# Patient Record
Sex: FEMALE | Race: BLACK OR AFRICAN AMERICAN | NOT HISPANIC OR LATINO | Employment: OTHER | URBAN - METROPOLITAN AREA
[De-identification: names, ages, dates, MRNs, and addresses within clinical notes are randomized per-mention and may not be internally consistent; named-entity substitution may affect disease eponyms.]

---

## 2017-01-07 ENCOUNTER — TELEPHONE (OUTPATIENT)
Dept: NURSING | Facility: CLINIC | Age: 27
End: 2017-01-07

## 2017-01-07 NOTE — TELEPHONE ENCOUNTER
"Call Type: Triage Call    Presenting Problem: I had a miscarriage in  October and I think I got  pregnant again the end of November; i have only had one period since  the miscarriage and that was 2 weeks after.  I had a positive  at  home pregnancy test but have not been seen yet.  One hour ago I  started cramping and having  bright red bleeding ; more than  spotting, I soaked my underwear.\"  Advisd to proceed to ED.  Triage Note:  Guideline Title: Pregnancy: Spontaneous Termination, Less Than 20 Weeks  Recommended Disposition: See ED Immediately  Original Inclination:  Override Disposition:  Intended Action:  Physician Contacted: No  Moderate vaginal bleeding ?  YES  Vaginal bleeding AND greater than 20 weeks gestation ? NO  Experiencing contractions AND 20-37 weeks gestation ? NO  Abdominal pain AND greater than 20 weeks gestation ? NO  New or worsening signs and symptoms that may indicate shock ? NO  More than 37 weeks gestation AND contractions ? NO  Recent positive pregnancy test or menses late AND new onset of pain on top or back  of shoulders ? NO  Unbearable abdominal, pelvic or back pain ? NO  Heavy vaginal bleeding (soaking 1 pad every hour for 2 hours or more) ? NO  Continuous bright red vaginal bleeding for more than 15 minutes (more than  spotting) ? NO  Persistent dizziness OR lightheadedness that does not resolve within ten minutes ?  NO  Physician Instructions:  Care Advice: Another adult should drive.  Do not give the patient anything to eat or drink.  Bring any tissue that has passed for examination by provider.  Call  if signs and symptoms of shock develop (such as unable to  stand due to faintness, dizziness, or lightheadedness  new onset of confusion  slow to respond or difficult to awaken  skin is pale, gray, cool, or moist to touch  severe weakness  loss of consciousness).  CALL  if either of these occur: increased bright red vaginal  bleeding or continuous (without relaxation) " abdominal pain.  IMMEDIATE ACTION  Write down provider's name. List or place the following in a bag for  transport with the patient: current prescription and/or nonprescription  medications  alternative treatments, therapies and medications  and street drugs.  Refrain from douching, using feminine hygiene sprays, scented deodorant  tampons, or nonprescription intravaginal medication until evaluated by a  provider.  Tell provider if known to be Rh-negative and have not received Rh o(D)  immune globulin. Rh o(D) immune globulin must be administered within 72  hours of pregnancy termination.

## 2017-01-11 ENCOUNTER — PRENATAL OFFICE VISIT (OUTPATIENT)
Dept: OBGYN | Facility: CLINIC | Age: 27
End: 2017-01-11
Payer: COMMERCIAL

## 2017-01-11 VITALS
RESPIRATION RATE: 16 BRPM | HEART RATE: 104 BPM | DIASTOLIC BLOOD PRESSURE: 93 MMHG | WEIGHT: 131 LBS | SYSTOLIC BLOOD PRESSURE: 150 MMHG | TEMPERATURE: 97.2 F | BODY MASS INDEX: 21.8 KG/M2

## 2017-01-11 DIAGNOSIS — O09.891 SUPERVISION OF OTHER HIGH RISK PREGNANCIES, FIRST TRIMESTER: Primary | ICD-10-CM

## 2017-01-11 DIAGNOSIS — O21.9 NAUSEA/VOMITING IN PREGNANCY: ICD-10-CM

## 2017-01-11 LAB
ANION GAP SERPL CALCULATED.3IONS-SCNC: 10 MMOL/L (ref 3–14)
BUN SERPL-MCNC: 13 MG/DL (ref 7–30)
CALCIUM SERPL-MCNC: 9.3 MG/DL (ref 8.5–10.1)
CHLORIDE SERPL-SCNC: 102 MMOL/L (ref 94–109)
CO2 SERPL-SCNC: 24 MMOL/L (ref 20–32)
CREAT SERPL-MCNC: 0.69 MG/DL (ref 0.52–1.04)
ERYTHROCYTE [DISTWIDTH] IN BLOOD BY AUTOMATED COUNT: 11.7 % (ref 10–15)
GFR SERPL CREATININE-BSD FRML MDRD: ABNORMAL ML/MIN/1.7M2
GLUCOSE SERPL-MCNC: 83 MG/DL (ref 70–99)
HCT VFR BLD AUTO: 36.4 % (ref 35–47)
HGB BLD-MCNC: 12.8 G/DL (ref 11.7–15.7)
MCH RBC QN AUTO: 31 PG (ref 26.5–33)
MCHC RBC AUTO-ENTMCNC: 35.2 G/DL (ref 31.5–36.5)
MCV RBC AUTO: 88 FL (ref 78–100)
PLATELET # BLD AUTO: 249 10E9/L (ref 150–450)
POTASSIUM SERPL-SCNC: 3.3 MMOL/L (ref 3.4–5.3)
RBC # BLD AUTO: 4.13 10E12/L (ref 3.8–5.2)
SODIUM SERPL-SCNC: 136 MMOL/L (ref 133–144)
WBC # BLD AUTO: 12.9 10E9/L (ref 4–11)

## 2017-01-11 PROCEDURE — 86762 RUBELLA ANTIBODY: CPT | Performed by: OBSTETRICS & GYNECOLOGY

## 2017-01-11 PROCEDURE — 36415 COLL VENOUS BLD VENIPUNCTURE: CPT | Performed by: OBSTETRICS & GYNECOLOGY

## 2017-01-11 PROCEDURE — 87389 HIV-1 AG W/HIV-1&-2 AB AG IA: CPT | Performed by: OBSTETRICS & GYNECOLOGY

## 2017-01-11 PROCEDURE — 99207 ZZC FIRST OB VISIT: CPT | Performed by: OBSTETRICS & GYNECOLOGY

## 2017-01-11 PROCEDURE — 87086 URINE CULTURE/COLONY COUNT: CPT | Performed by: OBSTETRICS & GYNECOLOGY

## 2017-01-11 PROCEDURE — 86900 BLOOD TYPING SEROLOGIC ABO: CPT | Performed by: OBSTETRICS & GYNECOLOGY

## 2017-01-11 PROCEDURE — 86780 TREPONEMA PALLIDUM: CPT | Performed by: OBSTETRICS & GYNECOLOGY

## 2017-01-11 PROCEDURE — 86901 BLOOD TYPING SEROLOGIC RH(D): CPT | Performed by: OBSTETRICS & GYNECOLOGY

## 2017-01-11 PROCEDURE — 80048 BASIC METABOLIC PNL TOTAL CA: CPT | Performed by: OBSTETRICS & GYNECOLOGY

## 2017-01-11 PROCEDURE — 87340 HEPATITIS B SURFACE AG IA: CPT | Performed by: OBSTETRICS & GYNECOLOGY

## 2017-01-11 PROCEDURE — 86850 RBC ANTIBODY SCREEN: CPT | Performed by: OBSTETRICS & GYNECOLOGY

## 2017-01-11 PROCEDURE — 85027 COMPLETE CBC AUTOMATED: CPT | Performed by: OBSTETRICS & GYNECOLOGY

## 2017-01-11 RX ORDER — ONDANSETRON 4 MG/1
4-8 TABLET, ORALLY DISINTEGRATING ORAL
Qty: 30 TABLET | Refills: 1 | Status: SHIPPED | OUTPATIENT
Start: 2017-01-11 | End: 2017-01-30

## 2017-01-11 RX ORDER — ONDANSETRON 4 MG/1
8 TABLET, ORALLY DISINTEGRATING ORAL
COMMUNITY
End: 2017-01-11

## 2017-01-11 NOTE — NURSING NOTE
"Chief Complaint   Patient presents with     Prenatal Care       Initial /93 mmHg  Pulse 104  Temp(Src) 97.2  F (36.2  C) (Oral)  Resp 16  Wt 131 lb (59.421 kg)  LMP 11/19/2016  Breastfeeding? No Estimated body mass index is 21.8 kg/(m^2) as calculated from the following:    Height as of 10/14/16: 5' 5\" (1.651 m).    Weight as of this encounter: 131 lb (59.421 kg).  BP completed using cuff size: regular Left arm  Omni Rosado, CMA      "

## 2017-01-11 NOTE — Clinical Note
2017    RE: Shwetha Issa,  1990    To Whom It May Concern:    Shwetha Issa is under our care and was seen at our office on 2017.    Due to her medical condition and complications in her pregnancy she should remain off work indefinitely, effective 2017. Her due date is 2017.     I hope this information is sufficient for your needs.  If you have any additional questions regarding this matter please contact our office.  Thank you.      Sincerely,        Barron Redding MD

## 2017-01-11 NOTE — MR AVS SNAPSHOT
After Visit Summary   1/11/2017    Shwetha Issa    MRN: [id number]           Patient Information     Date Of Birth          1990        Visit Information        Provider Department      1/11/2017 10:30 AM Barron Redding MD [facility]        Care Instructions                                                        If you have any questions regarding your visit, Please contact your care team.     [place] [facility]: [phone]    [facility] CLINIC HOURS TELEPHONE NUMBER   Barron Redding M.D.      Myriam-    Tricia Sinha-RANDOLPH Chen-Medical Assistant   Monday-[place]  8:00a.m-4:45 p.m  Wednesday-[place] 8:00a.m-4:45 p.m.  Thursday-[place]  8:00a.m-4:45 p.m.  Friday-[place]  8:00a.m-4:45 p.m. [facility]  [address] [place] [address] [place] [address]  [phone] ask [facility]  [phone] Fax  Imaging [phone]    [facility] Labor and Delivery  [address] [place] [address] [address].  [place], [place] [address] [phone]    [facility]  [address] [place] [address] [phone] [facility]  [phone] Fax  Imaging [phone]     Urgent Care locations:    [facility] Monday-Friday  5 pm - 9 pm  Saturday and Sunday   9 am - 5 pm    Monday-Friday   11 am - 9 pm  Saturday and Sunday   9 am - 5 pm   [phone]       If you need a medication refill, please contact your pharmacy. Please allow 3 business days for your refill to be completed.  As always, Thank you for trusting us with your healthcare needs!          Follow-ups after your visit        Who to contact     If you have questions or need follow up information about today's clinic visit or your schedule please contact [facility] directly at [phone].  Normal or non-critical lab and imaging results will be communicated to you by MyChart,  letter or phone within 4 business days after the clinic has received the results. If you do not hear from us within 7 days, please contact the clinic through [employer] or phone. If you have a critical or abnormal lab result, we will notify you by phone as soon as possible.  Submit refill requests through [employer] or call your pharmacy and they will forward the refill request to us. Please allow 3 business days for your refill to be completed.          Additional Information About Your Visit        [employer] Information     [employer] gives you secure access to your electronic health record. If you see a primary care provider, you can also send messages to your care team and make appointments. If you have questions, please call your primary care clinic.  If you do not have a primary care provider, please call [phone] and they will assist you.        Care EveryWhere ID     This is your Care EveryWhere ID. This could be used by other organizations to access your [facility] medical records  [id number]        Your Vitals Were     Pulse Temperature Respirations Last Period Breastfeeding?       104 97.2  F (36.2  C) (Oral) 16 11/19/2016 No        Blood Pressure from Last 3 Encounters:   01/11/17 150/93   10/14/16 138/91   09/02/16 122/80    Weight from Last 3 Encounters:   01/11/17 131 lb (59.421 kg)   10/14/16 132 lb 3.2 oz (59.966 kg)   09/02/16 127 lb (57.607 kg)              Today, you had the following     No orders found for display       Primary Care Provider Office Phone # Fax #    Blanche Marcelino PA-C [phone]       [facility] [address] [place]  [facility] [address]        Thank you!     Thank you for choosing [facility]  for your care. Our goal is always to provide you with excellent care. Hearing back from our patients is one way we can continue to improve our services. Please take a few minutes to complete the written survey that you may receive in the mail  after your visit with us. Thank you!             Your Updated Medication List - Protect others around you: Learn how to safely use, store and throw away your medicines at www.disposemymeds.org.          This list is accurate as of: 1/11/17 10:46 AM.  Always use your most recent med list.                   Brand Name Dispense Instructions for use    ondansetron 4 MG ODT tab    ZOFRAN-ODT     Take 8 mg by mouth       prenatal multivitamin  plus iron 27-0.8 MG Tabs per tablet      Take 1 tablet by mouth daily

## 2017-01-11 NOTE — PATIENT INSTRUCTIONS
If you have any questions regarding your visit, Please contact your care team.     [email or web address] [facility]: [phone]    [facility] CLINIC HOURS TELEPHONE NUMBER   PER Villalobos-    Tricia Sinha-RANDOLPH Chen-Medical Assistant   Monday-[place]  8:00a.m-4:45 p.m  Wednesday-[place] 8:00a.m-4:45 p.m.  Thursday-[place]  8:00a.m-4:45 p.m.  Friday-[place]  8:00a.m-4:45 p.m. [facility]  [address]  [place], [place] [address] [phone] ask [facility]  [phone] Fax  Imaging [phone]    [facility] Labor and Delivery  [address] [place] [address] Dr.  [place], [place] [address] [phone]    [facility]  [address] [place] [address]  [phone] ask [facility]  [phone] Fax  Imaging [phone]     Urgent Care locations:    [place]        [place] Monday-Friday  5 pm - 9 pm  Saturday and Sunday   9 am - 5 pm    Monday-Friday   11 am - 9 pm  Saturday and Sunday   9 am - 5 pm   [phone]       If you need a medication refill, please contact your pharmacy. Please allow 3 business days for your refill to be completed.  As always, Thank you for trusting us with your healthcare needs!

## 2017-01-12 PROBLEM — Z34.80 SUPERVISION OF OTHER NORMAL PREGNANCY, ANTEPARTUM: Status: ACTIVE | Noted: 2017-01-12

## 2017-01-12 PROBLEM — O21.9 NAUSEA/VOMITING IN PREGNANCY: Status: ACTIVE | Noted: 2017-01-12

## 2017-01-12 PROBLEM — O30.049 DICHORIONIC DIAMNIOTIC TWIN GESTATION: Status: ACTIVE | Noted: 2017-01-12

## 2017-01-12 PROBLEM — O20.8 SUBCHORIONIC HEMORRHAGE IN FIRST TRIMESTER: Status: ACTIVE | Noted: 2017-01-12

## 2017-01-12 PROBLEM — O20.0 THREATENED SPONTANEOUS ABORTION: Status: ACTIVE | Noted: 2017-01-12

## 2017-01-12 LAB
ABO + RH BLD: NORMAL
ABO + RH BLD: NORMAL
BLD GP AB SCN SERPL QL: NORMAL
BLOOD BANK CMNT PATIENT-IMP: NORMAL
HBV SURFACE AG SERPL QL IA: NONREACTIVE
HIV 1+2 AB+HIV1 P24 AG SERPL QL IA: NORMAL
RUBV IGG SERPL IA-ACNC: 94 IU/ML
SPECIMEN EXP DATE BLD: NORMAL
T PALLIDUM IGG+IGM SER QL: NEGATIVE

## 2017-01-12 NOTE — PROGRESS NOTES
Shwetha Issa is a 26 year old year old G 2 P 0 who presents for an initial obstetrical visit.  Referred by [facility] and self.    Currently experiencing normal pregnancy symptoms without particular problems including pain, bleeding, marked vomiting or weight loss except: mod N/V and ptyalism and 5# wt loss- better with Zofran and Rx refilled, vag bld and [facility] visit x 2 on 1/7 and 1/10- reviewed.   U/S showed living di/di twin pregnancy and 4.5 cm SHIV.  This was a planned pregnancy but not expected so soon after recent SAB 3 mo ago.   Here today alone.   Additional concerns: work restrictions- off for now and see Letter, dates, history of SAB x 1.     ROS:     Systems reviewed include constitutional; breast;                 cardiac; respiratory; gastrointestinal; genitourinary;                                musculoskeletal; integumentary; psychological;                                hematologic/lymphatic and endocrine.                  These systems were negative for significant symptoms except                 for the following: none and see above HPI.    Past medical, obstetrical, surgical, family and social history reviewed and as noted or updated in chart.     Allergies, meds and supplements are as noted or updated in chart.                    Episode OB dating, demographic and history forms completed or reviewed.    EXAM:  VS as noted. BMI- Body mass index is 21.8 kg/(m^2).    Relatively recent normal general exam- not repeated now.       ASSESSMENT: (O09.891) Supervision of other high risk pregnancies, first trimester  (primary encounter diagnosis)  Comment:   Plan: CBC WITH PLATELETS, HIV Antigen Antibody Combo,        Rubella Antibody IgG Quantitative, Hepatitis B         surface antigen, Anti Treponema, ABO/RH TYPE         AND SCREEN, Urine Culture Aerobic Bacterial,         Basic metabolic panel            (O21.9) Nausea/vomiting in pregnancy  Comment:   Plan: ondansetron (ZOFRAN ODT) 4 MG ODT tab, CBC  WITH        PLATELETS, HIV Antigen Antibody Combo, Rubella         Antibody IgG Quantitative, Hepatitis B surface         antigen, Anti Treponema, ABO/RH TYPE AND         SCREEN, Urine Culture Aerobic Bacterial, Basic         metabolic panel               PLAN:  Anticipatory guidance as noted. Symptoms, problems and concerns reviewed. Recommended weight gain discussed. Problem list initiated. Continue pelvic rest. Orders as noted. Pap due in 1.5 yrs. RTC in 4 weeks. Discuss special screening tests next.    Barron Redding MD

## 2017-01-13 ENCOUNTER — TELEPHONE (OUTPATIENT)
Dept: OBGYN | Facility: CLINIC | Age: 27
End: 2017-01-13

## 2017-01-13 DIAGNOSIS — O20.0 THREATENED SPONTANEOUS ABORTION: ICD-10-CM

## 2017-01-13 DIAGNOSIS — O30.041 DICHORIONIC DIAMNIOTIC TWIN PREGNANCY IN FIRST TRIMESTER: ICD-10-CM

## 2017-01-13 DIAGNOSIS — Z34.80 SUPERVISION OF OTHER NORMAL PREGNANCY, ANTEPARTUM: Primary | ICD-10-CM

## 2017-01-13 DIAGNOSIS — O21.9 NAUSEA/VOMITING IN PREGNANCY: ICD-10-CM

## 2017-01-13 DIAGNOSIS — O20.8 SUBCHORIONIC HEMORRHAGE IN FIRST TRIMESTER: ICD-10-CM

## 2017-01-13 LAB
BACTERIA SPEC CULT: NORMAL
MICRO REPORT STATUS: NORMAL
SPECIMEN SOURCE: NORMAL

## 2017-01-13 NOTE — TELEPHONE ENCOUNTER
.Reason for Call:    fyi -bleeding off-on, provided letter for employer    Detailed comments: employer is asking if she can work 4 hours a day vs 8 hours; bled on and off for 2 hours last night, and spotting this morning;  * 8 weeks along;     Phone Number Patient can be reached at: Home number on file [phone] (home)    Best Time: anytime    Can we leave a detailed message on this number? YES    Call taken on 1/13/2017 at 9:23 AM by Christel Rodriguez

## 2017-01-13 NOTE — TELEPHONE ENCOUNTER
I called patient.  She continues to have vaginal spotting and agrees to bed rest.  Patient will notify her place of employment that she is unable to do half days at this time.  She will explain to her work that until her next visit she is to be off completely.  Annette Tavares RN

## 2017-01-22 ENCOUNTER — TELEPHONE (OUTPATIENT)
Dept: NURSING | Facility: CLINIC | Age: 27
End: 2017-01-22

## 2017-01-22 DIAGNOSIS — O21.9 NAUSEA AND VOMITING IN PREGNANCY: Primary | ICD-10-CM

## 2017-01-22 RX ORDER — ONDANSETRON 4 MG/1
4 TABLET, FILM COATED ORAL EVERY 8 HOURS PRN
Qty: 30 TABLET | Refills: 1 | Status: SHIPPED | OUTPATIENT
Start: 2017-01-22 | End: 2017-02-08

## 2017-01-22 NOTE — TELEPHONE ENCOUNTER
Call Type: Triage Call    Presenting Problem: Dr Curt cardenas at 4:51 p.m.  Shwetha Issa,  1990, [id number]  9 weeks preg with twins. Subchorionic  hemorrhage dx'd, Bleeding less and darker color. Concern is she is  having some cramping now. [phone]  Juliet GRACE RN FNA  Triage Note:  Guideline Title: Pregnancy: Spontaneous Termination, Less Than 20 Weeks  ; Pregnancy: Spontaneous Termination, Less Than 20 Weeks  Recommended Disposition: See Provider within 4 hours  Original Inclination: Wanted to speak with a nurse  Override Disposition: Call Provider Immediately  Intended Action: Follow advice given  Physician Contacted: No  Continuous mild vaginal bleeding ?  YES  Passing tissue from the vagina ? NO  Moderate vaginal bleeding ? NO  Vaginal bleeding AND greater than 20 weeks gestation ? NO  Experiencing contractions AND 20-37 weeks gestation ? NO  Abdominal pain AND greater than 20 weeks gestation ? NO  New or worsening signs and symptoms that may indicate shock ? NO  More than 37 weeks gestation AND contractions ? NO  Prior ectopic pregnancy AND abdominal cramping or continuous mild vaginal bleeding  ? NO  IUD in place AND vaginal bleeding or abdominal cramping ? NO  Recent positive pregnancy test or menses late AND new onset of pain on top or back  of shoulders ? NO  Unbearable abdominal, pelvic or back pain ? NO  Lower abdominal, pelvic or back pain with any bleeding ? NO  Cerclage (cervix sutured closed) in place AND any vaginal bleeding or fluid ? NO  Heavy vaginal bleeding (soaking 1 pad every hour for 2 hours or more) ? NO  Continuous bright red vaginal bleeding for more than 15 minutes (more than  spotting) ? NO  Persistent dizziness OR lightheadedness that does not resolve within ten minutes ?  NO  Physician Instructions:  Care Advice: IMMEDIATE ACTION

## 2017-01-22 NOTE — TELEPHONE ENCOUNTER
Call Type: Triage Call    Presenting Problem: Having issues with nausea/ vomiting  due to  being  Pregnant with Twin - 9 weeks and BRE 8/26/17- Dx on 1/13/17  with Subchorionic hemorrhage in first trimester  and  Dr Barron Redding  following for OB . @ 220pm  Smart Web  Paged DR Dana Kaur  to  call Patient back at   [phone] to discuss.  Triage Note:  Guideline Title: No Guideline - Advice Per Reference (Adult)  Recommended Disposition: Call Provider Immediately  Original Inclination: Did not know what to do  Override Disposition:  Intended Action: Call PCP/HCP  Physician Contacted: No  CALL PROVIDER IMMEDIATELY ?  YES  SEE ED IMMEDIATELY ? NO  ACTIVATE  ? NO  Physician Instructions:  Care Advice:

## 2017-01-30 ENCOUNTER — TELEPHONE (OUTPATIENT)
Dept: OBGYN | Facility: CLINIC | Age: 27
End: 2017-01-30

## 2017-01-30 DIAGNOSIS — O21.9 NAUSEA/VOMITING IN PREGNANCY: Primary | ICD-10-CM

## 2017-01-30 RX ORDER — ONDANSETRON 4 MG/1
4-8 TABLET, ORALLY DISINTEGRATING ORAL
Qty: 30 TABLET | Refills: 1 | Status: SHIPPED | OUTPATIENT
Start: 2017-01-30 | End: 2017-02-08

## 2017-01-30 NOTE — TELEPHONE ENCOUNTER
Reason for Call:  Other prescription    Detailed comments: pt calling states that last time was given Zofran tablet to swallow and states that she vomits still with that kind. Pt states would like the Zofran that dissolves under the tongue as that one works better for pt. Pt states also has left side pain and wondering if that is normal or if she should worry about it. Please advise and contact pt in regards. Fills Zofran Prescription at [facility].    Phone Number Patient can be reached at: Home number on file [phone] (home)    Best Time: ANY    Can we leave a detailed message on this number? YES    Call taken on 1/30/2017 at 2:17 PM by Sharon Stanley

## 2017-01-30 NOTE — TELEPHONE ENCOUNTER
Please review for dissolving Zofran orders.  I pended the pharmacy. Patient called this afternoon reporting lower abdominal pain in early pregnancy.  Patient stated she has been having darkish brown vaginal discharge this entire pregnancy.  She is aware she has a subchorionic hemorrhage based on the previous ultrasound at the hospital.  Today she stated she has pain to her lower abdomen that takes her breath away.  The pain has been ongoing for the past 4 hours.  It will come and go and will send tingling and pain into her legs. She is 10 week pregnant with twins.  I advised patient be evaluated in the ER.  Patient agrees to go.  Annette Tavares RN

## 2017-02-08 ENCOUNTER — MEDICAL CORRESPONDENCE (OUTPATIENT)
Dept: HEALTH INFORMATION MANAGEMENT | Facility: CLINIC | Age: 27
End: 2017-02-08

## 2017-02-08 ENCOUNTER — PRENATAL OFFICE VISIT (OUTPATIENT)
Dept: OBGYN | Facility: CLINIC | Age: 27
End: 2017-02-08
Payer: COMMERCIAL

## 2017-02-08 ENCOUNTER — TELEPHONE (OUTPATIENT)
Dept: OBGYN | Facility: CLINIC | Age: 27
End: 2017-02-08

## 2017-02-08 VITALS
BODY MASS INDEX: 21.47 KG/M2 | HEART RATE: 93 BPM | RESPIRATION RATE: 16 BRPM | SYSTOLIC BLOOD PRESSURE: 131 MMHG | TEMPERATURE: 98.2 F | WEIGHT: 129 LBS | DIASTOLIC BLOOD PRESSURE: 80 MMHG

## 2017-02-08 DIAGNOSIS — O30.041 DICHORIONIC DIAMNIOTIC TWIN PREGNANCY IN FIRST TRIMESTER: ICD-10-CM

## 2017-02-08 DIAGNOSIS — Z34.80 SUPERVISION OF OTHER NORMAL PREGNANCY, ANTEPARTUM: Primary | ICD-10-CM

## 2017-02-08 DIAGNOSIS — O21.9 NAUSEA/VOMITING IN PREGNANCY: ICD-10-CM

## 2017-02-08 DIAGNOSIS — O20.8 SUBCHORIONIC HEMORRHAGE IN FIRST TRIMESTER: ICD-10-CM

## 2017-02-08 DIAGNOSIS — O20.0 THREATENED SPONTANEOUS ABORTION: ICD-10-CM

## 2017-02-08 PROCEDURE — 99207 ZZC PRENATAL VISIT: CPT | Performed by: OBSTETRICS & GYNECOLOGY

## 2017-02-08 RX ORDER — ONDANSETRON 4 MG/1
4-8 TABLET, ORALLY DISINTEGRATING ORAL
Qty: 60 TABLET | Refills: 2 | Status: SHIPPED | OUTPATIENT
Start: 2017-02-08 | End: 2017-05-03

## 2017-02-08 NOTE — NURSING NOTE
"Chief Complaint   Patient presents with     Prenatal Care     OBV 11w4d       Initial /80 mmHg  Pulse 93  Temp(Src) 98.2  F (36.8  C) (Oral)  Resp 16  Wt 129 lb (58.514 kg)  LMP 11/19/2016  Breastfeeding? No Estimated body mass index is 21.47 kg/(m^2) as calculated from the following:    Height as of 10/14/16: 5' 5\" (1.651 m).    Weight as of this encounter: 129 lb (58.514 kg).  Medication Reconciliation: complete   Claritzai BRENT Rosado      "

## 2017-02-08 NOTE — Clinical Note
Please arrange elective first trimester screen with MFM at  site if able. Di/di twins. I will sign referral later prn.

## 2017-02-08 NOTE — PATIENT INSTRUCTIONS
If you have any questions regarding your visit, Please contact your care team.     [email or web address] Access Services: [phone]    [facility] CLINIC HOURS TELEPHONE NUMBER   PER Villalobos-    Tricia Sinha-RANDOLPH Chen-Medical Assistant   Monday-[place]  8:00a.m-4:45 p.m  Wednesday-[place] 8:00a.m-4:45 p.m.  Thursday-[place]  8:00a.m-4:45 p.m.  Friday-[place]  8:00a.m-4:45 p.m. [facility]  [address]  [place], [place] [address] [phone] ask [facility]  [phone] Fax  Imaging [phone]    [facility] Labor and Delivery  [address] [place] [address] Dr.  [place], [place] [address] [phone]    [facility]  [address] [place] [address]  [phone] ask [facility]  [phone] Fax  Imaging [phone]     Urgent Care locations:    [place]        [place] Monday-Friday  5 pm - 9 pm  Saturday and Sunday   9 am - 5 pm    Monday-Friday   11 am - 9 pm  Saturday and Sunday   9 am - 5 pm   [phone]       If you need a medication refill, please contact your pharmacy. Please allow 3 business days for your refill to be completed.  As always, Thank you for trusting us with your healthcare needs!

## 2017-02-08 NOTE — PROGRESS NOTES
Doing ok. No signif signs, symptoms or concerns except had another [facility] ED visit for bld and was stable. Wt loss noted, nausea continues but vomiting controlled with Zofran ODT- Rx refilled. May add Vit B6 and Unisom. May RTW light duty and part time. Continue pelvic rest. Here with cousin.   Discussed special diagnostic and screening tests and plans (y = yes, n = no/declined): quad scr = n, survey u/s = n, Level 2 survey u/s with MFM = y, CF carrier scr = n, hemoglobinopathy or thalassemia scr= n, SMA, fragile X  and other genetic scr= n, 1st trimester scr with NT and later AFP or with cell free fetal DNA and later AFP = y, genetic amnio/CVS = n.  Advice as per Checklist update. Discussed condition, tests and care plan including RBA. Problem list updated.   Barron Redding MD

## 2017-02-08 NOTE — MR AVS SNAPSHOT
After Visit Summary   2017    Shwetha Issa    MRN: [id number]           Patient Information     Date Of Birth          1990        Visit Information        Provider Department      2017 12:15 PM Barron Redding MD [facility]        Today's Diagnoses     Supervision of other normal pregnancy, antepartum    -  1     Dichorionic diamniotic twin pregnancy in first trimester         Subchorionic hemorrhage in first trimester         Nausea/vomiting in pregnancy         Threatened spontaneous            Care Instructions                                                        If you have any questions regarding your visit, Please contact your care team.     [employer] [facility]: [phone]    [facility] CLINIC HOURS TELEPHONE NUMBER   Barron Redding M.D.      Myriam-    Tricia Sinha-RANDOLPH Chen-Medical Assistant   Monday-[place]  8:00a.m-4:45 p.m  Wednesday-[place] 8:00a.m-4:45 p.m.  Thursday-[place]  8:00a.m-4:45 p.m.  Friday-[place]  8:00a.m-4:45 p.m. [facility]  [address] [place] [address]  [phone] ask [facility]  [phone] Fax  Imaging [phone]    [facility] Labor and Delivery  [address] [place] [address] [place] [address]  [phone]    [facility]  [address] [facility] [place] [address]  [phone] [facility]  [phone] Fax  Imaging [phone]     Urgent Care locations:    [facility] Monday-Friday  5 pm - 9 pm  Saturday and    9 am - 5 pm    Monday-Friday   11 am - 9 pm  Saturday and    9 am - 5 pm   [phone]       If you need a medication refill, please contact your pharmacy. Please allow 3 business days for your refill to be completed.  As always, Thank you for trusting us with your healthcare needs!          Follow-ups after your visit         Who to contact     If you have questions or need follow up information about today's clinic visit or your schedule please contact [facility] directly at [phone].  Normal or non-critical lab and imaging results will be communicated to you by MyChart, letter or phone within 4 business days after the clinic has received the results. If you do not hear from us within 7 days, please contact the clinic through MyChart or phone. If you have a critical or abnormal lab result, we will notify you by phone as soon as possible.  Submit refill requests through VoltDB or call your pharmacy and they will forward the refill request to us. Please allow 3 business days for your refill to be completed.          Additional Information About Your Visit        [facility] Information     VoltDB gives you secure access to your electronic health record. If you see a primary care provider, you can also send messages to your care team and make appointments. If you have questions, please call your primary care clinic.  If you do not have a primary care provider, please call [phone] and they will assist you.        Care EveryWhere ID     This is your Care EveryWhere ID. This could be used by other organizations to access your [place] medical records  [id number]        Your Vitals Were     Pulse Temperature Respirations Last Period Breastfeeding?       93 98.2  F (36.8  C) (Oral) 16 11/19/2016 No        Blood Pressure from Last 3 Encounters:   02/08/17 131/80   01/11/17 150/93   10/14/16 138/91    Weight from Last 3 Encounters:   02/08/17 129 lb (58.514 kg)   01/11/17 131 lb (59.421 kg)   10/14/16 132 lb 3.2 oz (59.966 kg)              Today, you had the following     No orders found for display       Primary Care Provider Office Phone # Fax #    Blanhce Marcelino PA-C [phone]       [facility] [address]  [facility] [address]        Thank you!     Thank you for choosing  [facility]  for your care. Our goal is always to provide you with excellent care. Hearing back from our patients is one way we can continue to improve our services. Please take a few minutes to complete the written survey that you may receive in the mail after your visit with us. Thank you!             Your Updated Medication List - Protect others around you: Learn how to safely use, store and throw away your medicines at www.disposemymeds.org.          This list is accurate as of: 2/8/17 12:23 PM.  Always use your most recent med list.                   Brand Name Dispense Instructions for use    ondansetron 4 MG ODT tab    ZOFRAN ODT    30 tablet    Take 1-2 tablets (4-8 mg) by mouth 3 times daily (before meals)       ondansetron 4 MG tablet    ZOFRAN    30 tablet    Take 1 tablet (4 mg) by mouth every 8 hours as needed for nausea       prenatal multivitamin  plus iron 27-0.8 MG Tabs per tablet      Take 1 tablet by mouth daily

## 2017-02-14 ENCOUNTER — TRANSFERRED RECORDS (OUTPATIENT)
Dept: HEALTH INFORMATION MANAGEMENT | Facility: CLINIC | Age: 27
End: 2017-02-14

## 2017-02-15 ENCOUNTER — TELEPHONE (OUTPATIENT)
Dept: OBGYN | Facility: CLINIC | Age: 27
End: 2017-02-15

## 2017-02-15 NOTE — TELEPHONE ENCOUNTER
Patient was given letter on 1/11/17 by Dr. Redding to be off work indefinitely during this pregnancy.  Will forward to provider to advise.  Last seen by Dr. Redding for OBV on 2/8/17.

## 2017-02-15 NOTE — TELEPHONE ENCOUNTER
Reason for Call: Request for a LETTER    Order or referral being requested: Needs letter for her employer explaining limitations and restricitons when she returns to work. Put it to attention, To who it may concern. Patient will pick it up at clinic.    Date needed: as soon as possible    Has the patient been seen by the PCP for this problem? YES    Additional comments:     Phone number Patient can be reached at:  Home number on file [phone] (home)    Best Time:  any    Can we leave a detailed message on this number?  YES    Call taken on 2/15/2017 at 10:35 AM by Angela Stein

## 2017-02-16 ENCOUNTER — MYC MEDICAL ADVICE (OUTPATIENT)
Dept: OBGYN | Facility: CLINIC | Age: 27
End: 2017-02-16

## 2017-02-16 NOTE — LETTER
2017    RE: Shwetha Issa,  1990    To Whom It May Concern:    Shwetha Issa is under our care and was seen at our office on 2017.    Due to her medical condition and complications in her higher risk twin pregnancy she may return to work now but should limit her hours to 5 hours/day, 20 hours/week. She should avoid heavy lifting over 20 pounds, prolonged standing in one position for over an hour, excessive bending, straining, or repetitive reaching overhead. She should take all of her designated rest and meal breaks. Her due date is 2017.     I hope this information is sufficient for your needs.  If you have any additional questions regarding this matter please contact our office.  Thank you.      Sincerely,        Barron Redding MD

## 2017-02-16 NOTE — TELEPHONE ENCOUNTER
Pt was off work due to recurrent vaginal bleeding in first trimester with history of past SAB, nausea and vomiting, and twin pregnancy. At her last visit we discussed return to work light duty and part time if improving. Please print attached letter on letterhead and send.   Barron Redding MD

## 2017-03-06 ENCOUNTER — PRENATAL OFFICE VISIT (OUTPATIENT)
Dept: OBGYN | Facility: CLINIC | Age: 27
End: 2017-03-06
Payer: COMMERCIAL

## 2017-03-06 VITALS
WEIGHT: 137 LBS | DIASTOLIC BLOOD PRESSURE: 78 MMHG | HEART RATE: 117 BPM | TEMPERATURE: 97.8 F | SYSTOLIC BLOOD PRESSURE: 128 MMHG | BODY MASS INDEX: 22.8 KG/M2

## 2017-03-06 DIAGNOSIS — O30.042 DICHORIONIC DIAMNIOTIC TWIN PREGNANCY IN SECOND TRIMESTER: ICD-10-CM

## 2017-03-06 DIAGNOSIS — O20.8 SUBCHORIONIC HEMORRHAGE IN FIRST TRIMESTER: ICD-10-CM

## 2017-03-06 DIAGNOSIS — O21.9 NAUSEA/VOMITING IN PREGNANCY: ICD-10-CM

## 2017-03-06 DIAGNOSIS — Z34.80 SUPERVISION OF OTHER NORMAL PREGNANCY, ANTEPARTUM: ICD-10-CM

## 2017-03-06 DIAGNOSIS — O20.0 THREATENED SPONTANEOUS ABORTION: ICD-10-CM

## 2017-03-06 PROCEDURE — 99207 ZZC PRENATAL VISIT: CPT | Performed by: OBSTETRICS & GYNECOLOGY

## 2017-03-06 NOTE — MR AVS SNAPSHOT
After Visit Summary   3/6/2017    Shwetha Issa    MRN: [id number]           Patient Information     Date Of Birth          1990        Visit Information        Provider Department      3/6/2017 9:45 AM Barron Redding MD [facility]        Today's Diagnoses     Supervision of other normal pregnancy, antepartum        Dichorionic diamniotic twin pregnancy in second trimester        Subchorionic hemorrhage in first trimester        Nausea/vomiting in pregnancy        Threatened spontaneous           Care Instructions                                                        If you have any questions regarding your visit, Please contact your care team.     [employer] [facility]: [phone]    [facility] CLINIC HOURS TELEPHONE NUMBER   Barron Redding M.D.      Myriam-    Tricia Sinha-RANDOLPH Chen-Medical Assistant   Monday-[place]  8:00a.m-4:45 p.m  Wednesday-[place] 8:00a.m-4:45 p.m.  Thursday-[place]  8:00a.m-4:45 p.m.  Friday-[place]  8:00a.m-4:45 p.m. [facility]  [address] [place] [address]  [phone] ask [facility]  [phone] Fax  Imaging [phone]    [facility] Labor and Delivery  [address] [place] [address] [place] [address]  [phone]    [facility]  [address] [facility] [place] [address]  [phone] [facility]  [phone] Fax  Imaging [phone]     Urgent Care locations:    [facility] Monday-Friday  5 pm - 9 pm  Saturday and    9 am - 5 pm    Monday-Friday   11 am - 9 pm  Saturday and    9 am - 5 pm   [phone]       If you need a medication refill, please contact your pharmacy. Please allow 3 business days for your refill to be completed.  As always, Thank you for trusting us with your healthcare needs!          Follow-ups after your visit        Who to  contact     If you have questions or need follow up information about today's clinic visit or your schedule please contact [facility] directly at [phone].  Normal or non-critical lab and imaging results will be communicated to you by MyChart, letter or phone within 4 business days after the clinic has received the results. If you do not hear from us within 7 days, please contact the clinic through MOBEXOhart or phone. If you have a critical or abnormal lab result, we will notify you by phone as soon as possible.  Submit refill requests through YouStream Sport Highlights or call your pharmacy and they will forward the refill request to us. Please allow 3 business days for your refill to be completed.          Additional Information About Your Visit        MOBEXOharAFS Technologies Information     [employer] gives you secure access to your electronic health record. If you see a primary care provider, you can also send messages to your care team and make appointments. If you have questions, please call your primary care clinic.  If you do not have a primary care provider, please call [phone] and they will assist you.        Care EveryWhere ID     This is your Care EveryWhere ID. This could be used by other organizations to access your [place] medical records  [id number]        Your Vitals Were     Pulse Temperature Last Period Breastfeeding? BMI (Body Mass Index)       117 97.8  F (36.6  C) (Oral) 11/19/2016 No 22.8 kg/m2        Blood Pressure from Last 3 Encounters:   03/06/17 128/78   02/08/17 131/80   01/11/17 (!) 150/93    Weight from Last 3 Encounters:   03/06/17 62.1 kg (137 lb)   02/08/17 58.5 kg (129 lb)   01/11/17 59.4 kg (131 lb)              Today, you had the following     No orders found for display       Primary Care Provider Office Phone # Fax #    Blanche Marcelino PA-C [phone]       [facility] [address]  [facility] [address]        Thank you!     Thank you for choosing  [facility]  for your care. Our goal is always to provide you with excellent care. Hearing back from our patients is one way we can continue to improve our services. Please take a few minutes to complete the written survey that you may receive in the mail after your visit with us. Thank you!             Your Updated Medication List - Protect others around you: Learn how to safely use, store and throw away your medicines at www.disposemymeds.org.          This list is accurate as of: 3/6/17  9:53 AM.  Always use your most recent med list.                   Brand Name Dispense Instructions for use    ondansetron 4 MG ODT tab    ZOFRAN ODT    60 tablet    Take 1-2 tablets (4-8 mg) by mouth 3 times daily (before meals)       prenatal multivitamin  plus iron 27-0.8 MG Tabs per tablet      Take 1 tablet by mouth daily       UNISOM PO          VITAMIN B6 PO

## 2017-03-06 NOTE — Clinical Note
Please arrange Level 2 u/s with MFM at  site for di/di twins. They saw pt for 1st trim scr. I will sign referral later prn.

## 2017-03-06 NOTE — PATIENT INSTRUCTIONS
If you have any questions regarding your visit, Please contact your care team.     [employer] [facility]: [phone]    [facility] CLINIC HOURS TELEPHONE NUMBER   PER Villalobos-    Tricia Sinha-RANDOLPH Chen-Medical Assistant   Monday-[place]  8:00a.m-4:45 p.m  Wednesday-[place] 8:00a.m-4:45 p.m.  Thursday-[place]  8:00a.m-4:45 p.m.  Friday-[place]  8:00a.m-4:45 p.m. [facility]  [address]  [place], [place] [address] [phone] ask [facility]  [phone] Fax  Imaging [phone]    [facility] Labor and Delivery  [address] [place] [address] Dr.  [place], [place] [address] [phone]    [facility]  [address] [place] [address]  [phone] ask [facility]  [phone] Fax  Imaging [phone]     Urgent Care locations:    [place]        [place] Monday-Friday  5 pm - 9 pm  Saturday and Sunday   9 am - 5 pm    Monday-Friday   11 am - 9 pm  Saturday and Sunday   9 am - 5 pm   [phone]       If you need a medication refill, please contact your pharmacy. Please allow 3 business days for your refill to be completed.  As always, Thank you for trusting us with your healthcare needs!

## 2017-03-06 NOTE — NURSING NOTE
"Chief Complaint   Patient presents with     Prenatal Care     OBV 15w2d       Initial /78 (BP Location: Right arm, Patient Position: Chair, Cuff Size: Adult Regular)  Pulse 117  Temp 97.8  F (36.6  C) (Oral)  Wt 62.1 kg (137 lb)  LMP 11/19/2016  Breastfeeding? No  BMI 22.8 kg/m2 Estimated body mass index is 22.8 kg/(m^2) as calculated from the following:    Height as of 10/14/16: 1.651 m (5' 5\").    Weight as of this encounter: 62.1 kg (137 lb).  Medication Reconciliation: complete   Gustavo Rosado CMA      "

## 2017-03-07 ENCOUNTER — TELEPHONE (OUTPATIENT)
Dept: OBGYN | Facility: CLINIC | Age: 27
End: 2017-03-07

## 2017-03-07 ENCOUNTER — MEDICAL CORRESPONDENCE (OUTPATIENT)
Dept: HEALTH INFORMATION MANAGEMENT | Facility: CLINIC | Age: 27
End: 2017-03-07

## 2017-03-07 NOTE — PROGRESS NOTES
Doing well. No signif signs, symptoms or concerns except some N/V still but better and light old vag bld. Considering AFP for next. Arrange Level 2 u/s. Advice as per Checklist update. Discussed condition, tests and care plan including RBA. Problem list updated.   Barron Redding MD

## 2017-03-07 NOTE — TELEPHONE ENCOUNTER
Referral signed & faxed to MFM along with patient's prenatal chart.  MFM will contact patient to schedule within 3-5 business days.

## 2017-04-03 ENCOUNTER — PRENATAL OFFICE VISIT (OUTPATIENT)
Dept: OBGYN | Facility: CLINIC | Age: 27
End: 2017-04-03
Payer: COMMERCIAL

## 2017-04-03 VITALS
WEIGHT: 150 LBS | DIASTOLIC BLOOD PRESSURE: 71 MMHG | TEMPERATURE: 97.1 F | HEART RATE: 104 BPM | SYSTOLIC BLOOD PRESSURE: 119 MMHG | BODY MASS INDEX: 24.96 KG/M2

## 2017-04-03 DIAGNOSIS — O21.9 NAUSEA/VOMITING IN PREGNANCY: ICD-10-CM

## 2017-04-03 DIAGNOSIS — O20.8 SUBCHORIONIC HEMORRHAGE IN FIRST TRIMESTER: ICD-10-CM

## 2017-04-03 DIAGNOSIS — Z34.80 SUPERVISION OF OTHER NORMAL PREGNANCY, ANTEPARTUM: ICD-10-CM

## 2017-04-03 DIAGNOSIS — O20.0 THREATENED SPONTANEOUS ABORTION: ICD-10-CM

## 2017-04-03 DIAGNOSIS — O30.042 DICHORIONIC DIAMNIOTIC TWIN PREGNANCY IN SECOND TRIMESTER: ICD-10-CM

## 2017-04-03 PROCEDURE — 99207 ZZC PRENATAL VISIT: CPT | Performed by: OBSTETRICS & GYNECOLOGY

## 2017-04-03 NOTE — NURSING NOTE
"Chief Complaint   Patient presents with     Prenatal Care     OBV 19w2d       Initial /71 (BP Location: Right arm, Patient Position: Chair, Cuff Size: Adult Regular)  Pulse 104  Temp 97.1  F (36.2  C) (Oral)  Wt 68 kg (150 lb)  LMP 11/19/2016  Breastfeeding? No  BMI 24.96 kg/m2 Estimated body mass index is 24.96 kg/(m^2) as calculated from the following:    Height as of 10/14/16: 1.651 m (5' 5\").    Weight as of this encounter: 68 kg (150 lb).  Medication Reconciliation: complete   Gustavo Rosado CMA      "

## 2017-04-03 NOTE — LETTER
2017    RE: Shwetha Issa,  1990    To Whom It May Concern:    Shwetha Issa is under our care and was seen at our office on April 3, 2017.    Due to her medical condition and her higher risk twin pregnancy she may continue to work with limitations. Given her recent improvement she may increase her work hours to some extent. She should limit her hours to a maximum of 8 hours/day and 30 hours/week. She should avoid heavy lifting over 20 pounds, prolonged standing in one position for over an hour, excessive bending, straining, or repetitive reaching overhead. She should take all of her designated rest and meal breaks. Her due date is 2017.     I hope this information is sufficient for your needs.  If you have any additional questions regarding this matter please contact our office.  Thank you.      Sincerely,        Barron Redding MD

## 2017-04-03 NOTE — MR AVS SNAPSHOT
After Visit Summary   4/3/2017    Shwetha Issa    MRN: [id number]           Patient Information     Date Of Birth          1990        Visit Information        Provider Department      4/3/2017 10:15 AM Barron Redding MD [facility]        Today's Diagnoses     Supervision of other normal pregnancy, antepartum        Dichorionic diamniotic twin pregnancy in second trimester        Subchorionic hemorrhage in first trimester        Nausea/vomiting in pregnancy        Threatened spontaneous           Care Instructions                                                        If you have any questions regarding your visit, Please contact your care team.     [employer] [facility]: [phone]    [facility] CLINIC HOURS TELEPHONE NUMBER   Barron Redding M.D.      Myriam-    Tricia Sinha-RANDOLPH Chen-Medical Assistant   Monday-[place]  8:00a.m-4:45 p.m  Wednesday-[place] 8:00a.m-4:45 p.m.  Thursday-[place]  8:00a.m-4:45 p.m.  Friday-[place]  8:00a.m-4:45 p.m. [facility]  [address] [place] [address]  [phone] ask [facility]  [phone] Fax  Imaging [phone]    [facility] Labor and Delivery  [address] [place] [address] [place] [address]  [phone]    [facility]  [address] [place] [address]  [phone] [facility]  [phone] Fax  Imaging [phone]     Urgent Care locations:    [facility] Monday-Friday  5 pm - 9 pm  Saturday and    9 am - 5 pm    Monday-Friday   11 am - 9 pm  Saturday and    9 am - 5 pm   [phone]       If you need a medication refill, please contact your pharmacy. Please allow 3 business days for your refill to be completed.  As always, Thank you for trusting us with your healthcare needs!          Follow-ups after your visit        Who to  contact     If you have questions or need follow up information about today's clinic visit or your schedule please contact [facility] directly at [phone].  Normal or non-critical lab and imaging results will be communicated to you by MyChart, letter or phone within 4 business days after the clinic has received the results. If you do not hear from us within 7 days, please contact the clinic through MyChart or phone. If you have a critical or abnormal lab result, we will notify you by phone as soon as possible.  Submit refill requests through ClearViewâ„¢ Audio or call your pharmacy and they will forward the refill request to us. Please allow 3 business days for your refill to be completed.          Additional Information About Your Visit        CoupOption[employer]Nanjing Shouwangxing IT Information     ClearViewâ„¢ Audio gives you secure access to your electronic health record. If you see a primary care provider, you can also send messages to your care team and make appointments. If you have questions, please call your primary care clinic.  If you do not have a primary care provider, please call [phone] and they will assist you.        Care EveryWhere ID     This is your Care EveryWhere ID. This could be used by other organizations to access your [place] medical records  [id number]        Your Vitals Were     Pulse Temperature Last Period Breastfeeding? BMI (Body Mass Index)       104 97.1  F (36.2  C) (Oral) 11/19/2016 No 24.96 kg/m2        Blood Pressure from Last 3 Encounters:   04/03/17 119/71   03/06/17 128/78   02/08/17 131/80    Weight from Last 3 Encounters:   04/03/17 68 kg (150 lb)   03/06/17 62.1 kg (137 lb)   02/08/17 58.5 kg (129 lb)              Today, you had the following     No orders found for display       Primary Care Provider Office Phone # Fax #    Blanche Marcelino PA-C [phone]       [facility] [address]  [facility] [address]        Thank you!     Thank you for choosing [facility]  for your care. Our goal is always to provide you with excellent care. Hearing back from our patients is one way we can continue to improve our services. Please take a few minutes to complete the written survey that you may receive in the mail after your visit with us. Thank you!             Your Updated Medication List - Protect others around you: Learn how to safely use, store and throw away your medicines at www.disposemymeds.org.          This list is accurate as of: 4/3/17 10:25 AM.  Always use your most recent med list.                   Brand Name Dispense Instructions for use    ondansetron 4 MG ODT tab    ZOFRAN ODT    60 tablet    Take 1-2 tablets (4-8 mg) by mouth 3 times daily (before meals)       prenatal multivitamin  plus iron 27-0.8 MG Tabs per tablet      Take 1 tablet by mouth daily       UNISOM PO          VITAMIN B6 PO

## 2017-04-03 NOTE — Clinical Note
Please print out attached letter on letterhead paper and see if patient desires to pick it up or have it faxed to her work.

## 2017-04-03 NOTE — PATIENT INSTRUCTIONS
If you have any questions regarding your visit, Please contact your care team.     [facility]: [phone]    [facility] CLINIC HOURS TELEPHONE NUMBER   PER Villalobos-    Tricia Sinha-RANDOLPH Chen-Medical Assistant   Monday-[place]  8:00a.m-4:45 p.m  Wednesday-[place] 8:00a.m-4:45 p.m.  Thursday-[place]  8:00a.m-4:45 p.m.  Friday-[place]  8:00a.m-4:45 p.m. [facility]  [address]  [place], [place] [address] [phone] ask [facility]  [phone] Fax  Imaging [phone]    [facility] Labor and Delivery  [address] [place] [address] Dr.  [place], [place] [address] [phone]    [facility]  [address] [place] [address]  [phone] ask [facility]  [phone] Fax  Imaging [phone]     Urgent Care locations:    [place]        [place] Monday-Friday  5 pm - 9 pm  Saturday and Sunday   9 am - 5 pm    Monday-Friday   11 am - 9 pm  Saturday and Sunday   9 am - 5 pm   [phone]       If you need a medication refill, please contact your pharmacy. Please allow 3 business days for your refill to be completed.  As always, Thank you for trusting us with your healthcare needs!

## 2017-04-05 ENCOUNTER — TELEPHONE (OUTPATIENT)
Dept: OBGYN | Facility: CLINIC | Age: 27
End: 2017-04-05

## 2017-04-05 NOTE — TELEPHONE ENCOUNTER
Barron Redding MD  [facility]                     Please print out attached letter on letterhead paper and see if patient desires to pick it up or have it faxed to her work.         Called patient to let her know letter is ready. She would like it faxed but does not have a fax number and will call back later with information.    Gustavo Rosado, [facility]

## 2017-04-05 NOTE — PROGRESS NOTES
Doing ok. No signif signs, symptoms or concerns except some N/V. Considering AFP and will call if desired. Ok to inc work hours to 30/wk now- will provide Letter.  Advice as per Checklist update. Discussed condition, tests and care plan including RBA. Problem list updated.   Barron Redding MD

## 2017-04-07 NOTE — TELEPHONE ENCOUNTER
Fax  Number for the letter is:  [phone] Attention   [employer]  benefits office.  Letter printed on letter head and faxed.  Annette Tavares RN

## 2017-04-26 ENCOUNTER — TELEPHONE (OUTPATIENT)
Dept: OBGYN | Facility: CLINIC | Age: 27
End: 2017-04-26

## 2017-04-26 DIAGNOSIS — O20.0 THREATENED SPONTANEOUS ABORTION: ICD-10-CM

## 2017-04-26 DIAGNOSIS — O20.8 SUBCHORIONIC HEMORRHAGE IN FIRST TRIMESTER: ICD-10-CM

## 2017-04-26 DIAGNOSIS — Z34.80 SUPERVISION OF OTHER NORMAL PREGNANCY, ANTEPARTUM: ICD-10-CM

## 2017-04-26 DIAGNOSIS — O21.9 NAUSEA/VOMITING IN PREGNANCY: ICD-10-CM

## 2017-04-26 DIAGNOSIS — O30.042 DICHORIONIC DIAMNIOTIC TWIN PREGNANCY IN SECOND TRIMESTER: ICD-10-CM

## 2017-04-26 NOTE — TELEPHONE ENCOUNTER
Reason for Call:  Other     Detailed comments: anything for heartburn over the counter suggestions    Phone Number Patient can be reached at: Cell number on file:    Telephone Information:   Mobile [phone]       Best Time: any    Can we leave a detailed message on this number? YES    Call taken on 4/26/2017 at 11:47 AM by Kaley Martinez

## 2017-04-28 NOTE — TELEPHONE ENCOUNTER
I called patient back.  She has had increased heartburn and is to the point where she needs to take something to help.  She stated the heartburn is worse after eating meals and at night.  She does sit up after eating but doesn't get much relief.  I advised eating small meals throughout the day.  I advised she avoid caffeine, chocolate, fat, alcohol, tea, coffee, soft drinks and citrus juices.  I advised she take over the counter Mylanta or Maalox to see if it will help.  She will discuss this with her provider at the appointment on 5/3/2017.  Annette Tavares RN

## 2017-05-03 ENCOUNTER — PRENATAL OFFICE VISIT (OUTPATIENT)
Dept: OBGYN | Facility: CLINIC | Age: 27
End: 2017-05-03
Payer: COMMERCIAL

## 2017-05-03 VITALS
BODY MASS INDEX: 26.13 KG/M2 | HEART RATE: 101 BPM | DIASTOLIC BLOOD PRESSURE: 80 MMHG | WEIGHT: 157 LBS | SYSTOLIC BLOOD PRESSURE: 127 MMHG

## 2017-05-03 DIAGNOSIS — O21.9 NAUSEA/VOMITING IN PREGNANCY: ICD-10-CM

## 2017-05-03 DIAGNOSIS — Z34.80 SUPERVISION OF OTHER NORMAL PREGNANCY, ANTEPARTUM: ICD-10-CM

## 2017-05-03 DIAGNOSIS — O20.0 THREATENED SPONTANEOUS ABORTION: ICD-10-CM

## 2017-05-03 DIAGNOSIS — O30.042 DICHORIONIC DIAMNIOTIC TWIN PREGNANCY IN SECOND TRIMESTER: ICD-10-CM

## 2017-05-03 DIAGNOSIS — O20.8 SUBCHORIONIC HEMORRHAGE IN FIRST TRIMESTER: ICD-10-CM

## 2017-05-03 PROCEDURE — 99207 ZZC PRENATAL VISIT: CPT | Performed by: OBSTETRICS & GYNECOLOGY

## 2017-05-03 RX ORDER — ONDANSETRON 4 MG/1
4-8 TABLET, ORALLY DISINTEGRATING ORAL EVERY 8 HOURS PRN
Qty: 60 TABLET | Refills: 1 | Status: SHIPPED | OUTPATIENT
Start: 2017-05-03 | End: 2017-06-05

## 2017-05-03 NOTE — PROGRESS NOTES
No signif signs, symptoms or concerns except N/V again and some GERD. Zofran ODT prescription given. Here with partner. Advice appropriate to gestational age reviewed including pertinent Checklist items. Discussed condition, tests and care plan including RBA. Problem list updated. 1h GTT next.  A/P:  Shwetha was seen today for prenatal care.    Diagnoses and all orders for this visit:    Supervision of other normal pregnancy, antepartum    Dichorionic diamniotic twin pregnancy in second trimester    Subchorionic hemorrhage in first trimester    Nausea/vomiting in pregnancy  -     ondansetron (ZOFRAN ODT) 4 MG ODT tab; Take 1-2 tablets (4-8 mg) by mouth every 8 hours as needed for nausea or vomiting    Threatened spontaneous         Barron Redding MD

## 2017-05-03 NOTE — NURSING NOTE
"Chief Complaint   Patient presents with     Prenatal Care     OBV 23w4d       Initial /80 (BP Location: Left arm, Patient Position: Chair, Cuff Size: Adult Regular)  Pulse 101  Wt 157 lb (71.2 kg)  LMP 11/19/2016  Breastfeeding? No  BMI 26.13 kg/m2 Estimated body mass index is 26.13 kg/(m^2) as calculated from the following:    Height as of 10/14/16: 5' 5\" (1.651 m).    Weight as of this encounter: 157 lb (71.2 kg).  Medication Reconciliation: complete   Gustavo Rosado CMA      "

## 2017-05-03 NOTE — MR AVS SNAPSHOT
After Visit Summary   5/3/2017    Shwetha Issa    MRN: [id number]           Patient Information     Date Of Birth          1990        Visit Information        Provider Department      5/3/2017 11:00 AM Barron Redding MD [facility]        Today's Diagnoses     Supervision of other normal pregnancy, antepartum        Dichorionic diamniotic twin pregnancy in second trimester        Subchorionic hemorrhage in first trimester        Nausea/vomiting in pregnancy        Threatened spontaneous           Care Instructions                                                        If you have any questions regarding your visit, Please contact your care team.     [employer] [facility]: [phone]    [facility] CLINIC HOURS TELEPHONE NUMBER   Barron Redding M.D.      Myriam-    Tricia Sinha-RANDOLPH Chen-Medical Assistant   Monday-[place]  8:00a.m-4:45 p.m  Wednesday-[place] 8:00a.m-4:45 p.m.  Thursday-[place]  8:00a.m-4:45 p.m.  Friday-[place]  8:00a.m-4:45 p.m. [facility]  [address] [place] [address]  [phone] ask [facility]  [phone] Fax  Imaging [phone]    [facility]  [address] [place] [address] [place] [address]  [phone]    [facility]  [address] [place] [address]  [phone] [facility]  [phone] Fax  Imaging [phone]     Urgent Care locations:    [facility] Monday-Friday  5 pm - 9 pm  Saturday and    9 am - 5 pm    Monday-Friday   11 am - 9 pm  Saturday and    9 am - 5 pm   [phone]       If you need a medication refill, please contact your pharmacy. Please allow 3 business days for your refill to be completed.  As always, Thank you for trusting us with your healthcare needs!          Follow-ups after your visit        Who  to contact     If you have questions or need follow up information about today's clinic visit or your schedule please contact [facility] directly at [phone].  Normal or non-critical lab and imaging results will be communicated to you by MyChart, letter or phone within 4 business days after the clinic has received the results. If you do not hear from us within 7 days, please contact the clinic through MyChart or phone. If you have a critical or abnormal lab result, we will notify you by phone as soon as possible.  Submit refill requests through [employer] or call your pharmacy and they will forward the refill request to us. Please allow 3 business days for your refill to be completed.          Additional Information About Your Visit        [employer] Information     [employer] gives you secure access to your electronic health record. If you see a primary care provider, you can also send messages to your care team and make appointments. If you have questions, please call your primary care clinic.  If you do not have a primary care provider, please call [phone] and they will assist you.        Care EveryWhere ID     This is your Care EveryWhere ID. This could be used by other organizations to access your [place] medical records  [id number]        Your Vitals Were     Pulse Last Period Breastfeeding? BMI (Body Mass Index)          101 11/19/2016 No 26.13 kg/m2         Blood Pressure from Last 3 Encounters:   05/03/17 127/80   04/03/17 119/71   03/06/17 128/78    Weight from Last 3 Encounters:   05/03/17 157 lb (71.2 kg)   04/03/17 150 lb (68 kg)   03/06/17 137 lb (62.1 kg)              Today, you had the following     No orders found for display       Primary Care Provider Office Phone # Fax #    Blanche Marcelino PA-C [phone]       [facility] [address]  [facility] [address]        Thank you!     Thank you for choosing [facility]  for  your care. Our goal is always to provide you with excellent care. Hearing back from our patients is one way we can continue to improve our services. Please take a few minutes to complete the written survey that you may receive in the mail after your visit with us. Thank you!             Your Updated Medication List - Protect others around you: Learn how to safely use, store and throw away your medicines at www.disposemymeds.org.          This list is accurate as of: 5/3/17 11:05 AM.  Always use your most recent med list.                   Brand Name Dispense Instructions for use    ondansetron 4 MG ODT tab    ZOFRAN ODT    60 tablet    Take 1-2 tablets (4-8 mg) by mouth 3 times daily (before meals)       prenatal multivitamin  plus iron 27-0.8 MG Tabs per tablet      Take 1 tablet by mouth daily       UNISOM PO          VITAMIN B6 PO

## 2017-05-03 NOTE — PATIENT INSTRUCTIONS
If you have any questions regarding your visit, Please contact your care team.     [employer] [facility]: [phone]    [facility] CLINIC HOURS TELEPHONE NUMBER   PER Villalobos-    Tricia Sinha-RANDOLPH Chen-Medical Assistant   Monday-[place]  8:00a.m-4:45 p.m  Wednesday-[place] 8:00a.m-4:45 p.m.  Thursday-[place]  8:00a.m-4:45 p.m.  Friday-[place]  8:00a.m-4:45 p.m. [facility]  [address]  [place], [place] [address] [phone] ask [facility]  [phone] Fax  Imaging [phone]    [facility] Labor and Delivery  [address] [place] [address] Dr.  [place], [place] [address] [phone]    [facility]  [address] [place] [address]  [phone] ask [facility]  [phone] Fax  Imaging [phone]     Urgent Care locations:    [place]        [place] Monday-Friday  5 pm - 9 pm  Saturday and Sunday   9 am - 5 pm    Monday-Friday   11 am - 9 pm  Saturday and Sunday   9 am - 5 pm   [phone]       If you need a medication refill, please contact your pharmacy. Please allow 3 business days for your refill to be completed.  As always, Thank you for trusting us with your healthcare needs!

## 2017-06-05 ENCOUNTER — TELEPHONE (OUTPATIENT)
Dept: OBGYN | Facility: CLINIC | Age: 27
End: 2017-06-05

## 2017-06-05 DIAGNOSIS — O21.9 NAUSEA/VOMITING IN PREGNANCY: ICD-10-CM

## 2017-06-05 DIAGNOSIS — Z34.80 SUPERVISION OF OTHER NORMAL PREGNANCY, ANTEPARTUM: ICD-10-CM

## 2017-06-05 DIAGNOSIS — O20.8 SUBCHORIONIC HEMORRHAGE IN FIRST TRIMESTER: ICD-10-CM

## 2017-06-05 DIAGNOSIS — O30.042 DICHORIONIC DIAMNIOTIC TWIN PREGNANCY IN SECOND TRIMESTER: ICD-10-CM

## 2017-06-05 DIAGNOSIS — O20.0 THREATENED SPONTANEOUS ABORTION: ICD-10-CM

## 2017-06-05 RX ORDER — ONDANSETRON 4 MG/1
4-8 TABLET, ORALLY DISINTEGRATING ORAL EVERY 8 HOURS PRN
Qty: 60 TABLET | Refills: 0 | Status: SHIPPED | OUTPATIENT
Start: 2017-06-05 | End: 2017-07-20

## 2017-06-05 NOTE — TELEPHONE ENCOUNTER
Reason for Call:  Medication or medication refill:    Do you use a [facility]?  Name of the pharmacy and phone number for the current request:  [facility] Nisha Awad - [phone]    Name of the medication requested:     *Saturday morning Pt dtook her last zofran with B6 vitamin and all day Sunday she cannot been taking any food down and has been vomitting at work.  *    Can we leave a detailed message on this number? YES    Phone number patient can be reached at: Home number on file [phone] (home)    Best Time: Anytime    Call taken on 6/5/2017 at 11:13 AM by Alexi Martin

## 2017-06-05 NOTE — TELEPHONE ENCOUNTER
Patient has her next ob visit tomorrow.  I sent in a refill on this medication per RN refill guidelines.  Placed in appointment notes to address her nausea/vomiting in pregnancy. Notified patient. I also confirmed the time and location of her appointment tomorrow 6/6/2017.  Annette Tavares RN

## 2017-06-06 ENCOUNTER — PRENATAL OFFICE VISIT (OUTPATIENT)
Dept: OBGYN | Facility: CLINIC | Age: 27
End: 2017-06-06
Payer: COMMERCIAL

## 2017-06-06 VITALS
TEMPERATURE: 98.2 F | BODY MASS INDEX: 27.29 KG/M2 | SYSTOLIC BLOOD PRESSURE: 122 MMHG | WEIGHT: 164 LBS | DIASTOLIC BLOOD PRESSURE: 75 MMHG | HEART RATE: 110 BPM

## 2017-06-06 DIAGNOSIS — O30.043 DICHORIONIC DIAMNIOTIC TWIN PREGNANCY IN THIRD TRIMESTER: ICD-10-CM

## 2017-06-06 DIAGNOSIS — O20.0 THREATENED SPONTANEOUS ABORTION: ICD-10-CM

## 2017-06-06 DIAGNOSIS — O20.8 SUBCHORIONIC HEMORRHAGE IN FIRST TRIMESTER: ICD-10-CM

## 2017-06-06 DIAGNOSIS — O21.9 NAUSEA/VOMITING IN PREGNANCY: ICD-10-CM

## 2017-06-06 DIAGNOSIS — Z34.80 SUPERVISION OF OTHER NORMAL PREGNANCY, ANTEPARTUM: ICD-10-CM

## 2017-06-06 PROBLEM — O99.019 ANEMIA AFFECTING PREGNANCY: Status: ACTIVE | Noted: 2017-06-06

## 2017-06-06 LAB
GLUCOSE 1H P 50 G GLC PO SERPL-MCNC: 107 MG/DL (ref 60–129)
HGB BLD-MCNC: 8.2 G/DL (ref 11.7–15.7)

## 2017-06-06 PROCEDURE — 99207 ZZC PRENATAL VISIT: CPT | Performed by: OBSTETRICS & GYNECOLOGY

## 2017-06-06 PROCEDURE — 36415 COLL VENOUS BLD VENIPUNCTURE: CPT | Performed by: OBSTETRICS & GYNECOLOGY

## 2017-06-06 PROCEDURE — 00000218 ZZHCL STATISTIC OBHBG - HEMOGLOBIN: Performed by: OBSTETRICS & GYNECOLOGY

## 2017-06-06 PROCEDURE — 82950 GLUCOSE TEST: CPT | Performed by: OBSTETRICS & GYNECOLOGY

## 2017-06-06 RX ORDER — PROMETHAZINE HYDROCHLORIDE 25 MG/1
25 TABLET ORAL EVERY 8 HOURS PRN
Qty: 20 TABLET | Refills: 1 | Status: SHIPPED | OUTPATIENT
Start: 2017-06-06 | End: 2017-06-21

## 2017-06-06 NOTE — MR AVS SNAPSHOT
After Visit Summary   2017    Shwetha Issa    MRN: [id number]           Patient Information     Date Of Birth          1990        Visit Information        Provider Department      2017 10:00 AM Barron Redding MD [facility]        Today's Diagnoses     Supervision of other normal pregnancy, antepartum        Dichorionic diamniotic twin pregnancy in third trimester        Subchorionic hemorrhage in first trimester        Nausea/vomiting in pregnancy        Threatened spontaneous           Care Instructions                                                        If you have any questions regarding your visit, Please contact your care team.     [facility] [employer] [facility]: [phone]    [facility] CLINIC HOURS TELEPHONE NUMBER   Barron Redding M.D.      Myriam-    Tricia Sinha-RANDOLPH Chen-Medical Assistant   Monday-[place]  8:00a.m-4:45 p.m  Wednesday-[place] 8:00a.m-4:45 p.m.  Thursday-[place]  8:00a.m-4:45 p.m.  Friday-[place]  8:00a.m-4:45 p.m. [facility]  [address] [place] [address]  [phone] ask [facility]  [phone] Fax  Imaging [phone]    [facility] Labor and Delivery  [address] [place] [address] [place] [address]  [phone]    [facility]  [address] [facility] [place] [address]  [phone] [facility]  [phone] Fax  Imaging [phone]     Urgent Care locations:    [facility] Monday-Friday  5 pm - 9 pm  Saturday and    9 am - 5 pm    Monday-Friday   11 am - 9 pm  Saturday and    9 am - 5 pm   [phone]       If you need a medication refill, please contact your pharmacy. Please allow 3 business days for your refill to be completed.  As always, Thank you for trusting us with your healthcare needs!            Follow-ups after your visit        Who  to contact     If you have questions or need follow up information about today's clinic visit or your schedule please contact [facility] directly at [phone].  Normal or non-critical lab and imaging results will be communicated to you by MyChart, letter or phone within 4 business days after the clinic has received the results. If you do not hear from us within 7 days, please contact the clinic through AnyCloudhart or phone. If you have a critical or abnormal lab result, we will notify you by phone as soon as possible.  Submit refill requests through [employer]ee[employer]ee or call your pharmacy and they will forward the refill request to us. Please allow 3 business days for your refill to be completed.          Additional Information About Your Visit        [employer] Information     [employer] gives you secure access to your electronic health record. If you see a primary care provider, you can also send messages to your care team and make appointments. If you have questions, please call your primary care clinic.  If you do not have a primary care provider, please call [phone] and they will assist you.        Care EveryWhere ID     This is your Care EveryWhere ID. This could be used by other organizations to access your [place] medical records  [id number]        Your Vitals Were     Pulse Temperature Last Period Breastfeeding? BMI (Body Mass Index)       110 98.2  F (36.8  C) (Oral) 11/19/2016 No 27.29 kg/m2        Blood Pressure from Last 3 Encounters:   06/06/17 122/75   05/03/17 127/80   04/03/17 119/71    Weight from Last 3 Encounters:   06/06/17 164 lb (74.4 kg)   05/03/17 157 lb (71.2 kg)   04/03/17 150 lb (68 kg)              Today, you had the following     No orders found for display       Primary Care Provider Office Phone # Fax #    Blanche Marcelino PA-C [phone]       [facility] [address]  [facility] [address]        Thank you!     Thank you for choosing  [facility]  for your care. Our goal is always to provide you with excellent care. Hearing back from our patients is one way we can continue to improve our services. Please take a few minutes to complete the written survey that you may receive in the mail after your visit with us. Thank you!             Your Updated Medication List - Protect others around you: Learn how to safely use, store and throw away your medicines at www.disposemymeds.org.          This list is accurate as of: 6/6/17 10:11 AM.  Always use your most recent med list.                   Brand Name Dispense Instructions for use    ondansetron 4 MG ODT tab    ZOFRAN ODT    60 tablet    Take 1-2 tablets (4-8 mg) by mouth every 8 hours as needed for nausea or vomiting       prenatal multivitamin  plus iron 27-0.8 MG Tabs per tablet      Take 1 tablet by mouth daily       UNISOM PO          VITAMIN B6 PO

## 2017-06-06 NOTE — PATIENT INSTRUCTIONS
If you have any questions regarding your visit, Please contact your care team.     [employer] [facility]: [phone]    [facility] CLINIC HOURS TELEPHONE NUMBER   PER Villalobos-    Tricia Sinha-RANDOLPH Chen-Medical Assistant   Monday-[place]  8:00a.m-4:45 p.m  Wednesday-[place] 8:00a.m-4:45 p.m.  Thursday-[place]  8:00a.m-4:45 p.m.  Friday-[place]  8:00a.m-4:45 p.m. [facility]  [address]  [place], [place] [address] [phone] ask [facility]  [phone] Fax  Imaging [phone]    [facility] Delivery  [address] [place] [address] Dr.  [place], [place] [address] [phone]    [facility]  [address] [place] [address]  [phone] ask [facility]  [phone] Fax  Imaging [phone]     Urgent Care locations:    [place]        [place] Monday-Friday  5 pm - 9 pm  Saturday and Sunday   9 am - 5 pm    Monday-Friday   11 am - 9 pm  Saturday and Sunday   9 am - 5 pm   [phone]       If you need a medication refill, please contact your pharmacy. Please allow 3 business days for your refill to be completed.  As always, Thank you for trusting us with your healthcare needs!

## 2017-06-06 NOTE — PROGRESS NOTES
No signif signs, symptoms or concerns except N/V and using Zofran carefully due to limitations on coverage. Unisom/B6 did not seem to help. May also try Phenergan po prescription. Here with partner. If Br for twin A persists will need  section. Anticipate decreased work hours . Advice appropriate to gestational age reviewed including pertinent Checklist items. Discussed condition, tests and care plan including RBA. Problem list updated.   A/P:  Shwetha was seen today for prenatal care.    Diagnoses and all orders for this visit:    Supervision of other normal pregnancy, antepartum  -     Glucose tolerance gest screen 1 hour  -     OB hemoglobin    Dichorionic diamniotic twin pregnancy in third trimester    Subchorionic hemorrhage in first trimester    Nausea/vomiting in pregnancy  -     promethazine (PHENERGAN) 25 MG tablet; Take 1 tablet (25 mg) by mouth every 8 hours as needed for nausea or vomiting    Threatened spontaneous         Barron Redding MD

## 2017-06-06 NOTE — NURSING NOTE
"Chief Complaint   Patient presents with     Prenatal Care     OBV 28w3d       Initial /75 (BP Location: Left arm, Patient Position: Chair, Cuff Size: Adult Regular)  Pulse 110  Temp 98.2  F (36.8  C) (Oral)  Wt 164 lb (74.4 kg)  LMP 11/19/2016  Breastfeeding? No  BMI 27.29 kg/m2 Estimated body mass index is 27.29 kg/(m^2) as calculated from the following:    Height as of 10/14/16: 5' 5\" (1.651 m).    Weight as of this encounter: 164 lb (74.4 kg).  Medication Reconciliation: complete   Gustavo Rosado CMA      "

## 2017-06-13 ENCOUNTER — TELEPHONE (OUTPATIENT)
Dept: INFUSION THERAPY | Facility: CLINIC | Age: 27
End: 2017-06-13

## 2017-06-13 NOTE — TELEPHONE ENCOUNTER
Patient calling for information regarding Venofer infusion. RN discussed with patient what to expect with the infusion: length of time, peripheral IV, potential for hypersensitivity reaction, and side effects. Patient expressed understanding of information provided and denies further questions at this time.  Shelbie Funk  RN, BSN, OCN

## 2017-06-14 ENCOUNTER — TELEPHONE (OUTPATIENT)
Dept: FAMILY MEDICINE | Facility: CLINIC | Age: 27
End: 2017-06-14

## 2017-06-14 DIAGNOSIS — Z34.80 SUPERVISION OF OTHER NORMAL PREGNANCY, ANTEPARTUM: ICD-10-CM

## 2017-06-14 DIAGNOSIS — O30.043 DICHORIONIC DIAMNIOTIC TWIN PREGNANCY IN THIRD TRIMESTER: ICD-10-CM

## 2017-06-14 DIAGNOSIS — O20.0 THREATENED SPONTANEOUS ABORTION: ICD-10-CM

## 2017-06-14 DIAGNOSIS — O20.8 SUBCHORIONIC HEMORRHAGE IN FIRST TRIMESTER: ICD-10-CM

## 2017-06-14 DIAGNOSIS — O99.019 ANEMIA AFFECTING PREGNANCY: ICD-10-CM

## 2017-06-14 DIAGNOSIS — O21.9 NAUSEA/VOMITING IN PREGNANCY: ICD-10-CM
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
## 2017-06-15 NOTE — TELEPHONE ENCOUNTER
Noted hospital visit information  from [employer].  Patient was discharged home yesterday.  I called her to see how she is doing today.  Patient was still in bed this morning.  Babies are moving.  She denied contractions or vaginal bleeding.  She agreed to closely monitor symptoms and call back if she has any concerns.  Next ob appointment is 6/21/2017.  Annette Tavares RN

## 2017-06-19 ENCOUNTER — INFUSION THERAPY VISIT (OUTPATIENT)
Dept: INFUSION THERAPY | Facility: CLINIC | Age: 27
End: 2017-06-19
Payer: COMMERCIAL

## 2017-06-19 VITALS
TEMPERATURE: 97.3 F | RESPIRATION RATE: 18 BRPM | HEART RATE: 70 BPM | WEIGHT: 167.4 LBS | SYSTOLIC BLOOD PRESSURE: 118 MMHG | OXYGEN SATURATION: 100 % | DIASTOLIC BLOOD PRESSURE: 72 MMHG | BODY MASS INDEX: 27.86 KG/M2

## 2017-06-19 DIAGNOSIS — O99.019 ANEMIA AFFECTING PREGNANCY: Primary | ICD-10-CM

## 2017-06-19 PROCEDURE — 96365 THER/PROPH/DIAG IV INF INIT: CPT | Performed by: INTERNAL MEDICINE

## 2017-06-19 PROCEDURE — 99207 ZZC NO CHARGE LOS: CPT

## 2017-06-19 PROCEDURE — 96366 THER/PROPH/DIAG IV INF ADDON: CPT | Performed by: INTERNAL MEDICINE

## 2017-06-19 RX ADMIN — Medication 250 ML: at 10:14

## 2017-06-19 NOTE — MR AVS SNAPSHOT
After Visit Summary   6/19/2017    Shwetha Issa    MRN: [id number]           Patient Information     Date Of Birth          1990        Visit Information        Provider Department      6/19/2017 9:30 AM [place] [facility]        Today's Diagnoses     Anemia affecting pregnancy    -  1       Follow-ups after your visit        Your next 10 appointments already scheduled     Jun 21, 2017  8:30 AM CDT   Level 2 with [place] [facility] ([facility])    [address] [place] [address] [phone]            Jun 21, 2017 10:45 AM CDT   ESTABLISHED PRENATAL with Barron Redding MD   [facility] ([facility])    [address] [facility] [address] [phone]              Who to contact     If you have questions or need follow up information about today's clinic visit or your schedule please contact [facility] directly at [phone].  Normal or non-critical lab and imaging results will be communicated to you by Emcorehart, letter or phone within 4 business days after the clinic has received the results. If you do not hear from us within 7 days, please contact the clinic through [employer] or phone. If you have a critical or abnormal lab result, we will notify you by phone as soon as possible.  Submit refill requests through [employer] or call your pharmacy and they will forward the refill request to us. Please allow 3 business days for your refill to be completed.          Additional Information About Your Visit        Emcorehart Information     [employer] gives you secure access to your electronic health record. If you see a primary care provider, you can also send messages to your care team and make appointments. If you have questions, please call your primary care clinic.  If you do not have a primary care provider, please call [phone] and they  will assist you.      [employer] is an electronic gateway that provides easy, online access to your medical records. With [employer], you can request a clinic appointment, read your test results, renew a prescription or communicate with your care team.     To access your existing account, please contact your [facility] or call [phone] for assistance.        Care EveryWhere ID     This is your Care EveryWhere ID. This could be used by other organizations to access your [place] medical records  [id number]        Your Vitals Were     Pulse Temperature Respirations Last Period Pulse Oximetry BMI (Body Mass Index)    70 97.3  F (36.3  C) (Oral) 18 11/19/2016 100% 27.86 kg/m2       Blood Pressure from Last 3 Encounters:   06/19/17 118/72   06/06/17 122/75   05/03/17 127/80    Weight from Last 3 Encounters:   06/19/17 75.9 kg (167 lb 6.4 oz)   06/06/17 74.4 kg (164 lb)   05/03/17 71.2 kg (157 lb)              Today, you had the following     No orders found for display       Primary Care Provider Office Phone # Fax #    Blanche Layla Marcelino PA-C [phone]       [facility] [address]  [place] [address]        Thank you!     Thank you for choosing [facility]  for your care. Our goal is always to provide you with excellent care. Hearing back from our patients is one way we can continue to improve our services. Please take a few minutes to complete the written survey that you may receive in the mail after your visit with us. Thank you!             Your Updated Medication List - Protect others around you: Learn how to safely use, store and throw away your medicines at www.disposemymeds.org.          This list is accurate as of: 6/19/17 12:27 PM.  Always use your most recent med list.                   Brand Name Dispense Instructions for use    ondansetron 4 MG ODT tab    ZOFRAN ODT    60 tablet    Take 1-2 tablets (4-8 mg) by mouth every 8  hours as needed for nausea or vomiting       prenatal multivitamin  plus iron 27-0.8 MG Tabs per tablet      Take 1 tablet by mouth daily       promethazine 25 MG tablet    PHENERGAN    20 tablet    Take 1 tablet (25 mg) by mouth every 8 hours as needed for nausea or vomiting

## 2017-06-19 NOTE — PROGRESS NOTES
Infusion Nursing Note:  Shwetha Issa presents today for Venofer 1/3.    Patient seen by provider today: No   present during visit today: Not Applicable.    Note: N/A.    Intravenous Access:  Peripheral IV placed.    Treatment Conditions:  Not Applicable.      Post Infusion Assessment:  Patient tolerated infusion without incident.  No evidence of extravasations.  Access discontinued per protocol.    Discharge Plan:   Patient will return 6/21/17 for next appointment.   Patient discharged in stable condition accompanied by: self and friend.  Departure Mode: Ambulatory.    Ginger Loya RN

## 2017-06-21 ENCOUNTER — INFUSION THERAPY VISIT (OUTPATIENT)
Dept: INFUSION THERAPY | Facility: CLINIC | Age: 27
End: 2017-06-21
Payer: COMMERCIAL

## 2017-06-21 ENCOUNTER — DOCUMENTATION ONLY (OUTPATIENT)
Dept: SPIRITUAL SERVICES | Facility: CLINIC | Age: 27
End: 2017-06-21

## 2017-06-21 ENCOUNTER — PRENATAL OFFICE VISIT (OUTPATIENT)
Dept: OBGYN | Facility: CLINIC | Age: 27
End: 2017-06-21
Payer: COMMERCIAL

## 2017-06-21 VITALS
BODY MASS INDEX: 27.62 KG/M2 | TEMPERATURE: 96.7 F | WEIGHT: 166 LBS | SYSTOLIC BLOOD PRESSURE: 102 MMHG | HEART RATE: 95 BPM | DIASTOLIC BLOOD PRESSURE: 73 MMHG

## 2017-06-21 VITALS
TEMPERATURE: 97.7 F | OXYGEN SATURATION: 98 % | HEART RATE: 67 BPM | SYSTOLIC BLOOD PRESSURE: 115 MMHG | DIASTOLIC BLOOD PRESSURE: 76 MMHG | RESPIRATION RATE: 18 BRPM

## 2017-06-21 DIAGNOSIS — O30.043 DICHORIONIC DIAMNIOTIC TWIN PREGNANCY IN THIRD TRIMESTER: ICD-10-CM

## 2017-06-21 DIAGNOSIS — Z34.80 SUPERVISION OF OTHER NORMAL PREGNANCY, ANTEPARTUM: ICD-10-CM

## 2017-06-21 DIAGNOSIS — Z71.81 SPIRITUAL OR RELIGIOUS COUNSELING: Primary | ICD-10-CM

## 2017-06-21 DIAGNOSIS — O99.019 ANEMIA AFFECTING PREGNANCY: Primary | ICD-10-CM

## 2017-06-21 DIAGNOSIS — O20.0 THREATENED SPONTANEOUS ABORTION: ICD-10-CM

## 2017-06-21 DIAGNOSIS — O20.8 SUBCHORIONIC HEMORRHAGE IN FIRST TRIMESTER: ICD-10-CM

## 2017-06-21 DIAGNOSIS — Z71.81 SPIRITUAL OR RELIGIOUS COUNSELING: ICD-10-CM

## 2017-06-21 DIAGNOSIS — O99.019 ANEMIA AFFECTING PREGNANCY: ICD-10-CM

## 2017-06-21 DIAGNOSIS — O21.9 NAUSEA/VOMITING IN PREGNANCY: ICD-10-CM

## 2017-06-21 PROCEDURE — 96365 THER/PROPH/DIAG IV INF INIT: CPT | Performed by: INTERNAL MEDICINE

## 2017-06-21 PROCEDURE — 96366 THER/PROPH/DIAG IV INF ADDON: CPT | Performed by: INTERNAL MEDICINE

## 2017-06-21 PROCEDURE — 99207 ZZC PRENATAL VISIT: CPT | Performed by: OBSTETRICS & GYNECOLOGY

## 2017-06-21 PROCEDURE — 99207 ZZC NO CHARGE LOS: CPT

## 2017-06-21 RX ADMIN — Medication 250 ML: at 09:06

## 2017-06-21 NOTE — MR AVS SNAPSHOT
After Visit Summary   2017    Shwetha Issa    MRN: [id number]           Patient Information     Date Of Birth          1990        Visit Information        Provider Department      2017 10:45 AM Barron Redding MD [facility]        Today's Diagnoses     Anemia affecting pregnancy        Supervision of other normal pregnancy, antepartum        Dichorionic diamniotic twin pregnancy in third trimester        Subchorionic hemorrhage in first trimester        Nausea/vomiting in pregnancy        Threatened spontaneous           Care Instructions                                                        If you have any questions regarding your visit, Please contact your care team.     [employer] [facility]: [phone]    [facility] CLINIC HOURS TELEPHONE NUMBER   Barron Redding M.D.      Myriam-    Tricia Sinha-RANDOLPH Chen-Medical Assistant   Monday-[place]  8:00a.m-4:45 p.m  Wednesday-[place] 8:00a.m-4:45 p.m.  Thursday-[place]  8:00a.m-4:45 p.m.  Friday-[place]  8:00a.m-4:45 p.m. [facility]  [address] [place]  [place] [address]  [phone] ask [facility]  [phone] Fax  Imaging [phone]    [facility] Labor and Delivery  [address] [place] [address] [place] [address]  [phone]    [facility]  [address] [facility] [place] [address]  [phone] [facility]  [phone] Fax  Imaging [phone]     Urgent Care locations:    [facility] Monday-Friday  5 pm - 9 pm  Saturday and    9 am - 5 pm    Monday-Friday   11 am - 9 pm  Saturday and    9 am - 5 pm   [phone]       If you need a medication refill, please contact your pharmacy. Please allow 3 business days for your refill to be completed.  As always, Thank you for trusting us with your healthcare needs!             Follow-ups after your visit        Your next 10 appointments already scheduled     Jun 26, 2017  9:30 AM CDT   Level 2 with [address] ([facility])    [address] [place] [address] [phone]              Who to contact     If you have questions or need follow up information about today's clinic visit or your schedule please contact [facility] directly at [phone].  Normal or non-critical lab and imaging results will be communicated to you by Pricing Assistanthart, letter or phone within 4 business days after the clinic has received the results. If you do not hear from us within 7 days, please contact the clinic through Next Healt[employer] or phone. If you have a critical or abnormal lab result, we will notify you by phone as soon as possible.  Submit refill requests through [employer] or call your pharmacy and they will forward the refill request to us. Please allow 3 business days for your refill to be completed.          Additional Information About Your Visit        Pricing Assistanthart Information     [employer] gives you secure access to your electronic health record. If you see a primary care provider, you can also send messages to your care team and make appointments. If you have questions, please call your primary care clinic.  If you do not have a primary care provider, please call [phone] and they will assist you.        Care EveryWhere ID     This is your Care EveryWhere ID. This could be used by other organizations to access your [place] medical records  [id number]        Your Vitals Were     Pulse Temperature Last Period Breastfeeding? BMI (Body Mass Index)       95 96.7  F (35.9  C) (Oral) 11/19/2016 No 27.62 kg/m2        Blood Pressure from Last 3 Encounters:   06/21/17 102/73   06/21/17 115/76   06/19/17 118/72    Weight from Last 3 Encounters:   06/21/17 166 lb (75.3 kg)   06/19/17 167 lb 6.4 oz (75.9 kg)   06/06/17 164 lb (74.4 kg)               Today, you had the following     No orders found for display         Today's Medication Changes          These changes are accurate as of: 6/21/17 10:59 AM.  If you have any questions, ask your nurse or doctor.               Stop taking these medicines if you haven't already. Please contact your care team if you have questions.     promethazine 25 MG tablet   Commonly known as:  PHENERGAN   Stopped by:  Barron Redding MD                    Primary Care Provider Office Phone # Fax #    Blanche Layla Marcelino PA-C [phone]       [facility] [address]  [facility] [address]        Equal Access to Services     [facility]: Hadii aad ku hadasho Soomaali, waaxda luqadaha, qaybta kaalmada adeegyada, waxay idiin hayaan randy givens . So [facility] [phone].    ATENCIÓN: Si habla español, tiene a rea disposición servicios gratuitos de asistencia lingüística. LlSelect Medical [facility] [phone].    We comply with applicable federal civil rights laws and [place] laws. We do not discriminate on the basis of race, color, national origin, age, disability sex, sexual orientation or gender identity.            Thank you!     Thank you for choosing [facility]  for your care. Our goal is always to provide you with excellent care. Hearing back from our patients is one way we can continue to improve our services. Please take a few minutes to complete the written survey that you may receive in the mail after your visit with us. Thank you!             Your Updated Medication List - Protect others around you: Learn how to safely use, store and throw away your medicines at www.disposemymeds.org.          This list is accurate as of: 6/21/17 10:59 AM.  Always use your most recent med list.                   Brand Name Dispense Instructions for use Diagnosis    ondansetron 4 MG ODT tab    ZOFRAN ODT    60 tablet    Take 1-2 tablets (4-8 mg) by mouth every 8 hours as needed for  nausea or vomiting    Nausea/vomiting in pregnancy       prenatal multivitamin  plus iron 27-0.8 MG Tabs per tablet      Take 1 tablet by mouth daily
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
## 2017-06-21 NOTE — LETTER
2017    RE: Shwetha Issa,  1990    To Whom It May Concern:     Shwetha Issa is under our care and was seen at our office on 2017.    Due to her medical condition and complications in her pregnancy she should reduce her work hours to 4 hours/day, 5 days/week. She should avoid heavy lifting over 15 pounds, prolonged standing in one position for over an hour, excessive bending, straining, or repetitive reaching overhead. She should take all of her designated rest and meal breaks. Recommend these restrictions as of 2017. Her due date is 2017.     I hope this information is sufficient for your needs.  If you have any additional questions regarding this matter please contact our office.  Thank you.      Sincerely,        Barron Redding MD

## 2017-06-21 NOTE — NURSING NOTE
"Chief Complaint   Patient presents with     Prenatal Care     OBV 30w4d       Initial /73 (BP Location: Left arm, Patient Position: Chair, Cuff Size: Adult Regular)  Pulse 95  Temp 96.7  F (35.9  C) (Oral)  Wt 166 lb (75.3 kg)  LMP 11/19/2016  Breastfeeding? No  BMI 27.62 kg/m2 Estimated body mass index is 27.62 kg/(m^2) as calculated from the following:    Height as of 10/14/16: 5' 5\" (1.651 m).    Weight as of this encounter: 166 lb (75.3 kg).  Medication Reconciliation: complete   Gustavo Rosado CMA      "

## 2017-06-21 NOTE — PROGRESS NOTES
Infusion Nursing Note:  Shwetha Issa presents today for Venofer 2/3.    Patient seen by provider today: No   present during visit today: Not Applicable.    Note: N/A.    Intravenous Access:  Peripheral IV placed.    Treatment Conditions:  Not Applicable.      Post Infusion Assessment:  Patient tolerated infusion without incident.  No evidence of extravasations.  Access discontinued per protocol.    Discharge Plan:   Patient will return 6/26/17 for next appointment.   Patient discharged in stable condition accompanied by: self and significant other.  Departure Mode: Ambulatory.    Ginger Loya RN

## 2017-06-21 NOTE — MR AVS SNAPSHOT
After Visit Summary   6/21/2017    Shwetha Issa    MRN: [id number]           Patient Information     Date Of Birth          1990        Visit Information        Provider Department      6/21/2017 8:30 AM [place] [facility]        Today's Diagnoses     Anemia affecting pregnancy    -  1    Spiritual or Scientologist counseling           Follow-ups after your visit        Your next 10 appointments already scheduled     Jun 21, 2017 10:45 AM CDT   ESTABLISHED PRENATAL with Barron Redding MD   [facility] ([facility])    [address] [facility] [id number] [phone]            Jun 26, 2017  9:30 AM CDT   Level 2 with [address] ([facility])    [address] [address] [place] [address] [phone]              Who to contact     If you have questions or need follow up information about today's clinic visit or your schedule please contact [facility] directly at [phone].  Normal or non-critical lab and imaging results will be communicated to you by MyChart, letter or phone within 4 business days after the clinic has received the results. If you do not hear from us within 7 days, please contact the clinic through Skybox Securityhart or phone. If you have a critical or abnormal lab result, we will notify you by phone as soon as possible.  Submit refill requests through [employer] or call your pharmacy and they will forward the refill request to us. Please allow 3 business days for your refill to be completed.          Additional Information About Your Visit        MyChart Information     [employer] gives you secure access to your electronic health record. If you see a primary care provider, you can also send messages to your care team and make appointments. If you have questions, please call your primary care clinic.  If you do not have a primary care  provider, please call [phone] and they will assist you.      [employer] is an electronic gateway that provides easy, online access to your medical records. With [employer], you can request a clinic appointment, read your test results, renew a prescription or communicate with your care team.     To access your existing account, please contact your [facility] or call [phone] for assistance.        Care EveryWhere ID     This is your Care EveryWhere ID. This could be used by other organizations to access your [place] medical records  [id number]        Your Vitals Were     Pulse Temperature Respirations Last Period Pulse Oximetry       67 97.7  F (36.5  C) 18 11/19/2016 98%        Blood Pressure from Last 3 Encounters:   06/21/17 115/76   06/19/17 118/72   06/06/17 122/75    Weight from Last 3 Encounters:   06/19/17 75.9 kg (167 lb 6.4 oz)   06/06/17 74.4 kg (164 lb)   05/03/17 71.2 kg (157 lb)              Today, you had the following     No orders found for display       Primary Care Provider Office Phone # Fax #    Blanche Layla Marcelino PA-C [phone]       [facility] [address]  [facility] [id number]        Equal Access to Services     MUSA REECE : Hadii aad ku hadasho Soomaali, waaxda luqadaha, qaybta kaalmada adeegyada, waxay idiin hayaan adeeg kharabertha givens . So [facility] [phone].    ATENCIÓN: Si habla español, tiene a rea disposición servicios gratuitos de asistencia lingüística. Llame al [phone].    We comply with applicable federal civil rights laws and [place] laws. We do not discriminate on the basis of race, color, national origin, age, disability sex, sexual orientation or gender identity.            Thank you!     Thank you for choosing [facility]  for your care. Our goal is always to provide you with excellent care. Hearing back from our patients is one way we can continue to improve our services. Please  take a few minutes to complete the written survey that you may receive in the mail after your visit with us. Thank you!             Your Updated Medication List - Protect others around you: Learn how to safely use, store and throw away your medicines at www.disposemymeds.org.          This list is accurate as of: 6/21/17 10:41 AM.  Always use your most recent med list.                   Brand Name Dispense Instructions for use Diagnosis    ondansetron 4 MG ODT tab    ZOFRAN ODT    60 tablet    Take 1-2 tablets (4-8 mg) by mouth every 8 hours as needed for nausea or vomiting    Nausea/vomiting in pregnancy       prenatal multivitamin  plus iron 27-0.8 MG Tabs per tablet      Take 1 tablet by mouth daily        promethazine 25 MG tablet    PHENERGAN    20 tablet    Take 1 tablet (25 mg) by mouth every 8 hours as needed for nausea or vomiting    Nausea/vomiting in pregnancy

## 2017-06-21 NOTE — PATIENT INSTRUCTIONS
If you have any questions regarding your visit, Please contact your care team.     [employer] Access Services: [phone]    [facility] CLINIC HOURS TELEPHONE NUMBER   PER Villalobos-    Tricia Sinha-RANDOLPH Chen-Medical Assistant   Monday-[place]  8:00a.m-4:45 p.m  Wednesday-[place] 8:00a.m-4:45 p.m.  Thursday-[place]  8:00a.m-4:45 p.m.  Friday-[place]  8:00a.m-4:45 p.m. [facility]  [address]  [place], [place] [address] [phone] ask [facility]  [phone] Fax  Imaging [phone]    [facility] Labor and Delivery  [address] [place] [address] Dr.  [place], [place] [address] [phone]    [facility]  [address] [place] [address]  [phone] ask [facility]  [phone] Fax  Imaging [phone]     Urgent Care locations:    [place]        [place] Monday-Friday  5 pm - 9 pm  Saturday and Sunday   9 am - 5 pm    Monday-Friday   11 am - 9 pm  Saturday and Sunday   9 am - 5 pm   [phone]       If you need a medication refill, please contact your pharmacy. Please allow 3 business days for your refill to be completed.  As always, Thank you for trusting us with your healthcare needs!

## 2017-06-21 NOTE — PROGRESS NOTES
SPIRITUAL HEALTH SERVICES  SPIRITUAL ASSESSMENT Progress Note  [facility]     introduced himself to Shwetha Maribabar & her Significant Other and informed them of his availability.    Lior Villalobos M.Div., [facility]  Staff   Office tel: [phone]

## 2017-06-26 ENCOUNTER — INFUSION THERAPY VISIT (OUTPATIENT)
Dept: INFUSION THERAPY | Facility: CLINIC | Age: 27
End: 2017-06-26
Payer: COMMERCIAL

## 2017-06-26 VITALS
RESPIRATION RATE: 18 BRPM | TEMPERATURE: 96.9 F | OXYGEN SATURATION: 100 % | HEART RATE: 98 BPM | SYSTOLIC BLOOD PRESSURE: 114 MMHG | DIASTOLIC BLOOD PRESSURE: 74 MMHG

## 2017-06-26 DIAGNOSIS — O99.013 ANEMIA AFFECTING PREGNANCY, THIRD TRIMESTER: Primary | ICD-10-CM

## 2017-06-26 PROCEDURE — 96365 THER/PROPH/DIAG IV INF INIT: CPT | Performed by: INTERNAL MEDICINE

## 2017-06-26 PROCEDURE — 96366 THER/PROPH/DIAG IV INF ADDON: CPT | Performed by: INTERNAL MEDICINE

## 2017-06-26 RX ADMIN — Medication 250 ML: at 10:05

## 2017-06-26 NOTE — PROGRESS NOTES
Infusion Nursing Note:  Shwetha Issa presents today for Venofer 3/3.    Patient seen by provider today: No   present during visit today: Not Applicable.    Note: N/A.    Intravenous Access:  Peripheral IV placed.    Treatment Conditions:  Not Applicable.      Post Infusion Assessment:  Patient tolerated infusion without incident.  Blood return noted pre and post infusion.  Site patent and intact, free from redness, edema or discomfort.  Access discontinued per protocol.    Discharge Plan:   Patient discharged in stable condition accompanied by: significant other.  Departure Mode: Ambulatory.    Sara Jacques RN

## 2017-06-26 NOTE — MR AVS SNAPSHOT
After Visit Summary   6/26/2017    Shwetha Issa    MRN: [id number]           Patient Information     Date Of Birth          1990        Visit Information        Provider Department      6/26/2017 9:30 AM [place] 8 [facility]        Today's Diagnoses     Anemia affecting pregnancy, third trimester    -  1       Follow-ups after your visit        Your next 10 appointments already scheduled     Jul 06, 2017 10:15 AM CDT   ESTABLISHED PRENATAL with Barron Redding MD   [facility] ([facility])    [address] [place] [address] [phone]              Who to contact     If you have questions or need follow up information about today's clinic visit or your schedule please contact [facility] directly at [phone].  Normal or non-critical lab and imaging results will be communicated to you by PAIEONhart, letter or phone within 4 business days after the clinic has received the results. If you do not hear from us within 7 days, please contact the clinic through PAIEONhart or phone. If you have a critical or abnormal lab result, we will notify you by phone as soon as possible.  Submit refill requests through [employer] or call your pharmacy and they will forward the refill request to us. Please allow 3 business days for your refill to be completed.          Additional Information About Your Visit        PAIEONharHitch Information     [employer] gives you secure access to your electronic health record. If you see a primary care provider, you can also send messages to your care team and make appointments. If you have questions, please call your primary care clinic.  If you do not have a primary care provider, please call [phone] and they will assist you.      [employer] is an electronic gateway that provides easy, online access to your medical records. With [employer], you can request a clinic appointment, read your  test results, renew a prescription or communicate with your care team.     To access your existing account, please contact your [facility] or call [phone] for assistance.        Care EveryWhere ID     This is your Care EveryWhere ID. This could be used by other organizations to access your [place] medical records  [id number]        Your Vitals Were     Pulse Temperature Respirations Last Period Pulse Oximetry       98 96.9  F (36.1  C) (Oral) 18 11/19/2016 100%        Blood Pressure from Last 3 Encounters:   06/26/17 114/74   06/21/17 102/73   06/21/17 115/76    Weight from Last 3 Encounters:   06/21/17 75.3 kg (166 lb)   06/19/17 75.9 kg (167 lb 6.4 oz)   06/06/17 74.4 kg (164 lb)              Today, you had the following     No orders found for display       Primary Care Provider Office Phone # Fax #    Blanche Layla Marcelino PA-C [phone]       [facility] [address] [facility] [address]        Equal Access to Services     CARLINE REECE : Hadii aad ku hadasho Soomaali, waaxda luqadaha, qaybta kaalmada ademarycruzyada, tani givens . So [facility] [phone].    ATENCIÓN: Si habla español, tiene a rea disposición servicios gratuitos de asistencia lingüística. LlWhite Hospital [phone].    We comply with applicable federal civil rights laws and [place] laws. We do not discriminate on the basis of race, color, national origin, age, disability sex, sexual orientation or gender identity.            Thank you!     Thank you for choosing [facility]  for your care. Our goal is always to provide you with excellent care. Hearing back from our patients is one way we can continue to improve our services. Please take a few minutes to complete the written survey that you may receive in the mail after your visit with us. Thank you!             Your Updated Medication List - Protect others around you: Learn how to safely use,  store and throw away your medicines at www.disposemymeds.org.          This list is accurate as of: 6/26/17  1:16 PM.  Always use your most recent med list.                   Brand Name Dispense Instructions for use Diagnosis    ondansetron 4 MG ODT tab    ZOFRAN ODT    60 tablet    Take 1-2 tablets (4-8 mg) by mouth every 8 hours as needed for nausea or vomiting    Nausea/vomiting in pregnancy       prenatal multivitamin  plus iron 27-0.8 MG Tabs per tablet      Take 1 tablet by mouth daily

## 2017-07-06 ENCOUNTER — PRENATAL OFFICE VISIT (OUTPATIENT)
Dept: OBGYN | Facility: CLINIC | Age: 27
End: 2017-07-06
Payer: COMMERCIAL

## 2017-07-06 VITALS
SYSTOLIC BLOOD PRESSURE: 130 MMHG | DIASTOLIC BLOOD PRESSURE: 84 MMHG | BODY MASS INDEX: 28.42 KG/M2 | HEART RATE: 87 BPM | WEIGHT: 170.8 LBS | OXYGEN SATURATION: 98 %

## 2017-07-06 DIAGNOSIS — O21.9 NAUSEA/VOMITING IN PREGNANCY: ICD-10-CM

## 2017-07-06 DIAGNOSIS — O20.8 SUBCHORIONIC HEMORRHAGE IN FIRST TRIMESTER: ICD-10-CM

## 2017-07-06 DIAGNOSIS — Z34.80 SUPERVISION OF OTHER NORMAL PREGNANCY, ANTEPARTUM: ICD-10-CM

## 2017-07-06 DIAGNOSIS — O20.0 THREATENED SPONTANEOUS ABORTION: ICD-10-CM

## 2017-07-06 DIAGNOSIS — O99.013 ANEMIA AFFECTING PREGNANCY, THIRD TRIMESTER: ICD-10-CM

## 2017-07-06 DIAGNOSIS — O30.043 DICHORIONIC DIAMNIOTIC TWIN PREGNANCY IN THIRD TRIMESTER: ICD-10-CM

## 2017-07-06 PROCEDURE — 59426 ANTEPARTUM CARE ONLY: CPT | Performed by: OBSTETRICS & GYNECOLOGY

## 2017-07-06 PROCEDURE — 99207 ZZC PRENATAL VISIT: CPT | Performed by: OBSTETRICS & GYNECOLOGY

## 2017-07-06 NOTE — MR AVS SNAPSHOT
After Visit Summary   2017    Shwetha Issa    MRN: [id number]           Patient Information     Date Of Birth          1990        Visit Information        Provider Department      2017 10:15 AM Barron Redding MD [facility]        Today's Diagnoses     Anemia affecting pregnancy, third trimester        Supervision of other normal pregnancy, antepartum        Dichorionic diamniotic twin pregnancy in third trimester        Subchorionic hemorrhage in first trimester        Nausea/vomiting in pregnancy        Threatened spontaneous            Follow-ups after your visit        Who to contact     If you have questions or need follow up information about today's clinic visit or your schedule please contact [facility] directly at [phone].  Normal or non-critical lab and imaging results will be communicated to you by MyChart, letter or phone within 4 business days after the clinic has received the results. If you do not hear from us within 7 days, please contact the clinic through [employer] or phone. If you have a critical or abnormal lab result, we will notify you by phone as soon as possible.  Submit refill requests through [employer] or call your pharmacy and they will forward the refill request to us. Please allow 3 business days for your refill to be completed.          Additional Information About Your Visit        MyChart Information     [employer] gives you secure access to your electronic health record. If you see a primary care provider, you can also send messages to your care team and make appointments. If you have questions, please call your primary care clinic.  If you do not have a primary care provider, please call [phone] and they will assist you.        Care EveryWhere ID     This is your Care EveryWhere ID. This could be used by other organizations to access your [place] medical records  [id number]        Your Vitals Were      Pulse Last Period Pulse Oximetry BMI (Body Mass Index)          87 11/19/2016 98% 28.42 kg/m2         Blood Pressure from Last 3 Encounters:   07/06/17 130/84   06/26/17 114/74   06/21/17 102/73    Weight from Last 3 Encounters:   07/06/17 170 lb 12.8 oz (77.5 kg)   06/21/17 166 lb (75.3 kg)   06/19/17 167 lb 6.4 oz (75.9 kg)              Today, you had the following     No orders found for display       Primary Care Provider Office Phone # Fax #    Blanche Layla Marcelino PA-C [phone]       [facility] [address]  [facility] [address]        Equal Access to Services     MUSA REECE : Hadii aad ku hadasho Soomaali, waaxda luqadaha, qaybta kaalmada adeegyada, waxay idiin hayaan randy khke givens . So [facility] [phone].    ATENCIÓN: Si habla español, tiene a rea disposición servicios gratuitos de asistencia lingüística. Llame al [phone].    We comply with applicable federal civil rights laws and [place] laws. We do not discriminate on the basis of race, color, national origin, age, disability sex, sexual orientation or gender identity.            Thank you!     Thank you for choosing [facility]  for your care. Our goal is always to provide you with excellent care. Hearing back from our patients is one way we can continue to improve our services. Please take a few minutes to complete the written survey that you may receive in the mail after your visit with us. Thank you!             Your Updated Medication List - Protect others around you: Learn how to safely use, store and throw away your medicines at www.disposemymeds.org.          This list is accurate as of: 7/6/17 10:35 AM.  Always use your most recent med list.                   Brand Name Dispense Instructions for use Diagnosis    ondansetron 4 MG ODT tab    ZOFRAN ODT    60 tablet    Take 1-2 tablets (4-8 mg) by mouth every 8 hours as needed for nausea or vomiting    Nausea/vomiting in  pregnancy       prenatal multivitamin  plus iron 27-0.8 MG Tabs per tablet      Take 1 tablet by mouth daily

## 2017-07-06 NOTE — NURSING NOTE
"Chief Complaint   Patient presents with     Prenatal Care     32.5 weeks       Initial /84 (BP Location: Left arm, Cuff Size: Adult Regular)  Pulse 87  Wt 170 lb 12.8 oz (77.5 kg)  LMP 11/19/2016  SpO2 98%  BMI 28.42 kg/m2 Estimated body mass index is 28.42 kg/(m^2) as calculated from the following:    Height as of 10/14/16: 5' 5\" (1.651 m).    Weight as of this encounter: 170 lb 12.8 oz (77.5 kg).  Medication Reconciliation: complete   ARTUR Ingram 7/6/2017         "

## 2017-07-07 NOTE — PROGRESS NOTES
No signif signs, symptoms or concerns. Having chiropractic treatment and M follow-up too. Getting  . Will call to arrange Tdap soon. Advice appropriate to gestational age reviewed including pertinent Checklist items. Discussed condition, tests and care plan including RBA. Problem list updated. Hgb next.  A/P:  Shwetha was seen today for prenatal care.    Diagnoses and all orders for this visit:    Anemia affecting pregnancy, third trimester    Supervision of other normal pregnancy, antepartum    Dichorionic diamniotic twin pregnancy in third trimester    Subchorionic hemorrhage in first trimester    Nausea/vomiting in pregnancy    Threatened spontaneous         Barron Redding MD

## 2017-07-20 ENCOUNTER — PRENATAL OFFICE VISIT (OUTPATIENT)
Dept: OBGYN | Facility: CLINIC | Age: 27
End: 2017-07-20
Payer: COMMERCIAL

## 2017-07-20 VITALS
SYSTOLIC BLOOD PRESSURE: 136 MMHG | TEMPERATURE: 97.6 F | BODY MASS INDEX: 24.63 KG/M2 | HEART RATE: 65 BPM | DIASTOLIC BLOOD PRESSURE: 96 MMHG | OXYGEN SATURATION: 99 % | WEIGHT: 148 LBS

## 2017-07-20 DIAGNOSIS — Z23 NEED FOR TDAP VACCINATION: ICD-10-CM

## 2017-07-20 DIAGNOSIS — O99.013 ANEMIA AFFECTING PREGNANCY, THIRD TRIMESTER: ICD-10-CM

## 2017-07-20 LAB
ERYTHROCYTE [DISTWIDTH] IN BLOOD BY AUTOMATED COUNT: 18.4 % (ref 10–15)
HCT VFR BLD AUTO: 39.5 % (ref 35–47)
HGB BLD-MCNC: 13 G/DL (ref 11.7–15.7)
MCH RBC QN AUTO: 28.8 PG (ref 26.5–33)
MCHC RBC AUTO-ENTMCNC: 32.9 G/DL (ref 31.5–36.5)
MCV RBC AUTO: 88 FL (ref 78–100)
PLATELET # BLD AUTO: 210 10E9/L (ref 150–450)
RBC # BLD AUTO: 4.51 10E12/L (ref 3.8–5.2)
WBC # BLD AUTO: 8.3 10E9/L (ref 4–11)

## 2017-07-20 PROCEDURE — 90715 TDAP VACCINE 7 YRS/> IM: CPT | Performed by: OBSTETRICS & GYNECOLOGY

## 2017-07-20 PROCEDURE — 90471 IMMUNIZATION ADMIN: CPT | Performed by: OBSTETRICS & GYNECOLOGY

## 2017-07-20 PROCEDURE — 99213 OFFICE O/P EST LOW 20 MIN: CPT | Mod: 24 | Performed by: OBSTETRICS & GYNECOLOGY

## 2017-07-20 PROCEDURE — 85027 COMPLETE CBC AUTOMATED: CPT | Performed by: OBSTETRICS & GYNECOLOGY

## 2017-07-20 PROCEDURE — 36415 COLL VENOUS BLD VENIPUNCTURE: CPT | Performed by: OBSTETRICS & GYNECOLOGY

## 2017-07-20 RX ORDER — NIFEDIPINE 10 MG/1
CAPSULE ORAL
Refills: 0 | COMMUNITY
Start: 2017-07-18 | End: 2017-07-20

## 2017-07-20 NOTE — PATIENT INSTRUCTIONS
If you have any questions regarding your visit, Please contact your care team.     [facility]: [phone]    [facility] CLINIC HOURS TELEPHONE NUMBER   PER Villalobos-    Tricia Sinha-RANDOLPH Chen-Medical Assistant   Monday-[place]  8:00a.m-4:45 p.m  Wednesday-[place] 8:00a.m-4:45 p.m.  Thursday-[place]  8:00a.m-4:45 p.m.  Friday-[place]  8:00a.m-4:45 p.m. [facility]  [address]  [place], [place] [address] [phone] ask [facility]  [phone] Fax  Imaging [phone]    [facility]  [address] [place] [address] [address].  [place], [place] [address] [phone]    [facility]  [address] [address]  [phone] ask [facility]  [phone] Fax  Imaging [phone]     Urgent Care locations:    [place]        [place] Monday-Friday  5 pm - 9 pm  Saturday and Sunday   9 am - 5 pm    Monday-Friday   11 am - 9 pm  Saturday and Sunday   9 am - 5 pm   [phone]       If you need a medication refill, please contact your pharmacy. Please allow 3 business days for your refill to be completed.  As always, Thank you for trusting us with your healthcare needs!

## 2017-07-20 NOTE — NURSING NOTE
Screening Questionnaire for Adult Immunization    Are you sick today?   No   Do you have allergies to medications, food, a vaccine component or latex?   No   Have you ever had a serious reaction after receiving a vaccination?   No   Do you have a long-term health problem with heart disease, lung disease, asthma, kidney disease, metabolic disease (e.g. diabetes), anemia, or other blood disorder?   No   Do you have cancer, leukemia, HIV/AIDS, or any other immune system problem?   No   In the past 3 months, have you taken medications that affect  your immune system, such as prednisone, other steroids, or anticancer drugs; drugs for the treatment of rheumatoid arthritis, Crohn s disease, or psoriasis; or have you had radiation treatments?   No   Have you had a seizure, or a brain or other nervous system problem?   No   During the past year, have you received a transfusion of blood or blood     products, or been given immune (gamma) globulin or antiviral drug?   No   For women: Are you pregnant or is there a chance you could become        pregnant during the next month?   No   Have you received any vaccinations in the past 4 weeks?   No     Immunization questionnaire answers were all negative.      MNVFC doesn't apply on this patient    Per orders of Dr. Redding, injection of Tdap given by Gustavo Rosado. Patient instructed to remain in clinic for 15 minutes afterwards, and to report any adverse reaction to me immediately.       Screening performed by Gustavo Rosado on 7/20/2017 at 2:54 PM.

## 2017-07-20 NOTE — NURSING NOTE
"Chief Complaint   Patient presents with     Hypertension     Hospital qatcsw-gj-Rncmcgf blood pressure       Initial BP (!) 136/96 (BP Location: Left arm)  Pulse 65  Temp 97.6  F (36.4  C) (Oral)  Wt 148 lb (67.1 kg)  LMP 11/19/2016  SpO2 99%  Breastfeeding? Yes  BMI 24.63 kg/m2 Estimated body mass index is 24.63 kg/(m^2) as calculated from the following:    Height as of 10/14/16: 5' 5\" (1.651 m).    Weight as of this encounter: 148 lb (67.1 kg).  Medication Reconciliation: complete   Gustavo Rosado CMA      "

## 2017-07-20 NOTE — MR AVS SNAPSHOT
After Visit Summary   7/20/2017    Shwetha Issa    MRN: [id number]           Patient Information     Date Of Birth          1990        Visit Information        Provider Department      7/20/2017 2:00 PM Barron Redding MD [facility]        Care Instructions                                                        If you have any questions regarding your visit, Please contact your care team.     [facility]: [phone]    [facility] CLINIC HOURS TELEPHONE NUMBER   Barron Redding M.D.      [place]-    Tricia Sinha-RANDOLPH Chen-Medical Assistant   Monday-[place]  8:00a.m-4:45 p.m  Wednesday-[place] 8:00a.m-4:45 p.m.  Thursday-[place]  8:00a.m-4:45 p.m.  Friday-[place]  8:00a.m-4:45 p.m. [facility]  [address] [place] [address] [place] [address]  [phone] ask [facility]  [phone] Fax  Imaging [phone]    [facility]  [address] [place] [address] Dr.  [place], [place] [address] [phone]    [facility]  [address] [place] [address] [phone] [facility]  [phone] Fax  Imaging [phone]     Urgent Care locations:    [facility] Monday-Friday  5 pm - 9 pm  Saturday and Sunday   9 am - 5 pm    Monday-Friday   11 am - 9 pm  Saturday and Sunday   9 am - 5 pm   [phone]       If you need a medication refill, please contact your pharmacy. Please allow 3 business days for your refill to be completed.  As always, Thank you for trusting us with your healthcare needs!            Follow-ups after your visit        Who to contact     If you have questions or need follow up information about today's clinic visit or your schedule please contact [facility] directly at [phone].  Normal or non-critical lab and imaging results will be communicated to you by  MyChart, letter or phone within 4 business days after the clinic has received the results. If you do not hear from us within 7 days, please contact the clinic through [employer] or phone. If you have a critical or abnormal lab result, we will notify you by phone as soon as possible.  Submit refill requests through [employer] or call your pharmacy and they will forward the refill request to us. Please allow 3 business days for your refill to be completed.          Additional Information About Your Visit        [employer] Information     [employer] gives you secure access to your electronic health record. If you see a primary care provider, you can also send messages to your care team and make appointments. If you have questions, please call your primary care clinic.  If you do not have a primary care provider, please call [phone] and they will assist you.        Care EveryWhere ID     This is your Care EveryWhere ID. This could be used by other organizations to access your [place] medical records  [id number]        Your Vitals Were     Pulse Temperature Last Period Pulse Oximetry Breastfeeding? BMI (Body Mass Index)    65 97.6  F (36.4  C) (Oral) 11/19/2016 99% Yes 24.63 kg/m2       Blood Pressure from Last 3 Encounters:   07/20/17 (!) 136/96   07/06/17 130/84   06/26/17 114/74    Weight from Last 3 Encounters:   07/20/17 148 lb (67.1 kg)   07/06/17 170 lb 12.8 oz (77.5 kg)   06/21/17 166 lb (75.3 kg)              Today, you had the following     No orders found for display       Primary Care Provider Office Phone # Fax #    Blanche Marcelino PA-C [phone]       [facility] [address]  [facility] [address]        Equal Access to Services     MUSA REECE : Hadii shalini schilling Sotoan, waaxda luqadaha, qaybta kaalmada adeegyada, tani harris. So [facility] [phone].    ATENCIÓN: Si habla español, tiene a rea disposición servicios gratuitos de asistencia  lingüística. Lisa al [phone].    We comply with applicable federal civil rights laws and [place] laws. We do not discriminate on the basis of race, color, national origin, age, disability sex, sexual orientation or gender identity.            Thank you!     Thank you for choosing [facility]  for your care. Our goal is always to provide you with excellent care. Hearing back from our patients is one way we can continue to improve our services. Please take a few minutes to complete the written survey that you may receive in the mail after your visit with us. Thank you!             Your Updated Medication List - Protect others around you: Learn how to safely use, store and throw away your medicines at www.disposemymeds.org.          This list is accurate as of: 7/20/17  2:25 PM.  Always use your most recent med list.                   Brand Name Dispense Instructions for use Diagnosis    NIFEdipine 10 MG capsule    PROCARDIA          ondansetron 4 MG ODT tab    ZOFRAN ODT    60 tablet    Take 1-2 tablets (4-8 mg) by mouth every 8 hours as needed for nausea or vomiting    Nausea/vomiting in pregnancy       prenatal multivitamin  plus iron 27-0.8 MG Tabs per tablet      Take 1 tablet by mouth daily

## 2017-07-22 PROBLEM — O20.0 THREATENED SPONTANEOUS ABORTION: Status: RESOLVED | Noted: 2017-01-12 | Resolved: 2017-07-22

## 2017-07-22 PROBLEM — Z34.80 SUPERVISION OF OTHER NORMAL PREGNANCY, ANTEPARTUM: Status: RESOLVED | Noted: 2017-01-12 | Resolved: 2017-07-22

## 2017-07-22 PROBLEM — O20.8 SUBCHORIONIC HEMORRHAGE IN FIRST TRIMESTER: Status: RESOLVED | Noted: 2017-01-12 | Resolved: 2017-07-22

## 2017-07-22 PROBLEM — O21.9 NAUSEA/VOMITING IN PREGNANCY: Status: RESOLVED | Noted: 2017-01-12 | Resolved: 2017-07-22

## 2017-07-22 PROBLEM — O30.049 DICHORIONIC DIAMNIOTIC TWIN GESTATION: Status: RESOLVED | Noted: 2017-01-12 | Resolved: 2017-07-22

## 2017-07-22 PROBLEM — O99.019 ANEMIA AFFECTING PREGNANCY: Status: RESOLVED | Noted: 2017-06-06 | Resolved: 2017-07-22

## 2017-07-22 NOTE — PROGRESS NOTES
Shwetha Issa is a 26 year old year old who is here today for a recheck of high BP after [facility] vaginal delivery at [facility] ([facility])- did well and infant boys are doing well in NICU. No repair. Had postpartum labetalol or nifedipine but off this now. Had wedding 7/8 too.   No signif symptoms. Breast feeding.       Past medical, obstetrical, surgical, family and social history reviewed and as noted or updated in chart.     Exam: BP borderline. Weight down. No edema.     A/P: Satisfactory recheck. RTC in 5 weeks for PPE. No treatment now. Tdap given Check CBC. Instructions reviewed. See orders.   Total encounter time= 15min. Direct counseling, education and care coordination time with the patient present= 10min.     Shwetha was seen today for hypertension.    Diagnoses and all orders for this visit:    Postpartum hypertension  -     CBC with platelets  -     TDAP VACCINE (ADACEL)    Anemia affecting pregnancy, third trimester  -     CBC with platelets    Need for Tdap vaccination  -     TDAP VACCINE (ADACEL)        Barron Redding MD

## 2017-08-03 ENCOUNTER — MYC MEDICAL ADVICE (OUTPATIENT)
Dept: OBGYN | Facility: CLINIC | Age: 27
End: 2017-08-03

## 2017-08-03 NOTE — LETTER
2017    RE: Shwetha Issa,  1990    To Whom It May Concern:    Shwetha Issa is under our care for her postpartum recovery following hospitalization at [facility] on 2017. She had a premature spontaneous vaginal delivery of twins on that date.      I anticipate that she will remain on maternity leave through 10/4/2017 and be able to return to work on 10/5/2017 with the following restrictions: none.      I hope this information is sufficient for your needs.  If you have any additional questions regarding this matter please contact our office.  Thank you.    Sincerely,        Barron Redding MD

## 2017-08-07 ENCOUNTER — MYC MEDICAL ADVICE (OUTPATIENT)
Dept: OBGYN | Facility: CLINIC | Age: 27
End: 2017-08-07

## 2017-08-07 NOTE — LETTER
2017    RE: Shwetha Issa,  1990    To Whom It May Concern:    Shwetha Issa is under our care for postpartum recovery.       She will return from maternity leave on 2017 with the following restrictions: none.      I hope this information is sufficient for your needs.  If you have any additional questions regarding this matter please contact our office.  Thank you.    Sincerely,        Barron Redding MD

## 2017-08-07 NOTE — TELEPHONE ENCOUNTER
We can complete the forms. The usual disability time after a vaginal delivery for twins is 6 weeks. It is understandable that patient may desire additional time off work and I will indicate that she is expecting to remain on maternity leave for 12 weeks from the birth. The additional time past 6 weeks is usually not covered under the patient's disability but patient should submit the forms and letter and await the decision. Please print attached letter on letterhead and I will sign. Please notify.   Barron Redding MD

## 2017-08-07 NOTE — LETTER
[address] [address]  Phone: [phone]    17    Shwetha Issa  [address] [facility] [address]      2017     RE: Shwetha Issa,  1990     To Whom It May Concern:     Shwetha Issa is under our care for postpartum recovery.        She will return from maternity leave on 2017 with the following restrictions: none.       I hope this information is sufficient for your needs.  If you have any additional questions regarding this matter please contact our office.  Thank you.     Sincerely,           Barron Redding MD

## 2017-08-08 NOTE — TELEPHONE ENCOUNTER
Called and spoke with patient. She will drop forms off today or tomorrow and will pick-up letter after forms are completed.    Gustavo Rosado, CMA

## 2017-08-11 ENCOUNTER — TELEPHONE (OUTPATIENT)
Dept: OBGYN | Facility: CLINIC | Age: 27
End: 2017-08-11

## 2017-08-11 NOTE — TELEPHONE ENCOUNTER
.What type of form?FMLA Paperwork  What day did you drop off your forms? 08/11/2017  Is there a due date? ASAP   How would you like to receive these forms? Fax and mail originals to home address    What is the best number to contact you? Home #  What time works best to contact you with in 4 hrs? Anytime  Is it okay to leave a message? Yes    Sahra Forde

## 2017-08-16 NOTE — TELEPHONE ENCOUNTER
Forms completed and faxed as requested. Copy sent to abstracting for scanning and to patients home address per request.    Gustavo Rosado, [facility]

## 2017-08-21 NOTE — TELEPHONE ENCOUNTER
August 7, 2017   Shwetha Issa is under our care for her postpartum recovery following hospitalization at [facility] on 7/12/2017.   She had a premature spontaneous vaginal delivery of twins on that date   anticipate that she will remain on maternity leave through 10/4/2017 and be able to return to work on 10/5/2017 with the following restrictions: none.     Will route to Dr. Redding for review & orders. Unsure if he can write a new letter for patient to have additional 4-5 weeks of FMLA.   Ernestine Dubon RN, BAN

## 2017-08-21 NOTE — TELEPHONE ENCOUNTER
Phone call to patient. Gave her update per Dr. Redding as below. She stated that her employer is the one who told her that she could get up to 6 mo short-term disability. Patient stated she only wants to take 16 wks total. Patient stated she would like to return to work on November 1. She would like the letter faxed to [phone] attn Sandra Perez. She would also like a hard copy mailed to her home address.   Explained will get an update to Dr. Redding. Patient appreciative of assistance.   Ernestine Dubon RN, BAN

## 2017-08-21 NOTE — TELEPHONE ENCOUNTER
Patient may check with Pediatrician about writing for any additional time but should check with her employer since administration of FMLA time is according to standard rules that they apply (the usually obligation is up to 12 weeks continuous time off) and additional time off is based on the employer's determination (they may be fine with extending patient's time off another 3 months for instance and it does not necessarily relate to the time spent in NICU). Please notify.  Barron Redding MD

## 2017-08-22 NOTE — TELEPHONE ENCOUNTER
Letter printed, signed and faxed to Sandra Perez at [phone] and confirmed at 1052.  Phone call to patient and explained letter was faxed and confirmed and am placing letter in the mail to her. Patient appreciative of assistance. Ernestine Dubon RN, BAN

## 2017-10-02 ENCOUNTER — OFFICE VISIT (OUTPATIENT)
Dept: FAMILY MEDICINE | Facility: CLINIC | Age: 27
End: 2017-10-02
Payer: COMMERCIAL

## 2017-10-02 VITALS
WEIGHT: 154 LBS | HEART RATE: 82 BPM | OXYGEN SATURATION: 99 % | TEMPERATURE: 98.3 F | SYSTOLIC BLOOD PRESSURE: 114 MMHG | BODY MASS INDEX: 25.63 KG/M2 | DIASTOLIC BLOOD PRESSURE: 70 MMHG

## 2017-10-02 DIAGNOSIS — Z28.21 VACCINATION NOT CARRIED OUT BECAUSE OF PATIENT REFUSAL: ICD-10-CM

## 2017-10-02 DIAGNOSIS — N76.0 BACTERIAL VAGINOSIS: ICD-10-CM

## 2017-10-02 DIAGNOSIS — B37.31 YEAST VAGINITIS: Primary | ICD-10-CM

## 2017-10-02 DIAGNOSIS — J45.20 MILD INTERMITTENT ASTHMA WITHOUT COMPLICATION: ICD-10-CM

## 2017-10-02 DIAGNOSIS — B96.89 BACTERIAL VAGINOSIS: ICD-10-CM

## 2017-10-02 LAB
SPECIMEN SOURCE: ABNORMAL
WET PREP SPEC: ABNORMAL

## 2017-10-02 PROCEDURE — 99213 OFFICE O/P EST LOW 20 MIN: CPT | Performed by: FAMILY MEDICINE

## 2017-10-02 PROCEDURE — 87210 SMEAR WET MOUNT SALINE/INK: CPT | Performed by: FAMILY MEDICINE

## 2017-10-02 RX ORDER — METRONIDAZOLE 500 MG/1
500 TABLET ORAL 2 TIMES DAILY
Qty: 14 TABLET | Refills: 0 | Status: SHIPPED | OUTPATIENT
Start: 2017-10-02 | End: 2017-10-09

## 2017-10-02 RX ORDER — FLUCONAZOLE 150 MG/1
150 TABLET ORAL ONCE
Qty: 1 TABLET | Refills: 0 | Status: SHIPPED | OUTPATIENT
Start: 2017-10-02 | End: 2017-10-02

## 2017-10-02 RX ORDER — ALBUTEROL SULFATE 90 UG/1
2 AEROSOL, METERED RESPIRATORY (INHALATION) EVERY 4 HOURS PRN
Qty: 1 INHALER | Refills: 0 | Status: SHIPPED | OUTPATIENT
Start: 2017-10-02 | End: 2020-07-10

## 2017-10-02 ASSESSMENT — PAIN SCALES - GENERAL: PAINLEVEL: NO PAIN (0)

## 2017-10-02 NOTE — PROGRESS NOTES
SUBJECTIVE:   Shwetha Issa is a 27 year old female who presents to clinic today for the following health issues:      Vaginal Symptoms  Onset: about 3-4 days ago    Description:  Vaginal Discharge: white, thick   Itching (Pruritis): yes   Burning sensation:  no   Odor: no     Accompanying Signs & Symptoms:  Pain with Urination: no   Abdominal Pain: no   Fever: no     History:   Sexually active: YES - recently became intimate again with her partner after having twins in July, noticed symptoms shortly thereafter  New Partner: no   Possibility of Pregnancy:  Don't Know    Precipitating factors:   Recent Antibiotic Use: no     Alleviating factors:  none    Therapies Tried and outcome: AZO; has tried Monistat in the past but was ineffective for her      Past medical, family, and social histories, medications, and allergies are reviewed and updated in Epic.     ROS:  C: NEGATIVE for fever, chills, change in weight  E/M: NEGATIVE for ear, mouth and throat problems  R: ACT = 24. NEGATIVE for significant cough or SOB  CV: NEGATIVE for chest pain, palpitations or peripheral edema  ROS otherwise negative    Any history above obtained by the Medical Assistant was reviewed by Dr. Oniel Davies MD, and edited when necessary.    This document serves as a record of the services and decisions personally performed and made by Dr. Davies. It was created on his behalf by Peggy Tate, a trained medical scribe. The creation of this document is based the provider's statements to the medical scribe.  Peggy Tate October 2, 2017 3:27 PM     OBJECTIVE:                                                    /70 (BP Location: Left arm, Patient Position: Chair, Cuff Size: Adult Regular)  Pulse 82  Temp 98.3  F (36.8  C) (Oral)  Wt 69.9 kg (154 lb)  LMP 11/19/2016  SpO2 99%  BMI 25.63 kg/m2   Body mass index is 25.63 kg/(m^2).     GENERAL: healthy, alert and no distress  EYES: Eyes grossly normal to inspection, PERRL,  EOMI, sclerae white and conjunctivae normal  MS: no gross musculoskeletal defects noted, no edema  SKIN: no suspicious lesions or rashes  NEURO: Normal strength and tone, sensory exam grossly normal, mentation intact, oriented times 3 and cranial nerves 2-12 intact  PSYCH: mentation appears normal, affect normal/bright     Diagnostic Test Results:  Results for orders placed or performed in visit on 10/02/17 (from the past 24 hour(s))   Wet prep   Result Value Ref Range    Specimen Description Vagina     Wet Prep No Trichomonas seen     Wet Prep Clue cells seen (A)     Wet Prep Yeast seen (A)         ASSESSMENT/PLAN:                                                      (B37.3) Yeast vaginitis  (primary encounter diagnosis)  Comment:   Plan: Wet prep, fluconazole (DIFLUCAN) 150 MG tablet        Follow up as needed.     (N76.0,  B96.89) Bacterial vaginosis  Comment: Asymptomatic, but the patient wants to be treated anyway.   Plan: metroNIDAZOLE (FLAGYL) 500 MG tablet            (J45.20) Intermittent asthma without complication, unspecified asthma severity  Comment: well controlled, but I want to be sure she has an inhaler available.   Plan: Asthma Action Plan (AAP), albuterol (PROAIR         HFA/PROVENTIL HFA/VENTOLIN HFA) 108 (90 BASE)         MCG/ACT Inhaler          (Z28.21) Vaccination not carried out because of patient refusal  Comment: Influenza and HPV vaccines offered but declined by the patient.   Plan: She is encouraged to receive these shots at her post-partum visit with Dr. Redding in the near future.    The information in this document, created by the medical scribe for me, accurately reflects the services I personally performed and the decisions made by me. I have reviewed and approved this document for accuracy prior to leaving the patient care area. October 2, 2017 3:27 PM   Oniel Davies MD

## 2017-10-02 NOTE — LETTER
October 3, 2017      Shwetha Issa  [address] [facility] [address]            Dear Shwetha Issa    Remember to schedule your post-partem check and to get your HPV and influenza vaccines.      Enclosed is a copy of the results.  It was a pleasure to see you at your last appointment.    If you have any questions or concerns, please call myself or my nurse at [phone].      Sincerely,      Oniel Davies MD/DONY Felder MA      Results for orders placed or performed in visit on 10/02/17   Wet prep   Result Value Ref Range    Specimen Description Vagina     Wet Prep No Trichomonas seen     Wet Prep Clue cells seen (A)     Wet Prep Yeast seen (A)

## 2017-10-02 NOTE — MR AVS SNAPSHOT
After Visit Summary   10/2/2017    Shwetha Issa    MRN: [id number]           Patient Information     Date Of Birth          1990        Visit Information        Provider Department      10/2/2017 2:40 PM Oniel Davies MD [facility]        Today's Diagnoses     Yeast vaginitis    -  1    Bacterial vaginosis        Vaccination not carried out because of patient refusal          Care Instructions        ================================================================================  Normal Values   Blood pressure  <140/90 for most adults    <130/80 for some chronic diseases (ask your care team about yours)    BMI (body mass index)  18.5-25 kg/m2 (based on height and weight)     Thank you for visiting [facility]    Normal or non-critical lab and imaging results will be communicated to you by MyChart, letter or phone within 7 days.  If you do not hear from us within 10 days, please call the clinic. If you have a critical or abnormal lab result, we will notify you by phone as soon as possible.     If you have any questions regarding your visit please contact:     Team Comfort:   Clinic Hours Telephone Number   Dr. Oniel Campuzano 7am-5pm  Monday - Friday [phone]  Ross Lopez RN   Pharmacy 8:00am-8pm Monday-Friday    9am-5pm Saturday-Sunday [phone]   Urgent Care 11am-9pm Monday-Friday        9am-5pm Saturday-Sunday [phone]     After hours, weekend or if you need to make an appointment with your primary provider please call [phone].   After Hours nurse advise: call [place] [facility]: [phone]    Medication Refills:  Call your pharmacy and they will forward the refill to us. Please allow 3 business days for your refills to be completed.                  Follow-ups after your visit        Follow-up notes from your care team     Return for post-partum check (and HPV  and flu shots) in the near future.      Who to contact     If you have questions or need follow up information about today's clinic visit or your schedule please contact [facility] ASHLY HENRIQUEZ directly at [phone].  Normal or non-critical lab and imaging results will be communicated to you by DocOnYouhart, letter or phone within 4 business days after the clinic has received the results. If you do not hear from us within 7 days, please contact the clinic through DocOnYouhart or phone. If you have a critical or abnormal lab result, we will notify you by phone as soon as possible.  Submit refill requests through [employer] or call your pharmacy and they will forward the refill request to us. Please allow 3 business days for your refill to be completed.          Additional Information About Your Visit        DocOnYouhart Information     [employer] gives you secure access to your electronic health record. If you see a primary care provider, you can also send messages to your care team and make appointments. If you have questions, please call your primary care clinic.  If you do not have a primary care provider, please call [phone] and they will assist you.        Care EveryWhere ID     This is your Care EveryWhere ID. This could be used by other organizations to access your [place] medical records  [id number]        Your Vitals Were     Pulse Temperature Last Period Pulse Oximetry BMI (Body Mass Index)       82 98.3  F (36.8  C) (Oral) 11/19/2016 99% 25.63 kg/m2        Blood Pressure from Last 3 Encounters:   10/02/17 114/70   07/20/17 (!) 136/96   07/06/17 130/84    Weight from Last 3 Encounters:   10/02/17 154 lb (69.9 kg)   07/20/17 148 lb (67.1 kg)   07/06/17 170 lb 12.8 oz (77.5 kg)              We Performed the Following     Wet prep          Today's Medication Changes          These changes are accurate as of: 10/2/17  3:35 PM.  If you have any questions, ask your nurse or doctor.               Start taking these  medicines.        Dose/Directions    fluconazole 150 MG tablet   Commonly known as:  DIFLUCAN   Used for:  Yeast vaginitis   Started by:  Oniel Davies MD        Dose:  150 mg   Take 1 tablet (150 mg) by mouth once for 1 dose for yeast infection.   Quantity:  1 tablet   Refills:  0       metroNIDAZOLE 500 MG tablet   Commonly known as:  FLAGYL   Used for:  Bacterial vaginosis   Started by:  Oniel Davies MD        Dose:  500 mg   Take 1 tablet (500 mg) by mouth 2 times daily for 7 days for bacterial vaginosis.   Quantity:  14 tablet   Refills:  0            Where to get your medicines      These [place] [facility] [place] [address]  [address], [facility] [address]     Phone:  [phone]     fluconazole 150 MG tablet    metroNIDAZOLE 500 MG tablet                Primary Care Provider Office Phone # Fax #    Blanche Caseymaximino Marcelino PA-C [phone]       [address]  [facility] [address]        Equal Access to Services     [facility]: Hadii aad ku hadasho Soomaali, waaxda luqadaha, qaybta kaalmada adeegyada, waxay idiin hayjabier givens . So [facility] [phone].    ATENCIÓN: Si habla español, tiene a rea disposición servicios gratuitos de asistencia lingüística. Llame al [phone].    We comply with applicable federal civil rights laws and [place] laws. We do not discriminate on the basis of race, color, national origin, age, disability, sex, sexual orientation, or gender identity.            Thank you!     Thank you for choosing [facility]  for your care. Our goal is always to provide you with excellent care. Hearing back from our patients is one way we can continue to improve our services. Please take a few minutes to complete the written survey that you may receive in the mail after your visit with us. Thank you!             Your Updated Medication List - Protect others around you:  Learn how to safely use, store and throw away your medicines at www.disposemymeds.org.          This list is accurate as of: 10/2/17  3:35 PM.  Always use your most recent med list.                   Brand Name Dispense Instructions for use Diagnosis    fluconazole 150 MG tablet    DIFLUCAN    1 tablet    Take 1 tablet (150 mg) by mouth once for 1 dose for yeast infection.    Yeast vaginitis       metroNIDAZOLE 500 MG tablet    FLAGYL    14 tablet    Take 1 tablet (500 mg) by mouth 2 times daily for 7 days for bacterial vaginosis.    Bacterial vaginosis

## 2017-10-02 NOTE — PATIENT INSTRUCTIONS
================================================================================  Normal Values   Blood pressure  <140/90 for most adults    <130/80 for some chronic diseases (ask your care team about yours)    BMI (body mass index)  18.5-25 kg/m2 (based on height and weight)     Thank you for visiting [facility]    Normal or non-critical lab and imaging results will be communicated to you by MyChart, letter or phone within 7 days.  If you do not hear from us within 10 days, please call the clinic. If you have a critical or abnormal lab result, we will notify you by phone as soon as possible.     If you have any questions regarding your visit please contact:     Team Comfort:   Clinic Hours Telephone Number   Dr. Oniel Wiseman [address] [place] 7am-5pm  Monday - Friday [phone]  Ross RN  Fallon RN  Jessica RN   Pharmacy 8:00am-8pm Monday-Friday    9am-5pm Saturday-Sunday [phone]   Urgent Care 11am-9pm Monday-Friday        9am-5pm Saturday-Sunday [phone]     After hours, weekend or if you need to make an appointment with your primary provider please call [phone].   After Hours nurse advise: call [place] [facility]: [phone]    Medication Refills:  Call your pharmacy and they will forward the refill to us. Please allow 3 business days for your refills to be completed.

## 2017-10-02 NOTE — LETTER
My Asthma Action Plan  Name: Shwetha Issa   YOB: 1990  Date: 10/2/2017   My doctor: Oniel Davies MD   My clinic: [facility]        My Control Medicine: None  My Rescue Medicine: Albuterol (Proair/Ventolin/Proventil) inhaler :  2 puffs every 4 hours as needed, or as directed below:  My Asthma Severity: intermittent  Avoid your asthma triggers.               GREEN ZONE   Good Control    I feel good    No cough or wheeze    Can work, sleep and play without asthma symptoms       Take your asthma control medicine every day.     1. If exercise triggers your asthma, take your rescue medication    15 minutes before exercise or sports, and    During exercise if you have asthma symptoms  2. Spacer to use with inhaler: If you have a spacer, make sure to use it with your inhaler             YELLOW ZONE Getting Worse  I have ANY of these:    I do not feel good    Cough or wheeze    Chest feels tight    Wake up at night   1. Keep taking your Green Zone medications  2. Start taking your rescue medicine:    every 20 minutes for up to 1 hour. Then every 4 hours for 24-48 hours.  3. If you stay in the Yellow Zone for more than 12-24 hours, contact your doctor.  4. If you do not return to the Green Zone in 12-24 hours or you get worse, start taking your oral steroid medicine if prescribed by your provider.           RED ZONE Medical Alert - Get Help  I have ANY of these:    I feel awful    Medicine is not helping    Breathing getting harder    Trouble walking or talking    Nose opens wide to breathe       1. Take your rescue medicine NOW  2. If your provider has prescribed an oral steroid medicine, start taking it NOW  3. Call your doctor NOW  4. If you are still in the Red Zone after 20 minutes and you have not reached your doctor:    Take your rescue medicine again and    Call 911 or go to the emergency room right away    See your regular doctor within 2 weeks of an Emergency Room or Urgent  Care visit for follow-up treatment.        Electronically signed by: Bebeto Woods, October 2, 2017    Annual Reminders:  Meet with Asthma Educator,  Flu Shot in the Fall, consider Pneumonia Vaccination for patients with asthma (aged 19 and older).    Pharmacy:    [facility] [place] [facility] [address] [facility]  [facility] [place] [facility], [place] - [address] [facility]                    Asthma Triggers  How To Control Things That Make Your Asthma Worse    Triggers are things that make your asthma worse.  Look at the list below to help you find your triggers and what you can do about them.  You can help prevent asthma flare-ups by staying away from your triggers.      Trigger                                                          What you can do   Cigarette Smoke  Tobacco smoke can make asthma worse. Do not allow smoking in your home, car or around you.  Be sure no one smokes at a child s day care or school.  If you smoke, ask your health care provider for ways to help you quit.  Ask family members to quit too.  Ask your health care provider for a referral to Quit Plan to help you quit smoking, or call 6-027-243PLAN.     Colds, Flu, Bronchitis  These are common triggers of asthma. Wash your hands often.  Don t touch your eyes, nose or mouth.  Get a flu shot every year.     Dust Mites  These are tiny bugs that live in cloth or carpet. They are too small to see. Wash sheets and blankets in hot water every week.   Encase pillows and mattress in dust mite proof covers.  Avoid having carpet if you can. If you have carpet, vacuum weekly.   Use a dust mask and HEPA vacuum.   Pollen and Outdoor Mold  Some people are allergic to trees, grass, or weed pollen, or molds. Try to keep your windows closed.  Limit time out doors when pollen count is high.   Ask you health care provider about taking medicine during allergy season.     Animal Dander  Some people are allergic to skin flakes, urine or saliva from pets  with fur or feathers. Keep pets with fur or feathers out of your home.    If you can t keep the pet outdoors, then keep the pet out of your bedroom.  Keep the bedroom door closed.  Keep pets off cloth furniture and away from stuffed toys.     Mice, Rats, and Cockroaches  Some people are allergic to the waste from these pests.   Cover food and garbage.  Clean up spills and food crumbs.  Store grease in the refrigerator.   Keep food out of the bedroom.   Indoor Mold  This can be a trigger if your home has high moisture. Fix leaking faucets, pipes, or other sources of water.   Clean moldy surfaces.  Dehumidify basement if it is damp and smelly.   Smoke, Strong Odors, and Sprays  These can reduce air quality. Stay away from strong odors and sprays, such as perfume, powder, hair spray, paints, smoke incense, paint, cleaning products, candles and new carpet.   Exercise or Sports  Some people with asthma have this trigger. Be active!  Ask your doctor about taking medicine before sports or exercise to prevent symptoms.    Warm up for 5-10 minutes before and after sports or exercise.     Other Triggers of Asthma  Cold air:  Cover your nose and mouth with a scarf.  Sometimes laughing or crying can be a trigger.  Some medicines and food can trigger asthma.

## 2017-10-03 ASSESSMENT — ASTHMA QUESTIONNAIRES: ACT_TOTALSCORE: 24

## 2017-10-06 ENCOUNTER — TELEPHONE (OUTPATIENT)
Dept: OBGYN | Facility: CLINIC | Age: 27
End: 2017-10-06

## 2017-10-06 NOTE — LETTER
2017        RE: Shwetha Issa,  1990    To Whom It May Concern: Attn: Sandra Perez    Shwetha Issa is under our care and was seen at our office for prenatal and post partum care.    Due to her medical condition and complications in her pregnancy she was off work for pelvic rest 2017. Shwetha was able to return to work with restrictions starting  2017 through remainder of pregnancy. It was ok to modify schedule and limitations as needed due to high risk pregnancy. She had premature delivery of twins on 17 at 33 weeks gestation. Shwetha had extended medical leave for post-partum recovery and care of premature twins. She will be able to return to work on 2017 with the following restrictions: None.      I hope this information is sufficient for your needs.  If you have any additional questions regarding this matter please contact our office.  Thank you.      Sincerely,        Barron Redding MD

## 2017-10-06 NOTE — TELEPHONE ENCOUNTER
Reason for Call:  Other letter    Detailed comments: patient needs a letter for the dates she was on bed rest, start to finish and then also when dates start to finish reduced hours at work,  Please fax to [phone], Malika Perez . Need in the letter for why on bed rest and restricted hours.    Phone Number Patient can be reached at: Home number on file [phone] (home)    Best Time: any    Can we leave a detailed message on this number? YES    Call taken on 10/6/2017 at 3:08 PM by Angela Stein

## 2017-10-10 NOTE — TELEPHONE ENCOUNTER
.Reason for Call:    checking status of letter request    Detailed comments: none    Phone Number Patient can be reached at: Home number on file [phone] (home)    Best Time: anytime    Can we leave a detailed message on this number? YES    Call taken on 10/10/2017 at 8:18 AM by Christel Rodriguez

## 2017-10-12 ENCOUNTER — PRENATAL OFFICE VISIT (OUTPATIENT)
Dept: OBGYN | Facility: CLINIC | Age: 27
End: 2017-10-12
Payer: COMMERCIAL

## 2017-10-12 VITALS
SYSTOLIC BLOOD PRESSURE: 119 MMHG | HEART RATE: 60 BPM | DIASTOLIC BLOOD PRESSURE: 70 MMHG | TEMPERATURE: 98 F | WEIGHT: 153 LBS | BODY MASS INDEX: 25.46 KG/M2

## 2017-10-12 ASSESSMENT — PATIENT HEALTH QUESTIONNAIRE - PHQ9: SUM OF ALL RESPONSES TO PHQ QUESTIONS 1-9: 3

## 2017-10-12 NOTE — PATIENT INSTRUCTIONS
If you have any questions regarding your visit, Please contact your care team.     [employer]: [phone]    [facility] CLINIC HOURS TELEPHONE NUMBER   PER Villalobos-    Tricia Sinha-RANDOLPH Chen-Medical Assistant   Monday-[place]  8:00a.m-4:45 p.m  Wednesday-[place] 8:00a.m-4:45 p.m.  Thursday-[place]  8:00a.m-4:45 p.m.  Friday-[place]  8:00a.m-4:45 p.m. [facility]  [address]  [place], [place] [address] [phone] ask [facility]  [phone] Fax  Imaging [phone]    [facility] Labor and Delivery  [address] [place] [address] Dr.  [place], [place] [address] [phone]    [facility]  [address] [place] [address]  [phone] ask [facility]  [phone] Fax  Imaging [phone]     Urgent Care locations:    [place]        [place] Monday-Friday  5 pm - 9 pm  Saturday and Sunday   9 am - 5 pm    Monday-Friday   11 am - 9 pm  Saturday and Sunday   9 am - 5 pm   [phone]       If you need a medication refill, please contact your pharmacy. Please allow 3 business days for your refill to be completed.  As always, Thank you for trusting us with your healthcare needs!

## 2017-10-12 NOTE — NURSING NOTE
"Chief Complaint   Patient presents with     Post Partum Exam     DOD 7/12/17       Initial /70 (BP Location: Left arm, Patient Position: Chair, Cuff Size: Adult Regular)  Pulse 60  Temp 98  F (36.7  C) (Oral)  Wt 153 lb (69.4 kg)  LMP 10/08/2017  Breastfeeding? No  BMI 25.46 kg/m2 Estimated body mass index is 25.46 kg/(m^2) as calculated from the following:    Height as of 10/14/16: 5' 5\" (1.651 m).    Weight as of this encounter: 153 lb (69.4 kg).  Medication Reconciliation: complete   Gustavo Rosado CMA      "

## 2017-10-12 NOTE — MR AVS SNAPSHOT
After Visit Summary   10/12/2017    Shwetha Issa    MRN: [id number]           Patient Information     Date Of Birth          1990        Visit Information        Provider Department      10/12/2017 4:45 PM Barron Redding MD [facility]        Care Instructions                                                        If you have any questions regarding your visit, Please contact your care team.     [employer] [facility]: [phone]    [facility] CLINIC HOURS TELEPHONE NUMBER   Barron Redding M.D.      [place]-    Tricia Sinha-RANDOLPH Chen-Medical Assistant   Monday-[place]  8:00a.m-4:45 p.m  Wednesday-[place] 8:00a.m-4:45 p.m.  Thursday-[place]  8:00a.m-4:45 p.m.  Friday-[place]  8:00a.m-4:45 p.m. [facility]  [address] [place] [address] [place] [address]  [phone] ask [facility]  [phone] Fax  Imaging [phone]    [facility] Labor and Delivery  [address] [place] [address] Dr.  [place], [place] [address] [phone]    [facility]  [address]  [place], [place] [address] [phone] [facility]  [phone] Fax  Imaging [phone]     Urgent Care locations:    [facility] Monday-Friday  5 pm - 9 pm  Saturday and Sunday   9 am - 5 pm    Monday-Friday   11 am - 9 pm  Saturday and Sunday   9 am - 5 pm   [phone]       If you need a medication refill, please contact your pharmacy. Please allow 3 business days for your refill to be completed.  As always, Thank you for trusting us with your healthcare needs!            Follow-ups after your visit        Who to contact     If you have questions or need follow up information about today's clinic visit or your schedule please contact [facility] directly at [phone].  Normal or non-critical lab and imaging results will be communicated to you by  MyChart, letter or phone within 4 business days after the clinic has received the results. If you do not hear from us within 7 days, please contact the clinic through [employer] or phone. If you have a critical or abnormal lab result, we will notify you by phone as soon as possible.  Submit refill requests through [employer] or call your pharmacy and they will forward the refill request to us. Please allow 3 business days for your refill to be completed.          Additional Information About Your Visit        [employer] Information     [employer] gives you secure access to your electronic health record. If you see a primary care provider, you can also send messages to your care team and make appointments. If you have questions, please call your primary care clinic.  If you do not have a primary care provider, please call [phone] and they will assist you.        Care EveryWhere ID     This is your Care EveryWhere ID. This could be used by other organizations to access your [place] medical records  [id number]        Your Vitals Were     Pulse Temperature Last Period Breastfeeding? BMI (Body Mass Index)       60 98  F (36.7  C) (Oral) 10/08/2017 No 25.46 kg/m2        Blood Pressure from Last 3 Encounters:   10/12/17 119/70   10/02/17 114/70   07/20/17 (!) 136/96    Weight from Last 3 Encounters:   10/12/17 153 lb (69.4 kg)   10/02/17 154 lb (69.9 kg)   07/20/17 148 lb (67.1 kg)              Today, you had the following     No orders found for display       Primary Care Provider Office Phone # Fax #    Blanche Layla Marcelino PA-C [phone]       [address]  [facility] [address]        Equal Access to Services     [facility]: Hadii aad ku hadasho Soomaali, waaxda luqadaha, qaybta kaalmada adeegyada, tani givens . So [facility] [phone].    ATENCIÓN: Si habla español, tiene a rea disposición servicios gratuitos de asistencia lingüística. Llame al [phone].    We comply  with applicable federal civil rights laws and [place] laws. We do not discriminate on the basis of race, color, national origin, age, disability, sex, sexual orientation, or gender identity.            Thank you!     Thank you for choosing [facility]  for your care. Our goal is always to provide you with excellent care. Hearing back from our patients is one way we can continue to improve our services. Please take a few minutes to complete the written survey that you may receive in the mail after your visit with us. Thank you!             Your Updated Medication List - Protect others around you: Learn how to safely use, store and throw away your medicines at www.disposemymeds.org.          This list is accurate as of: 10/12/17  4:58 PM.  Always use your most recent med list.                   Brand Name Dispense Instructions for use Diagnosis    albuterol 108 (90 BASE) MCG/ACT Inhaler    PROAIR HFA/PROVENTIL HFA/VENTOLIN HFA    1 Inhaler    Inhale 2 puffs into the lungs every 4 hours as needed for asthma symptoms.    Mild intermittent asthma without complication

## 2017-10-13 NOTE — PROGRESS NOTES
Shwetha Issa is a 27 year old year old G 2 P 1 who is here today for a postpartum exam.    HPI:      Doing well and without signif sx or concerns. Recent yeast and BV treated and feels well. Currently bottle feeding infants. Here today alone. Infant twin boys doing well. Contraceptive method planned is condoms. No dyspareunia since delivery. PP depression screening as noted. See PHQ-9. Score = 3. Menses now.     Past medical, obstetrical, surgical, family and social history reviewed and as noted or updated in chart.     Allergies, meds and supplements are as noted or updated in chart.      ROS:     Systems reviewed include constitutional; breast;                 cardiac; respiratory; gastrointestinal; genitourinary;                                musculoskeletal; integumentary; psychological;                                hematologic/lymphatic and endocrine.                  These systems were negative for significant symptoms except                 for the following: none and see HPI.                                Exam:  VS as noted.                    Abd was                             normal or negative except for, or in particular noting, the following                pertinent findings: none.       Assessment: Postpartum exam    Plan and Recommendations: Symptoms, problems and concerns reviewed.  Discussed pregnancy, birth, future pregnancy plans, work plans and infant care issues.  Problem list updated and Pregnancy Episode closed. See orders. RTC in 12 months.  Flu and HPV vacs declined.     Shwetha was seen today for post partum exam.    Diagnoses and all orders for this visit:    Routine postpartum follow-up        Barron Redding MD

## 2017-11-03 ENCOUNTER — TELEPHONE (OUTPATIENT)
Dept: OBGYN | Facility: CLINIC | Age: 27
End: 2017-11-03

## 2017-11-03 DIAGNOSIS — B37.31 YEAST INFECTION OF THE VAGINA: Primary | ICD-10-CM

## 2017-11-03 RX ORDER — FLUCONAZOLE 150 MG/1
150 TABLET ORAL ONCE
Qty: 1 TABLET | Refills: 1 | Status: SHIPPED | OUTPATIENT
Start: 2017-11-03 | End: 2017-11-03

## 2017-11-03 NOTE — TELEPHONE ENCOUNTER
Patient delivered in July.  She was seen for her postpartum in October and was treated at that time for yeast and BV.  Patient stated she doesn't want to come in and be checked again.  She is experiencing yeast like symptoms.  She has thick white discharge that has no odor.  She has some itch and feels discomfort/sensitivity to her vaginal area.  She reports that it is not BV.  She doesn't want to use OTC creams because this never helps. I pended her pharmacy.  Please review for orders if appropriate.  Annette Tavares RN

## 2017-11-03 NOTE — TELEPHONE ENCOUNTER
May try empiric treatment for yeast vaginitis with Diflucan. Prescription sent. If persistent symptoms then appt advised to evaluation. Please notify.   Barron Redding MD

## 2017-11-03 NOTE — TELEPHONE ENCOUNTER
Notified patient.  She was pleased to get the prescription.  She understands that she will need to be seen if symptoms persist.  Annette Tavares RN

## 2017-12-27 ENCOUNTER — OFFICE VISIT (OUTPATIENT)
Dept: URGENT CARE | Facility: URGENT CARE | Age: 27
End: 2017-12-27
Payer: COMMERCIAL

## 2017-12-27 VITALS
HEART RATE: 71 BPM | DIASTOLIC BLOOD PRESSURE: 101 MMHG | WEIGHT: 135.8 LBS | BODY MASS INDEX: 22.6 KG/M2 | OXYGEN SATURATION: 96 % | TEMPERATURE: 98.9 F | SYSTOLIC BLOOD PRESSURE: 147 MMHG

## 2017-12-27 DIAGNOSIS — R11.2 NON-INTRACTABLE VOMITING WITH NAUSEA, UNSPECIFIED VOMITING TYPE: Primary | ICD-10-CM

## 2017-12-27 DIAGNOSIS — Z32.01 PREGNANCY TEST POSITIVE: ICD-10-CM

## 2017-12-27 LAB — HCG UR QL: POSITIVE

## 2017-12-27 PROCEDURE — 81025 URINE PREGNANCY TEST: CPT | Performed by: NURSE PRACTITIONER

## 2017-12-27 PROCEDURE — 99213 OFFICE O/P EST LOW 20 MIN: CPT | Performed by: NURSE PRACTITIONER

## 2017-12-27 RX ORDER — ONDANSETRON 4 MG/1
4 TABLET, FILM COATED ORAL EVERY 8 HOURS PRN
Qty: 18 TABLET | Refills: 0 | Status: SHIPPED | OUTPATIENT
Start: 2017-12-27 | End: 2018-01-03

## 2017-12-27 NOTE — PROGRESS NOTES
SUBJECTIVE:   Shwetha Issa is a 27 year old female who presents to clinic today for the following health issues:      vomiting      Duration: 5 days    Description (location/character/radiation): vomiting, fever    Intensity:  moderate    Accompanying signs and symptoms: fever, vomiting, unable to eat    History (similar episodes/previous evaluation): None    Precipitating or alleviating factors: None    Therapies tried and outcome: zofran             Problem list and histories reviewed & adjusted, as indicated.  Additional history: as documented    Patient Active Problem List   Diagnosis     CARDIOVASCULAR SCREENING; LDL GOAL LESS THAN 160     Intermittent asthma     Sickle cell trait (H)     Past Surgical History:   Procedure Laterality Date     HC COLP CERVIX/UPPER VAGINA      neg       Social History   Substance Use Topics     Smoking status: Never Smoker     Smokeless tobacco: Never Used     Alcohol use 0.0 oz/week     0 Standard drinks or equivalent per week     Family History   Problem Relation Age of Onset     Cardiovascular Other      Hypertrophic cardiomyopathy in cousin,  in .  Shwetha had normal Echo in .     Thrombophilia Other      sickle cell     DIABETES Other      Hypertension Other      GASTROINTESTINAL DISEASE Other      peptic ulcers     Blood Disease Maternal Grandfather      sickle cell disease     Blood Disease Mother      sickle cell trait     CEREBROVASCULAR DISEASE Maternal Grandmother      CEREBROVASCULAR DISEASE Paternal Grandmother          Current Outpatient Prescriptions   Medication Sig Dispense Refill     ondansetron (ZOFRAN) 4 MG tablet Take 1 tablet (4 mg) by mouth every 8 hours as needed for nausea 18 tablet 0     albuterol (PROAIR HFA/PROVENTIL HFA/VENTOLIN HFA) 108 (90 BASE) MCG/ACT Inhaler Inhale 2 puffs into the lungs every 4 hours as needed for asthma symptoms. 1 Inhaler 0     No Known Allergies      Reviewed and updated as needed this visit by clinical  staff     Reviewed and updated as needed this visit by Provider         ROS:  C: NEGATIVE for fever, chills, change in weight  E/M: NEGATIVE for ear, mouth and throat problems  R: NEGATIVE for significant cough or SOB  CV: NEGATIVE for chest pain, palpitations or peripheral edema  GI: POSITIVE for nausea and vomiting    OBJECTIVE:     BP (!) 147/101 (BP Location: Left arm, Patient Position: Chair, Cuff Size: Adult Regular)  Pulse 71  Temp 98.9  F (37.2  C) (Oral)  Wt 135 lb 12.8 oz (61.6 kg)  SpO2 96%  BMI 22.6 kg/m2  Body mass index is 22.6 kg/(m^2).  GENERAL: healthy, alert and no distress  NECK: no adenopathy, no asymmetry, masses, or scars and thyroid normal to palpation  RESP: lungs clear to auscultation - no rales, rhonchi or wheezes  CV: regular rate and rhythm, normal S1 S2, no S3 or S4, no murmur, click or rub, no peripheral edema and peripheral pulses strong  ABDOMEN: soft, nontender, no hepatosplenomegaly, no masses and bowel sounds normal  MS: no gross musculoskeletal defects noted, no edema    Diagnostic Test Results:  Results for orders placed or performed in visit on 12/27/17 (from the past 24 hour(s))   HCG qualitative urine   Result Value Ref Range    HCG Qual Urine Positive (A) NEG^Negative         ASSESSMENT/PLAN:       ICD-10-CM    1. Non-intractable vomiting with nausea, unspecified vomiting type R11.2 HCG qualitative urine     ondansetron (ZOFRAN) 4 MG tablet   2. Pregnancy test positive Z32.01      I discussed lab results with the patient.    Sahara Teresa NP  [facility]

## 2017-12-27 NOTE — NURSING NOTE
"Chief Complaint   Patient presents with     Vomiting     Patient complains of vomiting and fever       Initial BP (!) 147/101 (BP Location: Left arm, Patient Position: Chair, Cuff Size: Adult Regular)  Pulse 71  Temp 98.9  F (37.2  C) (Oral)  Wt 135 lb 12.8 oz (61.6 kg)  SpO2 96%  BMI 22.6 kg/m2 Estimated body mass index is 22.6 kg/(m^2) as calculated from the following:    Height as of 10/14/16: 5' 5\" (1.651 m).    Weight as of this encounter: 135 lb 12.8 oz (61.6 kg).  Medication Reconciliation: complete       Marleny Forde    "

## 2017-12-27 NOTE — MR AVS SNAPSHOT
After Visit Summary   12/27/2017    Shwetha Issa    MRN: [id number]           Patient Information     Date Of Birth          1990        Visit Information        Provider Department      12/27/2017 11:35 AM Sahara Teresa NP [facility]        Today's Diagnoses     Non-intractable vomiting with nausea, unspecified vomiting type    -  1    Pregnancy test positive           Follow-ups after your visit        Follow-up notes from your care team     Return if symptoms worsen or fail to improve.      Who to contact     If you have questions or need follow up information about today's clinic visit or your schedule please contact [facility] directly at [phone].  Normal or non-critical lab and imaging results will be communicated to you by MyChart, letter or phone within 4 business days after the clinic has received the results. If you do not hear from us within 7 days, please contact the clinic through [employer] or phone. If you have a critical or abnormal lab result, we will notify you by phone as soon as possible.  Submit refill requests through [employer] or call your pharmacy and they will forward the refill request to us. Please allow 3 business days for your refill to be completed.          Additional Information About Your Visit        MyChart Information     [employer] gives you secure access to your electronic health record. If you see a primary care provider, you can also send messages to your care team and make appointments. If you have questions, please call your primary care clinic.  If you do not have a primary care provider, please call [phone] and they will assist you.        Care EveryWhere ID     This is your Care EveryWhere ID. This could be used by other organizations to access your [place] medical records  [id number]        Your Vitals Were     Pulse Temperature Pulse Oximetry BMI (Body Mass Index)          71 98.9  F (37.2  C) (Oral) 96%  22.6 kg/m2         Blood Pressure from Last 3 Encounters:   12/27/17 (!) 147/101   10/12/17 119/70   10/02/17 114/70    Weight from Last 3 Encounters:   12/27/17 135 lb 12.8 oz (61.6 kg)   10/12/17 153 lb (69.4 kg)   10/02/17 154 lb (69.9 kg)              We Performed the Following     HCG qualitative urine          Today's Medication Changes          These changes are accurate as of: 12/27/17  3:09 PM.  If you have any questions, ask your nurse or doctor.               Start taking these medicines.        Dose/Directions    ondansetron 4 MG tablet   Commonly known as:  ZOFRAN   Used for:  Non-intractable vomiting with nausea, unspecified vomiting type   Started by:  Sahara Teresa NP        Dose:  4 mg   Take 1 tablet (4 mg) by mouth every 8 hours as needed for nausea   Quantity:  18 tablet   Refills:  0            Where to get your medicines      These medications were sent to [place] [address] [facility] [place]  [place] - [address]  [address] [facility] [address]     Phone:  [phone]     ondansetron 4 MG tablet                Primary Care Provider Office Phone # Fax #    Blanche Layla Marcelino PA-C [phone]       [address]  [facility] [address]        Equal Access to Services     CARLINE REECE : Hadii aad ku hadasho Soomaali, waaxda luqadaha, qaybta kaalmada adeegyada, tani harris. So [facility] [phone].    ATENCIÓN: Si habla español, tiene a rea disposición servicios gratuitos de asistencia lingüística. Lisa al [phone].    We comply with applicable federal civil rights laws and [place] laws. We do not discriminate on the basis of race, color, national origin, age, disability, sex, sexual orientation, or gender identity.            Thank you!     Thank you for choosing [facility]  for your care. Our goal is always to provide you with excellent care. Hearing back from our patients is one way we can continue to improve our  services. Please take a few minutes to complete the written survey that you may receive in the mail after your visit with us. Thank you!             Your Updated Medication List - Protect others around you: Learn how to safely use, store and throw away your medicines at www.disposemymeds.org.          This list is accurate as of: 12/27/17  3:09 PM.  Always use your most recent med list.                   Brand Name Dispense Instructions for use Diagnosis    albuterol 108 (90 BASE) MCG/ACT Inhaler    PROAIR HFA/PROVENTIL HFA/VENTOLIN HFA    1 Inhaler    Inhale 2 puffs into the lungs every 4 hours as needed for asthma symptoms.    Mild intermittent asthma without complication       ondansetron 4 MG tablet    ZOFRAN    18 tablet    Take 1 tablet (4 mg) by mouth every 8 hours as needed for nausea    Non-intractable vomiting with nausea, unspecified vomiting type

## 2018-10-22 ENCOUNTER — HEALTH MAINTENANCE LETTER (OUTPATIENT)
Age: 28
End: 2018-10-22

## 2019-02-25 ENCOUNTER — OFFICE VISIT (OUTPATIENT)
Dept: FAMILY MEDICINE | Facility: CLINIC | Age: 29
End: 2019-02-25
Payer: COMMERCIAL

## 2019-02-25 VITALS
BODY MASS INDEX: 22.39 KG/M2 | TEMPERATURE: 98 F | WEIGHT: 134.4 LBS | DIASTOLIC BLOOD PRESSURE: 70 MMHG | OXYGEN SATURATION: 100 % | SYSTOLIC BLOOD PRESSURE: 103 MMHG | HEART RATE: 78 BPM | HEIGHT: 65 IN

## 2019-02-25 DIAGNOSIS — N76.0 BV (BACTERIAL VAGINOSIS): ICD-10-CM

## 2019-02-25 DIAGNOSIS — Z11.3 SCREEN FOR STD (SEXUALLY TRANSMITTED DISEASE): ICD-10-CM

## 2019-02-25 DIAGNOSIS — B96.89 BV (BACTERIAL VAGINOSIS): ICD-10-CM

## 2019-02-25 DIAGNOSIS — R30.0 DYSURIA: Primary | ICD-10-CM

## 2019-02-25 DIAGNOSIS — R82.90 NONSPECIFIC FINDING ON EXAMINATION OF URINE: ICD-10-CM

## 2019-02-25 DIAGNOSIS — B37.31 CANDIDIASIS OF VAGINA: ICD-10-CM

## 2019-02-25 DIAGNOSIS — Z12.4 SCREENING FOR MALIGNANT NEOPLASM OF CERVIX: ICD-10-CM

## 2019-02-25 LAB
ALBUMIN UR-MCNC: 30 MG/DL
APPEARANCE UR: ABNORMAL
BACTERIA #/AREA URNS HPF: ABNORMAL /HPF
BILIRUB UR QL STRIP: NEGATIVE
COLOR UR AUTO: YELLOW
GLUCOSE UR STRIP-MCNC: NEGATIVE MG/DL
HGB UR QL STRIP: ABNORMAL
KETONES UR STRIP-MCNC: ABNORMAL MG/DL
LEUKOCYTE ESTERASE UR QL STRIP: ABNORMAL
NITRATE UR QL: NEGATIVE
NON-SQ EPI CELLS #/AREA URNS LPF: ABNORMAL /LPF
PH UR STRIP: 5.5 PH (ref 5–7)
RBC #/AREA URNS AUTO: ABNORMAL /HPF
SOURCE: ABNORMAL
SP GR UR STRIP: >1.03 (ref 1–1.03)
SPECIMEN SOURCE: ABNORMAL
UROBILINOGEN UR STRIP-ACNC: 0.2 EU/DL (ref 0.2–1)
WBC #/AREA URNS AUTO: ABNORMAL /HPF
WET PREP SPEC: ABNORMAL

## 2019-02-25 PROCEDURE — 87086 URINE CULTURE/COLONY COUNT: CPT | Performed by: NURSE PRACTITIONER

## 2019-02-25 PROCEDURE — 87210 SMEAR WET MOUNT SALINE/INK: CPT | Performed by: NURSE PRACTITIONER

## 2019-02-25 PROCEDURE — 87591 N.GONORRHOEAE DNA AMP PROB: CPT | Performed by: NURSE PRACTITIONER

## 2019-02-25 PROCEDURE — G0145 SCR C/V CYTO,THINLAYER,RESCR: HCPCS | Performed by: NURSE PRACTITIONER

## 2019-02-25 PROCEDURE — 81001 URINALYSIS AUTO W/SCOPE: CPT | Performed by: NURSE PRACTITIONER

## 2019-02-25 PROCEDURE — 87491 CHLMYD TRACH DNA AMP PROBE: CPT | Performed by: NURSE PRACTITIONER

## 2019-02-25 PROCEDURE — 99213 OFFICE O/P EST LOW 20 MIN: CPT | Performed by: NURSE PRACTITIONER

## 2019-02-25 RX ORDER — METRONIDAZOLE 500 MG/1
500 TABLET ORAL 2 TIMES DAILY
Qty: 14 TABLET | Refills: 0 | Status: SHIPPED | OUTPATIENT
Start: 2019-02-25 | End: 2019-04-16

## 2019-02-25 RX ORDER — FLUCONAZOLE 150 MG/1
150 TABLET ORAL ONCE
Qty: 1 TABLET | Refills: 0 | Status: SHIPPED | OUTPATIENT
Start: 2019-02-25 | End: 2019-02-25

## 2019-02-25 ASSESSMENT — MIFFLIN-ST. JEOR: SCORE: 1345.51

## 2019-02-25 NOTE — PATIENT INSTRUCTIONS
At [facility], we strive to deliver an exceptional experience to you, every time we see you.  If you receive a survey in the mail, please send us back your thoughts. We really do value your feedback.    Your care team:                            Family Medicine Internal Medicine   MD Yo Armenta MD Shantel Branch-Fleming, MD Katya Georgiev PA-C Megan Hill, APRN CNP    Deshaun Wiseman, MD Pediatrics   Steve Almendarez, TERESA Hopkins, MD Nichole Ochoa APRN CNP   MD Emma Saeed MD Deborah Mielke, MD Dottie Benites, APRN [facility]      Clinic hours: Monday - Thursday 7 am-7 pm;  7 am-5 pm.   Urgent care: Monday - Friday 11 am-9 pm; Saturday and  9 am-5 pm.  Pharmacy : Monday -Thursday 8 am-8 pm; Friday 8 am-6 pm; Saturday and  9 am-5 pm.     Clinic: [phone]   Pharmacy: [phone]        Patient Education     Bacterial Vaginosis    You have a vaginal infection called bacterial vaginosis (BV). Both good and bad bacteria are present in a healthy vagina. BV occurs when these bacteria get out of balance. The number of bad bacteria increase. And the number of good bacteria decrease. Although BV is associated with sexual activity, it is not a sexually transmitted disease.  BV may or may not cause symptoms. If symptoms do occur, they can include:    Thin, gray, milky-white, or sometimes green discharge    Unpleasant odor or  fishy  smell    Itching, burning, or pain in or around the vagina  It is not known what causes BV, but certain factors can make the problem more likely. This can include:    Douching    Having sex with a new partner    Having sex with more than one partner  BV will sometimes go away on its own. But treatment is usually recommended. This is because untreated BV can increase the risk of more serious health problems such as:    Pelvic inflammatory disease (PID)     delivery (giving birth to a baby  early if you re pregnant)    HIV and certain other sexually transmitted diseases (STDs)    Infection after surgery on the reproductive organs  Home care  General care    BV is most often treated with medicines called antibiotics. These may be given as pills or as a vaginal cream. If antibiotics are prescribed, be sure to use them exactly as directed. Also, be sure to complete all of the medicine, even if your symptoms go away.    Don't douche or having sex during treatment.    If you have sex with a female partner, ask your healthcare provider if she should also be treated.  Prevention    Don't douche.    Don't have sex. If you do have sex, then take steps to lower your risk:  ? Use condoms when having sex.  ? Limit the number of sexual partners you have.  Follow-up care  Follow up with your healthcare provider, or as advised.  When to seek medical advice  Call your healthcare provider right away if:    You have a fever of 100.4 F (38 C) or higher, or as directed by your provider.    Your symptoms worsen, or they don t go away within a few days of starting treatment.    You have new pain in the lower belly or pelvic region.    You have side effects that bother you or a reaction to the pills or cream you re prescribed.    You or any partners you have sex with have new symptoms, such as a rash, joint pain, or sores.  Date Last Reviewed: 10/1/2017    3798-1206 The [employer]. [address] [place] [address]. All rights reserved. This information is not intended as a substitute for professional medical care. Always follow your healthcare professional's instructions.           Patient Education     Bacterial Vaginosis    You have a vaginal infection called bacterial vaginosis (BV). Both good and bad bacteria are present in a healthy vagina. BV occurs when these bacteria get out of balance. The number of bad bacteria increase. And the number of good bacteria decrease. Although BV is associated with  sexual activity, it is not a sexually transmitted disease.  BV may or may not cause symptoms. If symptoms do occur, they can include:    Thin, gray, milky-white, or sometimes green discharge    Unpleasant odor or  fishy  smell    Itching, burning, or pain in or around the vagina  It is not known what causes BV, but certain factors can make the problem more likely. This can include:    Douching    Having sex with a new partner    Having sex with more than one partner  BV will sometimes go away on its own. But treatment is usually recommended. This is because untreated BV can increase the risk of more serious health problems such as:    Pelvic inflammatory disease (PID)     delivery (giving birth to a baby early if you re pregnant)    HIV and certain other sexually transmitted diseases (STDs)    Infection after surgery on the reproductive organs  Home care  General care    BV is most often treated with medicines called antibiotics. These may be given as pills or as a vaginal cream. If antibiotics are prescribed, be sure to use them exactly as directed. Also, be sure to complete all of the medicine, even if your symptoms go away.    Don't douche or having sex during treatment.    If you have sex with a female partner, ask your healthcare provider if she should also be treated.  Prevention    Don't douche.    Don't have sex. If you do have sex, then take steps to lower your risk:  ? Use condoms when having sex.  ? Limit the number of sexual partners you have.  Follow-up care  Follow up with your healthcare provider, or as advised.  When to seek medical advice  Call your healthcare provider right away if:    You have a fever of 100.4 F (38 C) or higher, or as directed by your provider.    Your symptoms worsen, or they don t go away within a few days of starting treatment.    You have new pain in the lower belly or pelvic region.    You have side effects that bother you or a reaction to the pills or cream you re  prescribed.    You or any partners you have sex with have new symptoms, such as a rash, joint pain, or sores.  Date Last Reviewed: 10/1/2017    7758-1478 [employer]. [address] [place] [address], [place] [address]. All rights reserved. This information is not intended as a substitute for professional medical care. Always follow your healthcare professional's instructions.           Patient Education     Yeast Infection (Candida Vaginal Infection)    You have a Candida vaginal infection. This is also known as a yeast infection. It is most often caused by a type of yeast (fungus) called Candida. Candida are normally found in the vagina. But if they increase in number, this can lead to infection and cause symptoms.  Symptoms of a yeast infection can include:    Clumpy or thin, white discharge, which may look like cottage cheese    Itching or burning    Burning with urination  Certain factors can make a yeast infection more likely. These can include:    Taking certain medicines, such as antibiotics or birth control pills    Pregnancy    Diabetes    Weak immune system  A yeast infection is most often treated with antifungal medicine. This may be given as a vaginal cream or pills you take by mouth. Treatment may last for about 1 to 7 days. Women with severe or recurrent infections may need longer courses of treatment.  Home care    If you re prescribed medicine, be sure to use it as directed. Finish all of the medicine, even if your symptoms go away. Note: Don t try to treat yourself using over-the-counter products without talking to your provider first. He or she will let you know if this is a good option for you.    Ask your provider what steps you can take to help reduce your risk of having a yeast infection in the future.  Follow-up care  Follow up with your healthcare provider, or as directed.  When to seek medical advice  Call your healthcare provider right away if:    You have a fever of 100.4 F (38 C) or  higher, or as directed by your provider.    Your symptoms worsen, or they don t go away within a few days of starting treatment.    You have new pain in the lower belly or pelvic region.    You have side effects that bother you or a reaction to the cream or pills you re prescribed.    You or any partners you have sex with have new symptoms, such as a rash, joint pain, or sores.  Date Last Reviewed: 10/1/2017    0601-9190 [employer]. [address] [place] [address]. All rights reserved. This information is not intended as a substitute for professional medical care. Always follow your healthcare professional's instructions.

## 2019-02-25 NOTE — PROGRESS NOTES
SUBJECTIVE:   Shwetha Issa is a 28 year old female who presents to clinic today for the following health issues:      Vaginal Symptoms      Duration: since yesterday    Description  vaginal discharge - creamy yellow, itching, burning and odor (sour)    Intensity:  mild    Accompanying signs and symptoms (fever/dysuria/abdominal or back pain): None    History  Sexually active: yes, single partner, contraception - none  Possibility of pregnancy: No  Recent antibiotic use: no     Precipitating or alleviating factors: None    Therapies tried and outcome: none   Outcome: nothing        Problem list and histories reviewed & adjusted, as indicated.  Additional history: as documented    Patient Active Problem List   Diagnosis     CARDIOVASCULAR SCREENING; LDL GOAL LESS THAN 160     Intermittent asthma     Sickle cell trait (H)     Past Surgical History:   Procedure Laterality Date     HC COLP CERVIX/UPPER VAGINA      neg       Social History     Tobacco Use     Smoking status: Never Smoker     Smokeless tobacco: Never Used   Substance Use Topics     Alcohol use: Yes     Alcohol/week: 0.0 oz     Family History   Problem Relation Age of Onset     Cardiovascular Other         Hypertrophic cardiomyopathy in cousin,  in .  Shwetha had normal Echo in .     Thrombophilia Other         sickle cell     Diabetes Other      Hypertension Other      Gastrointestinal Disease Other         peptic ulcers     Blood Disease Maternal Grandfather         sickle cell disease     Blood Disease Mother         sickle cell trait     Cerebrovascular Disease Maternal Grandmother      Cerebrovascular Disease Paternal Grandmother          Current Outpatient Medications   Medication Sig Dispense Refill     albuterol (PROAIR HFA/PROVENTIL HFA/VENTOLIN HFA) 108 (90 BASE) MCG/ACT Inhaler Inhale 2 puffs into the lungs every 4 hours as needed for asthma symptoms. 1 Inhaler 0     BP Readings from Last 3 Encounters:   19 103/70  "  12/27/17 (!) 147/101   10/12/17 119/70    Wt Readings from Last 3 Encounters:   02/25/19 61 kg (134 lb 6.4 oz)   12/27/17 61.6 kg (135 lb 12.8 oz)   10/12/17 69.4 kg (153 lb)                    Reviewed and updated as needed this visit by clinical staff  Tobacco  Allergies  Meds  Med Hx  Surg Hx  Fam Hx  Soc Hx      Reviewed and updated as needed this visit by Provider         ROS:  Constitutional, HEENT, cardiovascular, pulmonary, gi and gu systems are negative, except as otherwise noted.    OBJECTIVE:     /70   Pulse 78   Temp 98  F (36.7  C) (Oral)   Ht 1.651 m (5' 5\")   Wt 61 kg (134 lb 6.4 oz)   LMP 02/14/2019   SpO2 100%   BMI 22.37 kg/m    Body mass index is 22.37 kg/m .  GENERAL: healthy, alert and no distress  EYES: Eyes grossly normal to inspection, PERRL and conjunctivae and sclerae normal  HENT: ear canals and TM's normal, nose and mouth without ulcers or lesions  NECK: no adenopathy, no asymmetry, masses, or scars and thyroid normal to palpation  RESP: lungs clear to auscultation - no rales, rhonchi or wheezes  CV: regular rate and rhythm, normal S1 S2, no S3 or S4, no murmur, click or rub, no peripheral edema and peripheral pulses strong  ABDOMEN: soft, nontender, no hepatosplenomegaly, no masses and bowel sounds normal   (female): normal female external genitalia, normal urethral meatus, vaginal mucosa, normal cervix/adnexa/uterus without monica, creamy white discharge present, pap, wet prep, GC obtained  MS: no gross musculoskeletal defects noted, no edema  SKIN: no suspicious lesions or rashes  NEURO: Normal strength and tone, mentation intact and speech normal  BACK: no CVA tenderness, no paralumbar tenderness  PSYCH: mentation appears normal, affect normal/bright  LYMPH: normal ant/post cervical, supraclavicular nodes  inguinal: no adenopathy    Diagnostic Test Results:  Results for orders placed or performed in visit on 02/25/19   UA reflex to Microscopic and Culture "   Result Value Ref Range    Color Urine Yellow     Appearance Urine Slightly Cloudy     Glucose Urine Negative NEG^Negative mg/dL    Bilirubin Urine Negative NEG^Negative    Ketones Urine Trace (A) NEG^Negative mg/dL    Specific Gravity Urine >1.030 1.003 - 1.035    Blood Urine Trace (A) NEG^Negative    pH Urine 5.5 5.0 - 7.0 pH    Protein Albumin Urine 30 (A) NEG^Negative mg/dL    Urobilinogen Urine 0.2 0.2 - 1.0 EU/dL    Nitrite Urine Negative NEG^Negative    Leukocyte Esterase Urine Moderate (A) NEG^Negative    Source Midstream Urine    Urine Microscopic   Result Value Ref Range    WBC Urine  (A) OTO5^0 - 5 /HPF    RBC Urine O - 2 OTO2^O - 2 /HPF    Squamous Epithelial /LPF Urine Many (A) FEW^Few /LPF    Bacteria Urine Many (A) NEG^Negative /HPF   Pap imaged thin layer screen reflex to HPV if ASCUS - recommend age 25 - 29   Result Value Ref Range    PAP NIL     Copath Report         Patient Name: TIA POWERS  MR#: [id number]  Specimen #: [id number]  Collected: 2/25/2019  Received: 2/26/2019  Reported: 2/27/2019 15:25  Ordering Phy(s): SHAHEED SMITH    For improved result formatting, select 'View Enhanced Report Format' under   Linked Documents section.    SPECIMEN/STAIN PROCESS:  Pap imaged thin layer prep screening (Surepath, FocalPoint with guided   screening)       Pap-Cyto x 1, Pap with reflex to HPV if ASCUS x 1    SOURCE: Cervical, endocervical  ----------------------------------------------------------------   Pap imaged thin layer prep screening (Surepath, FocalPoint with guided   screening)  SPECIMEN ADEQUACY:  Satisfactory for evaluation.  -Transformation zone component absent.    CYTOLOGIC INTERPRETATION:    Negative for intraepithelial lesion or malignancy    Electronically signed out by:  PENNY Vasquez (ASCP)    Processed and screened at [facility],   [facility]    CLINICAL HISTORY:  LMP: 2/14/20 19  A previous normal pap  Date of Last Pap:  "8/21/2015,    Papanicolaou Test Limitations:  Cervical cytology is a screening test with   limited sensitivity; regular  screening is critical for cancer prevention; Pap tests are primarily   effective for the diagnosis/prevention of  squamous cell carcinoma, not adenocarcinomas or other cancers.    TESTING LAB LOCATION:  [address]  [phone]    COLLECTION SITE:  Client:  [facility]  Location: [facility] (B)     Wet prep   Result Value Ref Range    Specimen Description Vagina     Wet Prep Clue cells seen (A)     Wet Prep Yeast seen (A)     Wet Prep No Trichomonas seen    Urine Culture Aerobic Bacterial   Result Value Ref Range    Specimen Description Midstream Urine     Culture Micro       50,000 to 100,000 colonies/mL  mixed urogenital kirsty     NEISSERIA GONORRHOEA PCR   Result Value Ref Range    Specimen Descrip Cervix     N Gonorrhea PCR Negative NEG^Negative   CHLAMYDIA TRACHOMATIS PCR   Result Value Ref Range    Specimen Description Cervix     Chlamydia Trachomatis PCR Negative NEG^Negative       ASSESSMENT/PLAN:         BMI:   Estimated body mass index is 22.37 kg/m  as calculated from the following:    Height as of this encounter: 1.651 m (5' 5\").    Weight as of this encounter: 61 kg (134 lb 6.4 oz).         1. Dysuria    - Wet prep  - UA reflex to Microscopic and Culture  - Urine Microscopic    2. Screening for malignant neoplasm of cervix    - Pap imaged thin layer screen reflex to HPV if ASCUS - recommend age 25 - 29    3. BV (bacterial vaginosis)  Bacterial Vaginosis     Medication started today, see epic for orders.  Patient is to avoid alcohol while on Flagyl.  We briefly discussed the pathophysiology of bacterial vaginosis and outlined the expected course.  I discussed the warning symptoms and signs that indicate an atypical course that would need urgent follow up.  Patient education materials " given during examination today.    - metroNIDAZOLE (FLAGYL) 500 MG tablet; Take 1 tablet (500 mg) by mouth 2 times daily for 7 days  Dispense: 14 tablet; Refill: 0    4. Candidiasis of vagina    Medication ordered, see epic.  We briefly discussed the pathophysiology of yeast infections and outlined the expected course.  I discussed the warning symptoms and signs that indicate an atypical course that would need urgent follow up.  Patient education materials given during examination today, see patient instructions.  We discussed ways to prevent yeast infections in the future.         - fluconazole (DIFLUCAN) 150 MG tablet; Take 1 tablet (150 mg) by mouth once for 1 dose  Dispense: 1 tablet; Refill: 0    5. Screen for STD (sexually transmitted disease)  Safer sex practices reveiwed.  - NEISSERIA GONORRHOEA PCR  - CHLAMYDIA TRACHOMATIS PCR    6. Nonspecific finding on examination of urine    - Urine Culture Aerobic Bacterial    See Patient Instructions    MINH Laureano [facility]

## 2019-02-26 LAB
BACTERIA SPEC CULT: NORMAL
C TRACH DNA SPEC QL NAA+PROBE: NEGATIVE
N GONORRHOEA DNA SPEC QL NAA+PROBE: NEGATIVE
SPECIMEN SOURCE: NORMAL

## 2019-02-27 LAB
COPATH REPORT: NORMAL
PAP: NORMAL

## 2019-03-08 ASSESSMENT — ASTHMA QUESTIONNAIRES: ACT_TOTALSCORE: 24

## 2019-04-16 ENCOUNTER — OFFICE VISIT (OUTPATIENT)
Dept: FAMILY MEDICINE | Facility: CLINIC | Age: 29
End: 2019-04-16
Payer: COMMERCIAL

## 2019-04-16 VITALS
BODY MASS INDEX: 21.92 KG/M2 | WEIGHT: 131.6 LBS | OXYGEN SATURATION: 97 % | HEART RATE: 93 BPM | TEMPERATURE: 98.3 F | SYSTOLIC BLOOD PRESSURE: 122 MMHG | DIASTOLIC BLOOD PRESSURE: 82 MMHG | RESPIRATION RATE: 16 BRPM | HEIGHT: 65 IN

## 2019-04-16 DIAGNOSIS — M62.9 MUSCULOSKELETAL DISORDER INVOLVING UPPER TRAPEZIUS MUSCLE: Primary | ICD-10-CM

## 2019-04-16 PROCEDURE — 99213 OFFICE O/P EST LOW 20 MIN: CPT | Performed by: PHYSICIAN ASSISTANT

## 2019-04-16 RX ORDER — DICLOFENAC SODIUM 75 MG/1
75 TABLET, DELAYED RELEASE ORAL 2 TIMES DAILY PRN
Qty: 30 TABLET | Refills: 1 | Status: SHIPPED | OUTPATIENT
Start: 2019-04-16 | End: 2020-03-13

## 2019-04-16 RX ORDER — METHOCARBAMOL 750 MG/1
750 TABLET, FILM COATED ORAL 3 TIMES DAILY PRN
Qty: 30 TABLET | Refills: 1 | Status: SHIPPED | OUTPATIENT
Start: 2019-04-16 | End: 2020-03-13

## 2019-04-16 ASSESSMENT — MIFFLIN-ST. JEOR: SCORE: 1332.81

## 2019-04-16 ASSESSMENT — PAIN SCALES - GENERAL: PAINLEVEL: SEVERE PAIN (7)

## 2019-04-16 NOTE — LETTER
My Asthma Action Plan  Name: Shwetha Issa   YOB: 1990  Date: 4/16/2019   My doctor: Cary Anthony PA-C   My clinic: [facility]        My Control Medicine: None  My Rescue Medicine: Albuterol (Proair/Ventolin/Proventil) inhaler 2 puffs q 4-6 hrs prn    My Asthma Severity: intermittent  Avoid your asthma triggers: upper respiratory infections               GREEN ZONE   Good Control    I feel good    No cough or wheeze    Can work, sleep and play without asthma symptoms       Take your asthma control medicine every day.     1. If exercise triggers your asthma, take your rescue medication    15 minutes before exercise or sports, and    During exercise if you have asthma symptoms  2. Spacer to use with inhaler: If you have a spacer, make sure to use it with your inhaler             YELLOW ZONE Getting Worse  I have ANY of these:    I do not feel good    Cough or wheeze    Chest feels tight    Wake up at night   1. Keep taking your Green Zone medications  2. Start taking your rescue medicine:    every 20 minutes for up to 1 hour. Then every 4 hours for 24-48 hours.  3. If you stay in the Yellow Zone for more than 12-24 hours, contact your doctor.  4. If you do not return to the Green Zone in 12-24 hours or you get worse, start taking your oral steroid medicine if prescribed by your provider.           RED ZONE Medical Alert - Get Help  I have ANY of these:    I feel awful    Medicine is not helping    Breathing getting harder    Trouble walking or talking    Nose opens wide to breathe       1. Take your rescue medicine NOW  2. If your provider has prescribed an oral steroid medicine, start taking it NOW  3. Call your doctor NOW  4. If you are still in the Red Zone after 20 minutes and you have not reached your doctor:    Take your rescue medicine again and    Call 911 or go to the emergency room right away    See your regular doctor within 2 weeks of an Emergency Room  or Urgent Care visit for follow-up treatment.          Annual Reminders:  Meet with Asthma Educator,  Flu Shot in the Fall, consider Pneumonia Vaccination for patients with asthma (aged 19 and older).    Pharmacy:    [id number] [place] [facility] [address] [facility]  [id number] [place], [place] [facility] [address] [facility]                      Asthma Triggers  How To Control Things That Make Your Asthma Worse    Triggers are things that make your asthma worse.  Look at the list below to help you find your triggers and what you can do about them.  You can help prevent asthma flare-ups by staying away from your triggers.      Trigger                                                          What you can do   Cigarette Smoke  Tobacco smoke can make asthma worse. Do not allow smoking in your home, car or around you.  Be sure no one smokes at a child s day care or school.  If you smoke, ask your health care provider for ways to help you quit.  Ask family members to quit too.  Ask your health care provider for a referral to Quit Plan to help you quit smoking, or call 9-273-534-PLAN.     Colds, Flu, Bronchitis  These are common triggers of asthma. Wash your hands often.  Don t touch your eyes, nose or mouth.  Get a flu shot every year.     Dust Mites  These are tiny bugs that live in cloth or carpet. They are too small to see. Wash sheets and blankets in hot water every week.   Encase pillows and mattress in dust mite proof covers.  Avoid having carpet if you can. If you have carpet, vacuum weekly.   Use a dust mask and HEPA vacuum.   Pollen and Outdoor Mold  Some people are allergic to trees, grass, or weed pollen, or molds. Try to keep your windows closed.  Limit time out doors when pollen count is high.   Ask you health care provider about taking medicine during allergy season.     Animal Dander  Some people are allergic to skin flakes, urine or saliva from pets with fur or feathers. Keep pets with fur or feathers  out of your home.    If you can t keep the pet outdoors, then keep the pet out of your bedroom.  Keep the bedroom door closed.  Keep pets off cloth furniture and away from stuffed toys.     Mice, Rats, and Cockroaches  Some people are allergic to the waste from these pests.   Cover food and garbage.  Clean up spills and food crumbs.  Store grease in the refrigerator.   Keep food out of the bedroom.   Indoor Mold  This can be a trigger if your home has high moisture. Fix leaking faucets, pipes, or other sources of water.   Clean moldy surfaces.  Dehumidify basement if it is damp and smelly.   Smoke, Strong Odors, and Sprays  These can reduce air quality. Stay away from strong odors and sprays, such as perfume, powder, hair spray, paints, smoke incense, paint, cleaning products, candles and new carpet.   Exercise or Sports  Some people with asthma have this trigger. Be active!  Ask your doctor about taking medicine before sports or exercise to prevent symptoms.    Warm up for 5-10 minutes before and after sports or exercise.     Other Triggers of Asthma  Cold air:  Cover your nose and mouth with a scarf.  Sometimes laughing or crying can be a trigger.  Some medicines and food can trigger asthma.

## 2019-04-16 NOTE — PROGRESS NOTES
SUBJECTIVE:   Shwetha Issa is a 28 year old female who presents to clinic today for the following   health issues:    Neck Pain  Onset: 4-5 days     Description:   Location: Left side of the neck   Radiation: to the left shoulder    Intensity: moderate    Progression of Symptoms:  worsening    Accompanying Signs & Symptoms:  Burning, prickly sensation (paresthesias) in arm(s): no   Numbness in arm(s): no   Weakness in arm(s):  no   Fever: no   Headache: YES- Yesterday   Nausea and/or vomiting: no     History:   Trauma: YES- Pinched something to the right side   Previous neck pain: no   Previous surgery or injections: no   Previous Imaging (MRI,X ray): no     Precipitating factors:   Does movement increase the pain:  YES    Therapies Tried and outcome:  Icy hot and ibuprofen- only acute relief          Additional history: as documented    Reviewed  and updated as needed this visit by clinical staff  Tobacco  Allergies  Meds  Problems  Med Hx  Surg Hx  Fam Hx  Soc Hx          Reviewed and updated as needed this visit by Provider  Tobacco  Allergies  Meds  Problems  Med Hx  Surg Hx  Fam Hx         Patient Active Problem List   Diagnosis     CARDIOVASCULAR SCREENING; LDL GOAL LESS THAN 160     Intermittent asthma     Sickle cell trait (H)     Past Surgical History:   Procedure Laterality Date     HC COLP CERVIX/UPPER VAGINA      neg       Social History     Tobacco Use     Smoking status: Never Smoker     Smokeless tobacco: Never Used   Substance Use Topics     Alcohol use: Yes     Alcohol/week: 0.0 oz     Family History   Problem Relation Age of Onset     Cardiovascular Other         Hypertrophic cardiomyopathy in cousin,  in .  Shwetha had normal Echo in .     Thrombophilia Other         sickle cell     Diabetes Other      Hypertension Other      Gastrointestinal Disease Other         peptic ulcers     Blood Disease Maternal Grandfather         sickle cell disease     Blood Disease  "Mother         sickle cell trait     Cerebrovascular Disease Maternal Grandmother      Cerebrovascular Disease Paternal Grandmother          Current Outpatient Medications   Medication Sig Dispense Refill     diclofenac (VOLTAREN) 75 MG EC tablet Take 1 tablet (75 mg) by mouth 2 times daily as needed for moderate pain 30 tablet 1     methocarbamol (ROBAXIN) 750 MG tablet Take 1 tablet (750 mg) by mouth 3 times daily as needed for muscle spasms 30 tablet 1     albuterol (PROAIR HFA/PROVENTIL HFA/VENTOLIN HFA) 108 (90 BASE) MCG/ACT Inhaler Inhale 2 puffs into the lungs every 4 hours as needed for asthma symptoms. (Patient not taking: Reported on 4/16/2019) 1 Inhaler 0     No Known Allergies    ROS:  Constitutional, HEENT, cardiovascular, pulmonary, gi and gu systems are negative, except as otherwise noted.    OBJECTIVE:     /82 (BP Location: Right arm, Patient Position: Sitting, Cuff Size: Adult Large)   Pulse 93   Temp 98.3  F (36.8  C) (Oral)   Resp 16   Ht 1.651 m (5' 5\")   Wt 59.7 kg (131 lb 9.6 oz)   LMP 04/07/2019   SpO2 97%   Breastfeeding? No   BMI 21.90 kg/m    Body mass index is 21.9 kg/m .  GENERAL: healthy, alert and no distress  MS: neck exam shows normal strength,  torticollis and ROM is limited, tenderness and a muscle knot is present of the left  upper trapezius muscle    Diagnostic Test Results:  none     ASSESSMENT/PLAN:       ICD-10-CM    1. Musculoskeletal disorder involving upper trapezius muscle M53.82 methocarbamol (ROBAXIN) 750 MG tablet     diclofenac (VOLTAREN) 75 MG EC tablet       Robaxin 750 mg three times a day as needed for muscle tightness  Diclofenac 75 mg twice a day as needed for pain   Apply heating pad  Massages  Follow up in 1 week as needed     Cary Anthony PA-C  [facility]      "

## 2019-04-16 NOTE — PATIENT INSTRUCTIONS
Robaxin 750 mg three times a day as needed for muscle tightness  Diclofenac 75 mg twice a day as needed for pain   Apply heating pad  massages  Patient Education     Neck Sprain or Strain  A sudden force that causes turning or bending of the neck can cause sprain or strain. An example would be the force from a car accident. This can stretch or tear muscles called a strain. It can also stretch or tear ligaments called a sprain. Either of these can cause neck pain. Sometimes neck pain occurs after a simple awkward movement. In either case, muscle spasm is commonly present and contributes to the pain.   Unless you had a forceful physical injury (for example, a car accident or fall), X-rays are often not ordered for the initial evaluation of neck pain. If pain continues and does not respond to medical treatment, X-rays and other tests may be done later.  Home care    You may feel more soreness and spasm the first few days after the injury. Rest until symptoms start to improve.    When lying down, use a comfortable pillow or a rolled towel that supports the head and keeps the spine in a neutral position. The position of the head should not be tilted forward or backward.    Apply an ice pack over the injured area for 15 to 20 minutes every 3 to 6 hours. Do this for the first 24 to 48 hours. You can make an ice pack by filling a plastic bag that seals at the top with ice cubes and then wrapping it with a thin towel. After 48 hours, apply heat (warm shower or warm bath) for 15 to 20 minutes several times a day, or alternate ice and heat.    You may use over-the-counter pain medicine to control pain, unless another pain medicine was prescribed. If you have chronic liver or kidney disease or ever had a stomach ulcer or gastrointestinal bleeding, talk with your healthcare provider before using these medicines.    If a soft cervical collar was prescribed, only ear it for periods of increased pain. It should not be worn for more  than 3 hours a day, or for longer than 1 to 2 weeks.  Follow-up care  Follow up with your healthcare provider, or as directed. Physical therapy may be needed.  Sometimes fractures don t show up on the first X-ray. Bruises and sprains can sometimes hurt as much as a fracture. These injuries can take time to heal completely. If your symptoms don t improve or they get worse, talk with your healthcare provider. You may need a repeat X-ray or other tests. If X-rays were taken, you will be told of any new findings that may affect your care.  Call 911  Call 911 if you have:    Neck swelling, difficulty or painful swallowing    Trouble breathing    Chest pain   When to seek medical advice  Call your healthcare provider right away if any of these occur:    Pain becomes worse or spreads into your arms or legs    Weakness or numbness in one or both arms or legs  Date Last Reviewed: 5/1/2018 2000-2018 [employer]. [address] [place] [address], [place] [address]. All rights reserved. This information is not intended as a substitute for professional medical care. Always follow your healthcare professional's instructions.

## 2019-09-03 ENCOUNTER — TELEPHONE (OUTPATIENT)
Dept: FAMILY MEDICINE | Facility: CLINIC | Age: 29
End: 2019-09-03

## 2019-09-03 NOTE — TELEPHONE ENCOUNTER
Spoke to patient and she had positive home pregnancy test yesterday and has following concerns.    Porsha Titus RN        Patient took a pregnancy test(2) yesterday 09-02  Has been using boric acid suppository/Queen V/product line reviews were good - used four times within a two week span // this was for a yeast - bacterial infection, now concerned as this may cause a miscarriage/has not been confirmed by a Dr.   *had tried a different one as well called [facility]

## 2019-09-03 NOTE — TELEPHONE ENCOUNTER
.Reason for Call:  concerns    Detailed comments: Patient took a pregnancy test(2) yesterday 09-02  Has been using boric acid suppository/Queen V/product line reviews were good - used four times within a two week span // this was for a yeast - bacterial infection, now concerned as this may cause a miscarriage/has not been confirmed by a Dr.   *had tried a different one as well called [facility]      Phone Number Patient can be reached at: Cell number on file:    Telephone Information:   Mobile [phone]       Best Time: anytime    Can we leave a detailed message on this number? YES    Call taken on 9/3/2019 at 9:06 AM by Christel Rodriguez

## 2019-09-03 NOTE — TELEPHONE ENCOUNTER
Please call the patient and ask her if she had a positive or negative pregnancy test as It does not indicate in the message below.  Then route to provider.    Thank you,  Porsha Titus RN

## 2019-09-04 ENCOUNTER — OFFICE VISIT (OUTPATIENT)
Dept: FAMILY MEDICINE | Facility: CLINIC | Age: 29
End: 2019-09-04
Payer: MEDICAID

## 2019-09-04 VITALS
OXYGEN SATURATION: 100 % | HEART RATE: 70 BPM | HEIGHT: 65 IN | SYSTOLIC BLOOD PRESSURE: 126 MMHG | BODY MASS INDEX: 21.16 KG/M2 | TEMPERATURE: 98.4 F | RESPIRATION RATE: 16 BRPM | WEIGHT: 127 LBS | DIASTOLIC BLOOD PRESSURE: 86 MMHG

## 2019-09-04 DIAGNOSIS — D57.3 SICKLE CELL TRAIT (H): ICD-10-CM

## 2019-09-04 DIAGNOSIS — N89.8 VAGINAL DISCHARGE: ICD-10-CM

## 2019-09-04 DIAGNOSIS — N76.0 BACTERIAL VAGINOSIS: ICD-10-CM

## 2019-09-04 DIAGNOSIS — B96.89 BACTERIAL VAGINOSIS: ICD-10-CM

## 2019-09-04 DIAGNOSIS — N91.2 AMENORRHEA: Primary | ICD-10-CM

## 2019-09-04 LAB
B-HCG SERPL-ACNC: 889 IU/L (ref 0–5)
SPECIMEN SOURCE: NORMAL
WET PREP SPEC: NORMAL

## 2019-09-04 PROCEDURE — 87210 SMEAR WET MOUNT SALINE/INK: CPT | Performed by: NURSE PRACTITIONER

## 2019-09-04 PROCEDURE — 36415 COLL VENOUS BLD VENIPUNCTURE: CPT | Performed by: NURSE PRACTITIONER

## 2019-09-04 PROCEDURE — 84702 CHORIONIC GONADOTROPIN TEST: CPT | Performed by: NURSE PRACTITIONER

## 2019-09-04 PROCEDURE — 99214 OFFICE O/P EST MOD 30 MIN: CPT | Performed by: NURSE PRACTITIONER

## 2019-09-04 ASSESSMENT — MIFFLIN-ST. JEOR: SCORE: 1311.95

## 2019-09-04 NOTE — PATIENT INSTRUCTIONS
At [facility], we strive to deliver an exceptional experience to you, every time we see you.  If you receive a survey in the mail, please send us back your thoughts. We really do value your feedback.    Your care team:     Family Medicine   TERESA Cardoza MD Emily Bunt, APRN CNP S. Matthew Hockett, MD Pamela Kolacz, MD Angela Wermerskirchen, MD         Clinic hours: Monday - Wednesday 7 am-7 pm   Thursdays and Fridays 7 am-5 pm.     [facility]: Monday - Friday 11 am-9 pm,   Saturday and Sunday 9 am-5 pm.    [facility]: Monday -Thursday 8 am-8 pm; Friday 8 am-6 pm; Saturday and Sunday 9 am-5 pm.     [facility]: Monday - Thursday 8 am - 7 pm; Friday 8 am - 6 pm    Clinic: [phone]   [facility]: [phone]   [facility]: [phone]

## 2019-09-04 NOTE — PROGRESS NOTES
Subjective     Shwetha Issa is a 28 year old female who presents to clinic today for the following health issues:    HPI   Patient voices that she had two pregnancy tests 19 and was positive. She wanted to confirm pregnancy. Last menses 19.  Has twin boys at home. Having some breast tenderness, has some slight moments of nausea, very tired. Also very hungry.  Advised her to stop all alcohol and marijuana use if she is pregnant.  She states she is nervous as she has had a miscarriage in the past.  Discussed that she is slightly at increased risk of having one but certainly by staying healthy it can reduce her risk.  We will do the blood hCG test to know the level of her hCG.     occasional alcohol. Last was 3 weeks. -she states she is no longer using since finding out she is pregnant  Smokes marijuana occasional: last time was 3-4 days ago-she agrees to not smoke anymore            Vaginal Symptoms      Description: This morning she noticed discharge and smell - but 3 weeks now   Vaginal Discharge: white   Itching (Pruritis): no   Burning sensation:  no   Odor: YES    Accompanying Signs & Symptoms:  Pain with Urination: no   Abdominal Pain: no   Fever: no     Therapies Tried and outcome: Suppository-she did try boric acid suppository, a couple days in a row.  When she found out she had a positive pregnancy test she stopped taking it.             Patient Active Problem List   Diagnosis     CARDIOVASCULAR SCREENING; LDL GOAL LESS THAN 160     Intermittent asthma     Sickle cell trait (H)     Past Surgical History:   Procedure Laterality Date     HC COLP CERVIX/UPPER VAGINA      neg       Social History     Tobacco Use     Smoking status: Never Smoker     Smokeless tobacco: Never Used   Substance Use Topics     Alcohol use: Yes     Alcohol/week: 0.0 oz     Family History   Problem Relation Age of Onset     Cardiovascular Other         Hypertrophic cardiomyopathy in cousin,  in .  Shwetha had  "normal Echo in 2008.     Thrombophilia Other         sickle cell     Diabetes Other      Hypertension Other      Gastrointestinal Disease Other         peptic ulcers     Blood Disease Maternal Grandfather         sickle cell disease     Blood Disease Mother         sickle cell trait     Cerebrovascular Disease Maternal Grandmother      Cerebrovascular Disease Paternal Grandmother          Current Outpatient Medications   Medication Sig Dispense Refill     albuterol (PROAIR HFA/PROVENTIL HFA/VENTOLIN HFA) 108 (90 BASE) MCG/ACT Inhaler Inhale 2 puffs into the lungs every 4 hours as needed for asthma symptoms. (Patient not taking: Reported on 4/16/2019) 1 Inhaler 0     diclofenac (VOLTAREN) 75 MG EC tablet Take 1 tablet (75 mg) by mouth 2 times daily as needed for moderate pain (Patient not taking: Reported on 9/4/2019) 30 tablet 1     methocarbamol (ROBAXIN) 750 MG tablet Take 1 tablet (750 mg) by mouth 3 times daily as needed for muscle spasms (Patient not taking: Reported on 9/4/2019) 30 tablet 1     No Known Allergies    Reviewed and updated as needed this visit by Provider  Tobacco  Allergies  Meds  Problems  Med Hx  Surg Hx  Fam Hx         Review of Systems   ROS COMP: Constitutional, cardiovascular, pulmonary, gi and gu-as above systems are negative, except as otherwise noted.      Objective    /86 (BP Location: Right arm, Patient Position: Sitting, Cuff Size: Adult Regular)   Pulse 70   Temp 98.4  F (36.9  C) (Oral)   Resp 16   Ht 1.651 m (5' 5\")   Wt 57.6 kg (127 lb)   LMP 08/04/2019   SpO2 100%   BMI 21.13 kg/m    Body mass index is 21.13 kg/m .  Physical Exam   GENERAL: healthy, alert and no distress  RESP: lungs clear to auscultation - no rales, rhonchi or wheezes  CV: regular rate and rhythm, normal S1 S2, no S3 or S4, no murmur, click or rub   (female): normal female external genitalia, normal urethral meatus, vaginal mucosa, normal cervix some white discharge noted, no odor  MS: no " gross musculoskeletal defects noted, no edema    Diagnostic Test Results:  Labs reviewed in Epic  No results found for this or any previous visit (from the past 24 hour(s)).        Assessment & Plan     1. Amenorrhea  Check blood test for pregnancy  - HCG quantitative pregnancy    2. Vaginal discharge  Wet prep is pending  - Wet prep    3. Sickle cell trait (H)  This history of the sickle cell trait but does not have a disease.  Her grandfather had the disease and other family members have the trait.    4. Bacterial vaginosis:  Noted 1 day later when patient was still having symptoms and came in for self-collect wet prep. Sent clindamycin 300 mg BID x 7 days for treatment     Return in about 1 week (around 9/11/2019), or if symptoms worsen or fail to improve.    MINH Sharp, NP-C  [facility]    This chart was documented by provider using a voice activated software called Dragon in addition to manual typing. There may be vocabulary errors or other grammatical errors due to this.

## 2019-09-05 ENCOUNTER — TELEPHONE (OUTPATIENT)
Dept: FAMILY MEDICINE | Facility: CLINIC | Age: 29
End: 2019-09-05

## 2019-09-05 DIAGNOSIS — N89.8 VAGINAL DISCHARGE: ICD-10-CM

## 2019-09-05 DIAGNOSIS — N89.8 VAGINAL DISCHARGE: Primary | ICD-10-CM

## 2019-09-05 LAB
SPECIMEN SOURCE: ABNORMAL
WET PREP SPEC: ABNORMAL

## 2019-09-05 PROCEDURE — 87210 SMEAR WET MOUNT SALINE/INK: CPT | Performed by: NURSE PRACTITIONER

## 2019-09-05 ASSESSMENT — ASTHMA QUESTIONNAIRES: ACT_TOTALSCORE: 25

## 2019-09-05 NOTE — TELEPHONE ENCOUNTER
Reason for Call:  Other call back    Detailed comments: Shwetha was seen 09/04 and her labs were negative for bacterial infection. She has had this in the past and all the symptoms are there and would like to come in and be tested again as a precautionary measure since she is pregnant. Can you put orders in and give her a call so she can make an appointment. Thank you     Phone Number Patient can be reached at: Home number on file [phone] (home)    Best Time: Any    Can we leave a detailed message on this number? YES    Call taken on 9/5/2019 at 9:39 AM by Jaqui Bear        Bacterial infection

## 2019-09-05 NOTE — TELEPHONE ENCOUNTER
Patient can make a lab only appointment and do a self collect wet prep. Order is in.   MINH Sharp, NP-C  [facility]

## 2019-09-06 DIAGNOSIS — N76.0 BACTERIAL VAGINOSIS: Primary | ICD-10-CM

## 2019-09-06 DIAGNOSIS — B96.89 BACTERIAL VAGINOSIS: Primary | ICD-10-CM

## 2019-09-06 RX ORDER — CLINDAMYCIN HCL 300 MG
300 CAPSULE ORAL 2 TIMES DAILY
Qty: 14 CAPSULE | Refills: 0 | Status: SHIPPED | OUTPATIENT
Start: 2019-09-06 | End: 2019-09-13

## 2019-09-07 ENCOUNTER — NURSE TRIAGE (OUTPATIENT)
Dept: NURSING | Facility: CLINIC | Age: 29
End: 2019-09-07

## 2019-09-07 NOTE — TELEPHONE ENCOUNTER
"C/o \"cramping\" pain under R rib for 15 min. States she is having some breathing difficulty but not severe. Says pain feels heavy. 5 wks pregnant. Advised 911.     Reason for Disposition    [1] Chest pain lasts > 5 minutes AND [2] described as crushing, pressure-like, or heavy    Additional Information    Negative: Severe difficulty breathing (e.g., struggling for each breath, speaks in single words)    Negative: Difficult to awaken or acting confused (e.g., disoriented, slurred speech)    Negative: Shock suspected (e.g., cold/pale/clammy skin, too weak to stand, low BP, rapid pulse)    Negative: [1] Chest pain lasts > 5 minutes AND [2] history of heart disease  (i.e., heart attack, bypass surgery, angina, angioplasty, CHF; not just a heart murmur)    Protocols used: CHEST PAIN-A-AH      "

## 2019-09-09 ENCOUNTER — TELEPHONE (OUTPATIENT)
Dept: FAMILY MEDICINE | Facility: CLINIC | Age: 29
End: 2019-09-09

## 2019-09-09 ENCOUNTER — OFFICE VISIT (OUTPATIENT)
Dept: FAMILY MEDICINE | Facility: CLINIC | Age: 29
End: 2019-09-09
Payer: MEDICAID

## 2019-09-09 VITALS
DIASTOLIC BLOOD PRESSURE: 88 MMHG | SYSTOLIC BLOOD PRESSURE: 126 MMHG | WEIGHT: 127 LBS | HEIGHT: 65 IN | OXYGEN SATURATION: 100 % | HEART RATE: 96 BPM | TEMPERATURE: 97.5 F | BODY MASS INDEX: 21.16 KG/M2

## 2019-09-09 DIAGNOSIS — O26.891 ABDOMINAL PAIN DURING PREGNANCY IN FIRST TRIMESTER: Primary | ICD-10-CM

## 2019-09-09 DIAGNOSIS — R10.9 ABDOMINAL PAIN DURING PREGNANCY IN FIRST TRIMESTER: Primary | ICD-10-CM

## 2019-09-09 LAB — B-HCG SERPL-ACNC: 5393 IU/L (ref 0–5)

## 2019-09-09 PROCEDURE — 99213 OFFICE O/P EST LOW 20 MIN: CPT | Performed by: FAMILY MEDICINE

## 2019-09-09 PROCEDURE — 36415 COLL VENOUS BLD VENIPUNCTURE: CPT | Performed by: FAMILY MEDICINE

## 2019-09-09 PROCEDURE — 84702 CHORIONIC GONADOTROPIN TEST: CPT | Performed by: FAMILY MEDICINE

## 2019-09-09 ASSESSMENT — MIFFLIN-ST. JEOR: SCORE: 1311.95

## 2019-09-09 ASSESSMENT — PAIN SCALES - GENERAL: PAINLEVEL: NO PAIN (0)

## 2019-09-09 NOTE — TELEPHONE ENCOUNTER
Writer contacted patient with test results and told patient to schedule a prenatal visit in about 2 weeks.      Analilia Trinh, CMA

## 2019-09-09 NOTE — PROGRESS NOTES
"Subjective     Shwetha Issa is a 28 year old female who presents to clinic today for the following health issues:    HPI     ED/UC Followup:    Facility:  [facility]  Date of visit: 9/7/2019  Reason for visit: Abdominal Pain during first trimester  Current Status: Abdominal pain resolved, feels ok but feels weakness in pelvic area           Reviewed and updated as needed this visit by Provider  Tobacco  Allergies  Meds  Problems  Med Hx  Surg Hx  Fam Hx         Review of Systems   ROS COMP: Constitutional, HEENT, cardiovascular, pulmonary, gi and gu systems are negative, except as otherwise noted.      Objective    /88 (BP Location: Left arm)   Pulse 96   Temp 97.5  F (36.4  C) (Tympanic)   Ht 1.651 m (5' 5\")   Wt 57.6 kg (127 lb)   LMP 08/04/2019 (Exact Date)   SpO2 100%   Breastfeeding? No   BMI 21.13 kg/m    Body mass index is 21.13 kg/m .  Physical Exam   GENERAL: healthy, alert and no distress  NECK: no adenopathy, no asymmetry, masses, or scars and thyroid normal to palpation  RESP: lungs clear to auscultation - no rales, rhonchi or wheezes  CV: regular rate and rhythm, normal S1 S2, no S3 or S4, no murmur, click or rub, no peripheral edema and peripheral pulses strong  ABDOMEN: soft, nontender, no hepatosplenomegaly, no masses and bowel sounds normal  MS: no gross musculoskeletal defects noted, no edema    Diagnostic Test Results:  Labs reviewed in Epic        Assessment & Plan     1. Abdominal pain during pregnancy in first trimester  Recheck HCG since previous level was over 4800.  Recommend prenatal visit at 8 week unless labs are not reassuring.  - HCG Quantitative Pregnancy, Blood ([id number])       Return in about 3 days (around 9/12/2019), or if symptoms worsen or fail to improve.    Kenya Mota MD  [facility]      "

## 2019-09-09 NOTE — RESULT ENCOUNTER NOTE
Please call patient.    It does appear her HCG level is going up as is appropriate.  I recommend she schedule the prenatal visit for 7 - 8 weeks which should be in about 2 weeks.    Kenya Browne M.D.

## 2019-09-24 ENCOUNTER — TELEPHONE (OUTPATIENT)
Dept: OBGYN | Facility: CLINIC | Age: 29
End: 2019-09-24

## 2019-09-24 ENCOUNTER — OFFICE VISIT (OUTPATIENT)
Dept: OBGYN | Facility: CLINIC | Age: 29
End: 2019-09-24
Payer: MEDICAID

## 2019-09-24 ENCOUNTER — PREP FOR PROCEDURE (OUTPATIENT)
Dept: OBGYN | Facility: CLINIC | Age: 29
End: 2019-09-24

## 2019-09-24 VITALS
WEIGHT: 127 LBS | DIASTOLIC BLOOD PRESSURE: 80 MMHG | BODY MASS INDEX: 21.13 KG/M2 | SYSTOLIC BLOOD PRESSURE: 135 MMHG | HEART RATE: 91 BPM

## 2019-09-24 DIAGNOSIS — Z34.80 SUPERVISION OF OTHER NORMAL PREGNANCY, ANTEPARTUM: Primary | ICD-10-CM

## 2019-09-24 DIAGNOSIS — O09.899 H/O PRETERM DELIVERY, CURRENTLY PREGNANT: ICD-10-CM

## 2019-09-24 DIAGNOSIS — D57.3 SICKLE CELL TRAIT (H): ICD-10-CM

## 2019-09-24 LAB
ABO + RH BLD: NORMAL
ABO + RH BLD: NORMAL
BLD GP AB SCN SERPL QL: NORMAL
BLOOD BANK CMNT PATIENT-IMP: NORMAL
ERYTHROCYTE [DISTWIDTH] IN BLOOD BY AUTOMATED COUNT: 12.4 % (ref 10–15)
HBV SURFACE AG SERPL QL IA: NONREACTIVE
HCT VFR BLD AUTO: 37.3 % (ref 35–47)
HGB BLD-MCNC: 13 G/DL (ref 11.7–15.7)
HIV 1+2 AB+HIV1 P24 AG SERPL QL IA: NONREACTIVE
MCH RBC QN AUTO: 30.9 PG (ref 26.5–33)
MCHC RBC AUTO-ENTMCNC: 34.9 G/DL (ref 31.5–36.5)
MCV RBC AUTO: 89 FL (ref 78–100)
PLATELET # BLD AUTO: 243 10E9/L (ref 150–450)
RBC # BLD AUTO: 4.21 10E12/L (ref 3.8–5.2)
RUBV IGG SERPL IA-ACNC: 92 IU/ML
SPECIMEN EXP DATE BLD: NORMAL
T PALLIDUM AB SER QL: NONREACTIVE
WBC # BLD AUTO: 12.7 10E9/L (ref 4–11)

## 2019-09-24 PROCEDURE — 36415 COLL VENOUS BLD VENIPUNCTURE: CPT | Performed by: OBSTETRICS & GYNECOLOGY

## 2019-09-24 PROCEDURE — 86900 BLOOD TYPING SEROLOGIC ABO: CPT | Performed by: OBSTETRICS & GYNECOLOGY

## 2019-09-24 PROCEDURE — 87086 URINE CULTURE/COLONY COUNT: CPT | Performed by: OBSTETRICS & GYNECOLOGY

## 2019-09-24 PROCEDURE — 87491 CHLMYD TRACH DNA AMP PROBE: CPT | Performed by: OBSTETRICS & GYNECOLOGY

## 2019-09-24 PROCEDURE — 86850 RBC ANTIBODY SCREEN: CPT | Performed by: OBSTETRICS & GYNECOLOGY

## 2019-09-24 PROCEDURE — 99213 OFFICE O/P EST LOW 20 MIN: CPT | Performed by: OBSTETRICS & GYNECOLOGY

## 2019-09-24 PROCEDURE — 86901 BLOOD TYPING SEROLOGIC RH(D): CPT | Performed by: OBSTETRICS & GYNECOLOGY

## 2019-09-24 PROCEDURE — 86762 RUBELLA ANTIBODY: CPT | Performed by: OBSTETRICS & GYNECOLOGY

## 2019-09-24 PROCEDURE — 87389 HIV-1 AG W/HIV-1&-2 AB AG IA: CPT | Performed by: OBSTETRICS & GYNECOLOGY

## 2019-09-24 PROCEDURE — 85027 COMPLETE CBC AUTOMATED: CPT | Performed by: OBSTETRICS & GYNECOLOGY

## 2019-09-24 PROCEDURE — 86780 TREPONEMA PALLIDUM: CPT | Performed by: OBSTETRICS & GYNECOLOGY

## 2019-09-24 PROCEDURE — 87591 N.GONORRHOEAE DNA AMP PROB: CPT | Performed by: OBSTETRICS & GYNECOLOGY

## 2019-09-24 PROCEDURE — G0499 HEPB SCREEN HIGH RISK INDIV: HCPCS | Performed by: OBSTETRICS & GYNECOLOGY

## 2019-09-24 NOTE — PROGRESS NOTES
Shwetha is a 29 year old  @  7w2d weeks here for new ob visit.    H/O PROM and postpartum preeclampsia with last twins delivery  See Ob questionnaire for pertinent components of HPI.  Current Issues include: Sab symptoms:  cramping  IMPRESSION:  Single, live 6 week intrauterine gestation. This results in estimated date of delivery of May 10, 2020    REPORT SIGNED BY DR. Paramjit Orta   Result Narrative   DATE: 9/15/2019 6:01 PM    CLINICAL INFORMATION: Pain.    ICD 10:    LMP: 2019  LMP AGE:    TECHNIQUE: Transabdominal and endovaginal images were obtained.    COMPARISON: 2019. The report for that exam described a small fluid collection within the endometrial canal that is not definitely represent a gestational sac    FINDINGS:   An intrauterine gestational sac is identified.  This contains a yolk sac and a fetal pole.   The crown-rump length is 0.29 cm.  This corresponds to a sonographic age of6 weeks.  Fetal heart motion was detected.     FETAL HEART RATE: 104 beats per minute.    The placenta position is not be well assessed at this point in gestation  Amniotic fluid volume is within normal limits.      Complex 2 cm cyst related to the ovaries. On these presumably represents the corpus luteum cyst.    Blood flow is document in the both ovaries    No excessive free pelvic fluid.       OB History    Para Term  AB Living   3 1 0 1 1 2   SAB TAB Ectopic Multiple Live Births   1 0 0 1 2      # Outcome Date GA Lbr Mayank/2nd Weight Sex Delivery Anes PTL Lv   3 Current            2A  17 33w4d 07:00 / 00:20 1.843 kg (4 lb 1 oz) M  EPI Y PARK      Complications:  premature rupture of membranes (PPROM) delivered, current hospitalization, Twin pregnancy, Preeclampsia in postpartum period      Name: Viraj   2B  17 33w4d  1.559 kg (3 lb 7 oz) M Vag-Breech EPI Y PARK      Name: Kaiser   1 SAB 10/24/16 11w6d    SAB         Birth Comments: no D&C      Gyne: Pap smears Normal  Past Medical History:   Diagnosis Date     H/O colposcopy with cervical biopsy 05/10/2013    resolved     History of asthma 2008    exercise induced.  No symptoms since 2008.     LSIL (low grade squamous intraepithelial lesion) on Pap smear 3/19/13    resolved     Sickle cell trait (H)      Trichomonal vulvovaginitis 2012     Past Surgical History:   Procedure Laterality Date     HC COLP CERVIX/UPPER VAGINA  2013    neg     Patient Active Problem List    Diagnosis Date Noted     Sickle cell trait (H) 10/18/2016     Priority: Medium     Intermittent asthma 06/11/2014     Priority: Medium     CARDIOVASCULAR SCREENING; LDL GOAL LESS THAN 160 01/17/2011     Priority: Medium      No Known Allergies  Current Outpatient Medications   Medication Sig Dispense Refill     albuterol (PROAIR HFA/PROVENTIL HFA/VENTOLIN HFA) 108 (90 BASE) MCG/ACT Inhaler Inhale 2 puffs into the lungs every 4 hours as needed for asthma symptoms. (Patient not taking: Reported on 9/24/2019) 1 Inhaler 0     diclofenac (VOLTAREN) 75 MG EC tablet Take 1 tablet (75 mg) by mouth 2 times daily as needed for moderate pain (Patient not taking: Reported on 9/24/2019) 30 tablet 1     methocarbamol (ROBAXIN) 750 MG tablet Take 1 tablet (750 mg) by mouth 3 times daily as needed for muscle spasms (Patient not taking: Reported on 9/24/2019) 30 tablet 1       Past Medical History of Father of Baby:  Different from previos pregnancy.  No significant medical history    Physical Exam: /80   Pulse 91   Wt 57.6 kg (127 lb)   LMP 08/04/2019 (Exact Date)   Breastfeeding? No   BMI 21.13 kg/m    General: Well developed, well nourished female  Skin: Normal  HEENT: Normal  Neck: Supple,no adenopathy,thyroid normal  Chest: Clear  Heart: Regular rate, rhythm and Regular rate, rhythm,No murmur, rub, gallop  Breasts: No masses, skin, nipple or axillary changes   Abdomen: Benign,Soft, flat, non-tender,No masses, organomegaly,No inguinal  nodes,Bowel sounds normoactive   Extremities: Normal  Neurological: Normal   Perineum: Intact   Vulva: Normal  Vagina: Normal mucosa, no discharge  Cervix: Parous, closed, mobile, no discharge  Uterus: 7 weeks, Normal shape, position and consistency   Adnexa: Normal  Rectum: deferred, Normal without lesion or mass   Bony Pelvis: Adequate       A/P 29 year old  at  7w2d weeks    ICD-10-CM    1. Supervision of other normal pregnancy, antepartum Z34.80 ABO/Rh type and screen     Hepatitis B surface antigen     Rubella Antibody IgG Quantitative     CBC with platelets     Urine Culture Aerobic Bacterial     Neisseria gonorrhoeae PCR     Chlamydia trachomatis PCR     HIV Antigen Antibody Combo     Treponema Abs w Reflex to RPR and Titer     PERINATOLOGY REFERRAL   2. Sickle cell trait (H) D57.3 PERINATOLOGY REFERRAL   3. H/O  delivery, currently pregnant O09.219 PERINATOLOGY REFERRAL       - Discussed physician coverage, tertiary support, diet, exercise, weight gain, schedule of visits, routine and indicated ultrasounds, and childbirth education.    - Options for  testing for chromosomal and birth defects were discussed with the patient.  Diagnostic tests include CVS and Amniocentesis.  We discussed that these tests are definitive but invasive and do carry a risk of fetal loss.    Screening tests include nuchal translucency/blood marker testing in the first trimester and quad screening in the second trimester.  We discussed that these are screening tests and not diagnostic tests and that false positives and negatives are a distinct possibility.  The patient wants first trimester testing..    return to clinic in 4 weeks.      CEPHAS AGBEH, MD.

## 2019-09-24 NOTE — PATIENT INSTRUCTIONS
If you have any questions regarding your visit, Please contact your care team.  [facility]: [phone]  [facility] CL[facility] TELEPHONE NUMBER   Cephas Agbeh, M.D. Lisa -       [place]omy [place]         Monday-Job    8:00a.m-4:45 p.m    Tuesday--[place]     8:00a.m-4:45 p.m.    Thursday-Job    8:00a.m-4:45 p.m.    Friday-Job    8:00a.m-4:45 p.m    [facility]   [address] [place] [address]   [phone]-Ask for [facility]   Fax [phone]   [phone]     [facility]   [address] [place] [address] [place] [address]   [phone]    [facility]  [address] [facility] [address]  [phone]  [phone]   Urgent Care locations:    [facility] Monday-Friday  5 pm - 9 pm  Saturday and Sunday   9 am - 5 pm   Monday-Friday   5 pm - 9 pm  Saturday and Sunday  9 am - 5 pm    [phone]   If you need a medication refill, please contact your pharmacy. Please allow 3 business days for your refill to be completed.  As always, Thank you for trusting us with your healthcare needs!

## 2019-09-25 LAB
BACTERIA SPEC CULT: NO GROWTH
C TRACH DNA SPEC QL NAA+PROBE: NEGATIVE
N GONORRHOEA DNA SPEC QL NAA+PROBE: NEGATIVE
SPECIMEN SOURCE: NORMAL

## 2019-10-24 ENCOUNTER — PRENATAL OFFICE VISIT (OUTPATIENT)
Dept: OBGYN | Facility: CLINIC | Age: 29
End: 2019-10-24
Payer: COMMERCIAL

## 2019-10-24 VITALS
SYSTOLIC BLOOD PRESSURE: 137 MMHG | DIASTOLIC BLOOD PRESSURE: 75 MMHG | WEIGHT: 136.3 LBS | BODY MASS INDEX: 22.68 KG/M2 | HEART RATE: 98 BPM

## 2019-10-24 DIAGNOSIS — O21.0 HYPEREMESIS GRAVIDARUM: Primary | ICD-10-CM

## 2019-10-24 PROCEDURE — 99207 ZZC PRENATAL VISIT: CPT | Performed by: OBSTETRICS & GYNECOLOGY

## 2019-10-24 RX ORDER — VITAMIN A ACETATE, .BETA.-CAROTENE, ASCORBIC ACID, CHOLECALCIFEROL, .ALPHA.-TOCOPHEROL ACETATE, DL-, THIAMINE MONONITRATE, RIBOFLAVIN, NIACINAMIDE, PYRIDOXINE HYDROCHLORIDE, FOLIC ACID, CYANOCOBALAMIN, CALCIUM CARBONATE, FERROUS FUMARATE, ZINC OXIDE, AND CUPRIC OXIDE 2000; 2000; 120; 400; 22; 1.84; 3; 20; 10; 1; 12; 200; 27; 25; 2 [IU]/1; [IU]/1; MG/1; [IU]/1; MG/1; MG/1; MG/1; MG/1; MG/1; MG/1; UG/1; MG/1; MG/1; MG/1; MG/1
1 TABLET ORAL DAILY
COMMUNITY
End: 2021-01-10

## 2019-10-24 RX ORDER — ONDANSETRON 4 MG/1
4 TABLET, FILM COATED ORAL EVERY 6 HOURS PRN
Qty: 30 TABLET | Refills: 1 | Status: SHIPPED | OUTPATIENT
Start: 2019-10-24 | End: 2020-02-13

## 2019-10-24 NOTE — PATIENT INSTRUCTIONS
If you have any questions regarding your visit, Please contact your care team.    [employer] [facility]: [phone]      [facility] CLINIC HOURS TELEPHONE NUMBER   Nanda DO. Mandi Joshua CMA Lisa -    RANDOLPH Yeh, RANDOLPH Clancy RN     Monday, Wednesday, [facility]  8:30a.m-5:00 p.m   [facility]  [address] [place] [address]  [phone] ask for [facility]    Imaging [phone]       Urgent Care locations:    [facility] Saturday and    9 am - 5 pm    Monday-Friday   12 pm - 8 pm  Saturday and    9 am - 5 pm   [phone]     [facility] Labor and Delivery:  [phone]    If you need a medication refill, please contact your pharmacy. Please allow 3 business days for your refill to be completed.  As always, Thank you for trusting us with your healthcare needs!       BIRTH AND 17-alpha hydroxyprogesterone caproate (17P) TREATMENT    What is  birth?      birth is the delivery of a baby before 37 weeks of gestation.  Approximately 1 in every 12 babies in [place] is born premature (that s approximately 6,000 babies every year)!     birth is often unexpected, but can happen for many reasons. There are some known risk factors, which include:   -pregnancy with twins or triplets   -being  race  -some problems with the uterus or cervix   -some medical problems like high blood pressure   -smoking, drinking, or using illegal drugs while pregnant   -infections like urinary tract infection, bacterial vaginosis and chlamydia while    pregnant  -depression, stress, and anxiety    But the biggest risk factor is having a previous  baby. In fact, women who have one  birth have a 30-50% chance of their next baby coming early too.     What are the risks to  a baby that delivers prematurely?     birth is the number one cause of  death worldwide. This risk increases the earlier the baby is born.  Prematurity can also cause serious long term health problems for babies such as breathing problems, infections, bleeding into the brain, as well as long term developmental problems like cerebral palsy, learning impairments, hearing and vision problems.    How can I prevent  birth in my pregnancy?  Overall, the best thing to prevent  delivery is to stay healthy during your pregnancy, and follow up with your doctor for your regular visits and screening tests.  If you have a history of  birth in one of your past pregnancies, you may benefit from weekly injections of 17P.     What is 17P?  The full name is 17-alpha hydroxyprogesterone caproate. This is a form of your body s natural  pregnancy hormone , progesterone. The brand name of this is Natalie, and it is FDA approved for prevention of  birth.  This medication is given in once weekly injections from week 16-20 through the 36th week of pregnancy. Research shows that women taking 17P can reduce their risk of  birth from 50% to 36%. The treatment has also been shown to reduce the risk of complications after birth, such as bleeding in the brain and need for supplemental oxygen.    It is important to complete the treatment once you start, as the risk of  birth goes up if you stop the injections early.    How can I get started on the Natalie treatment?  First, you should talk with your doctor to find out if this is a good option for you.  It is safe for pregnant women and for the fetus, but if you have some medical problems like uncontrolled blood pressure, breast cancer, or liver disease you should talk about it with your doctor first.  Your clinic will work with you to help determine insurance coverage and preauthorization if needed.  Then you will schedule weekly visits for 17P  injections in addition to your routine prenatal visits with your doctor.    Side Effects of Mekena  The most common side effects  reported by [facility] users are   injection site reactions (pain [35%], swelling  [17%], pruritus (itching) [6%], nodule [5%]), urticaria (rash) (12%), pruritus (generalized itching (8%), nausea (6%), and diarrhea (2%).

## 2019-10-24 NOTE — PROGRESS NOTES
"She has not felt fetal movement yet but will record when this starts.  She gained 9 lbs since her last visit, yet c/o feeling nauseated daily and has one emesis each day on the average.  She is unsure if she wants to start Zofran but would like a printed script.  She has an appt with MFM per Dr. Agbeh - sree of sickle cell trait and twins with PPROM at 33 4/7 weeks gestation.  She denies any vaginal spotting or regular uterine cramping.  The results of her early US on 9/15/19 at [facility] were reviewed with the patient.  Evaluation for 17P   Is this current pregnancy a villegas pregnancy? Yes  Has this patient experienced a spontaneous,  birth or  rupture of membranes between 20 - 37 weeks of a villegas pregnancy? No    Either answer is \"No\"  the patient is not a candidate for \"17P\". Plan continue with usual care.  "

## 2019-11-08 ENCOUNTER — HEALTH MAINTENANCE LETTER (OUTPATIENT)
Age: 29
End: 2019-11-08

## 2019-11-21 ENCOUNTER — PRENATAL OFFICE VISIT (OUTPATIENT)
Dept: OBGYN | Facility: CLINIC | Age: 29
End: 2019-11-21
Payer: COMMERCIAL

## 2019-11-21 VITALS
HEART RATE: 78 BPM | DIASTOLIC BLOOD PRESSURE: 73 MMHG | SYSTOLIC BLOOD PRESSURE: 122 MMHG | WEIGHT: 144.6 LBS | OXYGEN SATURATION: 99 % | BODY MASS INDEX: 24.06 KG/M2

## 2019-11-21 DIAGNOSIS — D57.3 SICKLE CELL TRAIT (H): ICD-10-CM

## 2019-11-21 DIAGNOSIS — Z34.80 SUPERVISION OF OTHER NORMAL PREGNANCY, ANTEPARTUM: Primary | ICD-10-CM

## 2019-11-21 DIAGNOSIS — O09.899 H/O PRETERM DELIVERY, CURRENTLY PREGNANT: ICD-10-CM

## 2019-11-21 PROCEDURE — 99207 ZZC PRENATAL VISIT: CPT | Performed by: OBSTETRICS & GYNECOLOGY

## 2019-11-21 NOTE — PROGRESS NOTES
15w4d   Eating well.  Weight gain discussed.  Continue to monitor weight gain.   Discussed genetic screening.   Plan for 20wk US with MFM.   RTC 4 weeks  Zander Doty MD

## 2019-12-09 ENCOUNTER — TRANSFERRED RECORDS (OUTPATIENT)
Dept: HEALTH INFORMATION MANAGEMENT | Facility: CLINIC | Age: 29
End: 2019-12-09

## 2019-12-19 ENCOUNTER — PRENATAL OFFICE VISIT (OUTPATIENT)
Dept: OBGYN | Facility: CLINIC | Age: 29
End: 2019-12-19
Payer: COMMERCIAL

## 2019-12-19 VITALS
BODY MASS INDEX: 25.13 KG/M2 | SYSTOLIC BLOOD PRESSURE: 123 MMHG | WEIGHT: 151 LBS | OXYGEN SATURATION: 100 % | HEART RATE: 89 BPM | DIASTOLIC BLOOD PRESSURE: 77 MMHG

## 2019-12-19 DIAGNOSIS — D57.3 SICKLE CELL TRAIT (H): ICD-10-CM

## 2019-12-19 DIAGNOSIS — Z34.80 SUPERVISION OF OTHER NORMAL PREGNANCY, ANTEPARTUM: Primary | ICD-10-CM

## 2019-12-19 PROCEDURE — 99207 ZZC PRENATAL VISIT: CPT | Performed by: OBSTETRICS & GYNECOLOGY

## 2019-12-19 NOTE — PROGRESS NOTES
19w4d.   Doing well without issues/concerns.    She has had evaluation with [facility].    Routine anticipatory guidance.    U/S from [facility] reviewed  Zander Doty MD

## 2020-01-16 ENCOUNTER — PRENATAL OFFICE VISIT (OUTPATIENT)
Dept: OBGYN | Facility: CLINIC | Age: 30
End: 2020-01-16
Payer: COMMERCIAL

## 2020-01-16 VITALS
OXYGEN SATURATION: 99 % | BODY MASS INDEX: 26.59 KG/M2 | WEIGHT: 159.8 LBS | DIASTOLIC BLOOD PRESSURE: 77 MMHG | SYSTOLIC BLOOD PRESSURE: 122 MMHG | HEART RATE: 116 BPM

## 2020-01-16 DIAGNOSIS — Z34.80 SUPERVISION OF OTHER NORMAL PREGNANCY, ANTEPARTUM: Primary | ICD-10-CM

## 2020-01-16 DIAGNOSIS — D57.3 SICKLE CELL TRAIT (H): ICD-10-CM

## 2020-01-16 PROCEDURE — 99207 ZZC PRENATAL VISIT: CPT | Performed by: OBSTETRICS & GYNECOLOGY

## 2020-01-16 NOTE — PROGRESS NOTES
23w4d    Doing well.    She has had some heart burn.  She has tried antacid TUMS.   Weight gain discussed.  She has gained over 32 pounds so far.  Precautions discussed.   RTC 4 weeks  Zander Doty MD

## 2020-02-09 ENCOUNTER — OFFICE VISIT (OUTPATIENT)
Dept: URGENT CARE | Facility: URGENT CARE | Age: 30
End: 2020-02-09
Payer: COMMERCIAL

## 2020-02-09 VITALS
OXYGEN SATURATION: 100 % | DIASTOLIC BLOOD PRESSURE: 80 MMHG | RESPIRATION RATE: 18 BRPM | SYSTOLIC BLOOD PRESSURE: 116 MMHG | BODY MASS INDEX: 27.79 KG/M2 | HEART RATE: 93 BPM | WEIGHT: 167 LBS | TEMPERATURE: 98.3 F

## 2020-02-09 DIAGNOSIS — K04.7 TOOTH INFECTION: ICD-10-CM

## 2020-02-09 DIAGNOSIS — S02.5XXA CLOSED FRACTURE OF TOOTH, INITIAL ENCOUNTER: Primary | ICD-10-CM

## 2020-02-09 PROCEDURE — 99214 OFFICE O/P EST MOD 30 MIN: CPT | Performed by: PHYSICIAN ASSISTANT

## 2020-02-09 RX ORDER — ACETAMINOPHEN 325 MG/1
325-650 TABLET ORAL EVERY 6 HOURS PRN
COMMUNITY
End: 2021-03-01

## 2020-02-09 RX ORDER — LIDOCAINE HYDROCHLORIDE 20 MG/ML
15 SOLUTION OROPHARYNGEAL
Qty: 100 ML | Refills: 0 | Status: SHIPPED | OUTPATIENT
Start: 2020-02-09 | End: 2020-03-13

## 2020-02-09 RX ORDER — AMOXICILLIN 500 MG/1
500 CAPSULE ORAL 3 TIMES DAILY
Qty: 21 CAPSULE | Refills: 0 | Status: SHIPPED | OUTPATIENT
Start: 2020-02-09 | End: 2020-03-13

## 2020-02-09 NOTE — PROGRESS NOTES
SUBJECTIVE:   Shwetha Issa is a 29 year old female presenting with a chief complaint of broken molar on the lower left side as well as increased pain. Patient chipped her tooth about 2 weeks ago and did not have much pain at all initially. She states that her pain has become a 10/10 in the last 4-5 days. She denies fevers, chills, or other flu-like symptoms. She states that she has not noticed any discharge from the tooth. Chewing makes pain worse so patient has not been eating well. Patient is also pregnant.   Chief Complaint   Patient presents with     Dental Pain     2 days- lower left       She is an established patient of [place].    Review of Systems    Review Of Systems  Skin: negative  Eyes: negative  Ears/Nose/Throat: negative  Respiratory: No shortness of breath, dyspnea on exertion, cough, or hemoptysis  Cardiovascular: negative  Gastrointestinal: negative  Genitourinary: negative  Musculoskeletal: negative  Neurologic: negative  Psychiatric: negative  Hematologic/Lymphatic/Immunologic: negative  Endocrine: negative      Past Medical History:   Diagnosis Date     H/O colposcopy with cervical biopsy 05/10/2013    resolved     History of asthma 2008    exercise induced.  No symptoms since .     LSIL (low grade squamous intraepithelial lesion) on Pap smear 3/19/13    resolved     Sickle cell trait (H)      Trichomonal vulvovaginitis      Family History   Problem Relation Age of Onset     Cardiovascular Other         Hypertrophic cardiomyopathy in cousin,  in .  Shwetha had normal Echo in .     Thrombophilia Other         sickle cell     Diabetes Other      Hypertension Other      Gastrointestinal Disease Other         peptic ulcers     Blood Disease Maternal Grandfather         sickle cell disease     Blood Disease Mother         sickle cell trait     Cerebrovascular Disease Maternal Grandmother      Cerebrovascular Disease Paternal Grandmother      Current Outpatient Medications    Medication Sig Dispense Refill     acetaminophen (TYLENOL) 325 MG tablet Take 325-650 mg by mouth every 6 hours as needed for mild pain       Prenatal Vit-Fe Fumarate-FA (PNV PRENATAL PLUS MULTIVITAMIN) 27-1 MG TABS per tablet Take 1 tablet by mouth daily       albuterol (PROAIR HFA/PROVENTIL HFA/VENTOLIN HFA) 108 (90 BASE) MCG/ACT Inhaler Inhale 2 puffs into the lungs every 4 hours as needed for asthma symptoms. (Patient not taking: Reported on 9/24/2019) 1 Inhaler 0     diclofenac (VOLTAREN) 75 MG EC tablet Take 1 tablet (75 mg) by mouth 2 times daily as needed for moderate pain (Patient not taking: Reported on 9/24/2019) 30 tablet 1     methocarbamol (ROBAXIN) 750 MG tablet Take 1 tablet (750 mg) by mouth 3 times daily as needed for muscle spasms (Patient not taking: Reported on 9/24/2019) 30 tablet 1     ondansetron (ZOFRAN) 4 MG tablet Take 1 tablet (4 mg) by mouth every 6 hours as needed for nausea or vomiting (Patient not taking: Reported on 12/19/2019) 30 tablet 1     Social History     Tobacco Use     Smoking status: Never Smoker     Smokeless tobacco: Never Used   Substance Use Topics     Alcohol use: Not Currently     Alcohol/week: 0.0 standard drinks     Comment: pregnant       OBJECTIVE  /80 (BP Location: Left arm, Patient Position: Chair, Cuff Size: Adult Regular)   Pulse 93   Temp 98.3  F (36.8  C) (Oral)   Resp 18   Wt 75.8 kg (167 lb)   LMP 08/04/2019 (Exact Date)   SpO2 100%   BMI 27.79 kg/m      Physical Exam    Exam:  Constitutional: healthy, alert and no distress  Head: Normocephalic. No masses, lesions, tenderness or abnormalities  Neck: Neck supple. No adenopathy. Thyroid symmetric, normal size, Carotids without bruits.  ENT: Patient has mild inflammation of the gums of her lower left tooth (second from the back). The tooth itself is split down the middle and there is no bleeding or discharge. There does not appear to be an underlying absences formation.   Cardiovascular:  negative, PMI normal. No lifts, heaves, or thrills. RRR. No murmurs, clicks gallops or rub  Respiratory: negative, Percussion normal. Good diaphragmatic excursion. Lungs clear  Musculoskeletal: extremities normal- no gross deformities noted, gait normal and normal muscle tone  Skin: no suspicious lesions or rashes  Neurologic: Gait normal. Reflexes normal and symmetric. Sensation grossly WNL.  Psychiatric: mentation appears normal and affect normal/bright  Hematologic/Lymphatic/Immunologic: Normal cervical lymph nodes      ASSESSMENT/PLAN:    (S02.5XXA) Closed fracture of tooth, initial encounter  (primary encounter diagnosis)  Plan: lidocaine (XYLOCAINE) 2 % solution, amoxicillin        (AMOXIL) 500 MG capsule    (K04.7) Tooth infection  Plan: lidocaine (XYLOCAINE) 2 % solution, amoxicillin        (AMOXIL) 500 MG capsule    Patient will need to follow up with dentist as soon as possible for possible tooth extraction.     Okay to take acetaminophen 500 mg- 2 tabs (Total of 1000 mg) every 8 hrs   Okay to take ibuprofen 200 mg- 3 tabs (Total of 600 mg) every 6 hours      Ice left side of mouth 3x/day.     Og Rodriguez PA-C on 2/9/2020 at 10:59 AM

## 2020-02-13 ENCOUNTER — PRENATAL OFFICE VISIT (OUTPATIENT)
Dept: OBGYN | Facility: CLINIC | Age: 30
End: 2020-02-13
Payer: COMMERCIAL

## 2020-02-13 VITALS
DIASTOLIC BLOOD PRESSURE: 85 MMHG | WEIGHT: 171 LBS | BODY MASS INDEX: 28.46 KG/M2 | HEART RATE: 76 BPM | SYSTOLIC BLOOD PRESSURE: 125 MMHG

## 2020-02-13 DIAGNOSIS — D57.3 SICKLE CELL TRAIT (H): ICD-10-CM

## 2020-02-13 DIAGNOSIS — Z34.80 SUPERVISION OF OTHER NORMAL PREGNANCY, ANTEPARTUM: ICD-10-CM

## 2020-02-13 DIAGNOSIS — Z34.80 SUPERVISION OF OTHER NORMAL PREGNANCY, ANTEPARTUM: Primary | ICD-10-CM

## 2020-02-13 LAB
GLUCOSE 1H P 50 G GLC PO SERPL-MCNC: 56 MG/DL (ref 60–129)
HGB BLD-MCNC: 11.1 G/DL (ref 11.7–15.7)

## 2020-02-13 PROCEDURE — 99207 ZZC PRENATAL VISIT: CPT | Performed by: OBSTETRICS & GYNECOLOGY

## 2020-02-13 PROCEDURE — 00000218 ZZHCL STATISTIC OBHBG - HEMOGLOBIN: Performed by: OBSTETRICS & GYNECOLOGY

## 2020-02-13 PROCEDURE — 36415 COLL VENOUS BLD VENIPUNCTURE: CPT | Performed by: OBSTETRICS & GYNECOLOGY

## 2020-02-13 PROCEDURE — 82950 GLUCOSE TEST: CPT | Performed by: OBSTETRICS & GYNECOLOGY

## 2020-02-13 NOTE — PROGRESS NOTES
27w4d   Doing well.  No problems.   Labs today  Dental work later today.   RTC 2wk.  Zander Doty MD

## 2020-02-17 ENCOUNTER — TRANSFERRED RECORDS (OUTPATIENT)
Dept: HEALTH INFORMATION MANAGEMENT | Facility: CLINIC | Age: 30
End: 2020-02-17

## 2020-02-25 ENCOUNTER — PRENATAL OFFICE VISIT (OUTPATIENT)
Dept: OBGYN | Facility: CLINIC | Age: 30
End: 2020-02-25
Payer: COMMERCIAL

## 2020-02-25 VITALS
SYSTOLIC BLOOD PRESSURE: 121 MMHG | BODY MASS INDEX: 28.72 KG/M2 | WEIGHT: 172.6 LBS | DIASTOLIC BLOOD PRESSURE: 73 MMHG | HEART RATE: 89 BPM

## 2020-02-25 DIAGNOSIS — O09.899 H/O PRETERM DELIVERY, CURRENTLY PREGNANT: ICD-10-CM

## 2020-02-25 DIAGNOSIS — Z34.80 SUPERVISION OF OTHER NORMAL PREGNANCY, ANTEPARTUM: ICD-10-CM

## 2020-02-25 DIAGNOSIS — Z23 NEED FOR TDAP VACCINATION: Primary | ICD-10-CM

## 2020-02-25 DIAGNOSIS — D57.3 SICKLE CELL TRAIT (H): ICD-10-CM

## 2020-02-25 PROCEDURE — 99207 ZZC PRENATAL VISIT: CPT | Performed by: OBSTETRICS & GYNECOLOGY

## 2020-02-25 PROCEDURE — 90471 IMMUNIZATION ADMIN: CPT | Performed by: OBSTETRICS & GYNECOLOGY

## 2020-02-25 PROCEDURE — 90715 TDAP VACCINE 7 YRS/> IM: CPT | Performed by: OBSTETRICS & GYNECOLOGY

## 2020-02-25 NOTE — PROGRESS NOTES
29w2d Tired.To start iron for anemia.  No HA, visual changes, N/V etc.  Review GCT/Hgb.  Fell last week . Good FM.Has MFM U/S next week.  RTC 2 wk/prn.    MFM.Impression  =========    1) Intrauterine pregnancy at 28 1/7 weeks gestational age.  2) Bilateral post-axial polydactyly of the hands was again seen.  3) None of the other anomalies commonly detected by ultrasound were evident in the detailed fetal anatomic survey described above.  4) Growth parameters and estimated fetal weight were consistent with an appropriate for gestation age pattern of growth.  5) There is mild polyhydramnios.    Recommendation  ==============    Mild polyhydramnios was noted on today's ultrasound. Mild polyhydramnios is often idiopathic or related to underlying diabetes. If related to diabetes, proper management  can lead to resolution. Shwetha did pass her 1 hour GCT (56). None of the anomalies commonly associated with polyhydramnios have been identified. No change in  management is recommended for mild (ABRAM 24-29 cm) idiopathic polyhydramnios, with the exception of reevaluation. If there is progression to moderate (ABRAM 30-34 cm)  polyhydramnios delivery at 39 weeks is recommended along with weekly  testing. Management of severe (35+ cm) polyhydramnios is individualized.    A repeat assessment of the amniotic fluid volume is scheduled in our office in 2 weeks, as well as a repeat assessment of fetal growth and amniotic fluid volume in 4  weeks.    REPORT SIGNED BY: TRACIE STAPLETON MD      ICD-10-CM    1. Need for Tdap vaccination Z23 TDAP VACCINE     VACCINE ADMINISTRATION, INITIAL   2. Supervision of other normal pregnancy, antepartum Z34.80    3. H/O  delivery, currently pregnant O09.219    4. Sickle cell trait (H) D57.3      CEPHAS AGBEH, MD.

## 2020-02-25 NOTE — PROGRESS NOTES
Prior to immunization administration, verified patients identity using patient s name and date of birth. Please see Immunization Activity for additional information.     Screening Questionnaire for Adult Immunization    Are you sick today?   No   Do you have allergies to medications, food, a vaccine component or latex?   No   Have you ever had a serious reaction after receiving a vaccination?   No   Do you have a long-term health problem with heart, lung, kidney, or metabolic disease (e.g., diabetes), asthma, a blood disorder, no spleen, complement component deficiency, a cochlear implant, or a spinal fluid leak?  Are you on long-term aspirin therapy?   No   Do you have cancer, leukemia, HIV/AIDS, or any other immune system problem?   No   Do you have a parent, brother, or sister with an immune system problem?   No   In the past 3 months, have you taken medications that affect  your immune system, such as prednisone, other steroids, or anticancer drugs; drugs for the treatment of rheumatoid arthritis, Crohn s disease, or psoriasis; or have you had radiation treatments?   No   Have you had a seizure, or a brain or other nervous system problem?   No   During the past year, have you received a transfusion of blood or blood    products, or been given immune (gamma) globulin or antiviral drug?   No   For women: Are you pregnant or is there a chance you could become       pregnant during the next month?   Yes   Have you received any vaccinations in the past 4 weeks?   No     Immunization questionnaire was positive for at least one answer.  Notified Agbeh.        Per orders of Dr. Agbeh, injection of Tdap given by Yojana Rice MA. Patient instructed to remain in clinic for 15 minutes afterwards, and to report any adverse reaction to me immediately.       Screening performed by Yojana Rice MA on 2/25/2020 at 10:20 AM.

## 2020-02-25 NOTE — PATIENT INSTRUCTIONS
If you have any questions regarding your visit, Please contact your care team.  [employer]: [phone]  [facility] CLINIC HOURS TELEPHONE NUMBER   Cephas Agbeh, M.D.      Romy Miles-  [place]-         Monday-Job    8:00a.m-4:45 p.m    Tuesday--[place]     8:00a.m-4:45 p.m.    Thursday-Job    8:00a.m-4:45 p.m.    Friday-Job    8:00a.m-4:45 p.m    [facility]   [address] [place] [address]   [phone]-Ask for [facility]   Fax [phone]   [phone]     [facility] Labor and Delivery   [address] [place] [address] [place] [address]   [phone]    [facility]  [address] [facility] [address]  [phone]  [phone]   Urgent Care locations:    [facility] Monday-Friday  5 pm - 9 pm  Saturday and Sunday   9 am - 5 pm   Monday-Friday   5 pm - 9 pm  Saturday and Sunday  9 am - 5 pm    [phone]   If you need a medication refill, please contact your pharmacy. Please allow 3 business days for your refill to be completed.  As always, Thank you for trusting us with your healthcare needs!

## 2020-03-02 ENCOUNTER — TRANSFERRED RECORDS (OUTPATIENT)
Dept: HEALTH INFORMATION MANAGEMENT | Facility: CLINIC | Age: 30
End: 2020-03-02

## 2020-03-03 ENCOUNTER — TELEPHONE (OUTPATIENT)
Dept: OBGYN | Facility: CLINIC | Age: 30
End: 2020-03-03

## 2020-03-03 NOTE — TELEPHONE ENCOUNTER
[facility] Call Center    Phone Message    May a detailed message be left on voicemail: yes     Reason for Call: Form or Letter   Type or form/letter needing completion: dentist needs a form stating that she can see dental care and root canal. She is in a lot of pain and wants this done ASAP.  Provider: Lalitha  Date form needed: ASAP, appt is at 2:30 pm today  Once completed: Fax form to: [phone]  Dentist - [facility] dental in [facility]     Action Taken: Message routed to:  [facility] p [phone]

## 2020-03-03 NOTE — TELEPHONE ENCOUNTER
Letter sent to Dentist office.    Mandi Thomas MA on 3/3/2020 at 10:49 AM      Called and let patient know it has been faxed to her dental office and to call us if there is any trouble.    Mandi Thomas MA on 3/3/2020 at 12:08 PM

## 2020-03-13 ENCOUNTER — PRENATAL OFFICE VISIT (OUTPATIENT)
Dept: OBGYN | Facility: CLINIC | Age: 30
End: 2020-03-13
Payer: COMMERCIAL

## 2020-03-13 VITALS
WEIGHT: 170.7 LBS | OXYGEN SATURATION: 98 % | SYSTOLIC BLOOD PRESSURE: 117 MMHG | HEART RATE: 120 BPM | BODY MASS INDEX: 28.41 KG/M2 | DIASTOLIC BLOOD PRESSURE: 77 MMHG

## 2020-03-13 DIAGNOSIS — O09.892 SUPERVISION OF OTHER HIGH RISK PREGNANCIES, SECOND TRIMESTER: Primary | ICD-10-CM

## 2020-03-13 PROCEDURE — 99207 ZZC PRENATAL VISIT: CPT | Performed by: OBSTETRICS & GYNECOLOGY

## 2020-03-13 RX ORDER — FERROUS SULFATE 325(65) MG
325 TABLET ORAL
COMMUNITY
End: 2021-03-01

## 2020-03-13 NOTE — PROGRESS NOTES
"She reports feeling reassuring daily fetal activity and will continue to record.  She lost 2 lbs since her last visit but feels that she had a \"stomach bug\" this morning due to an episode of diarrhea but denies any other symptoms.  Her next appt with [facility] is scheduled for 3/16/2020 to recheck her AFV since she has had mild polyhydramnios.  She declines any fluid leakage or regular uterine contractions.  She also declines a flu vaccine.    "

## 2020-03-13 NOTE — PATIENT INSTRUCTIONS
If you have any questions regarding your visit, Please contact your care team.    [employer] [facility]: [phone]      [facility] HOURS TELEPHONE NUMBER   Nanda DO Tres.    BRENT [place] -    RANDOLPH Yeh, RANDOLPH Clancy RN     Monday, Wednesday, Thursday and Friday, [place]  8:30a.m-5:00 p.m   [facility]  [address] [place] [address]  [phone] ask for [facility]    Imaging [phone]       Urgent Care locations:    [facility] Saturday and Sunday   9 am - 5 pm    Monday-Friday   12 pm - 8 pm  Saturday and Sunday   9 am - 5 pm   [phone]     [facility] Labor and Delivery:  [phone]    If you need a medication refill, please contact your pharmacy. Please allow 3 business days for your refill to be completed.  As always, Thank you for trusting us with your healthcare needs!

## 2020-03-16 ENCOUNTER — TRANSFERRED RECORDS (OUTPATIENT)
Dept: HEALTH INFORMATION MANAGEMENT | Facility: CLINIC | Age: 30
End: 2020-03-16

## 2020-03-26 ENCOUNTER — PRENATAL OFFICE VISIT (OUTPATIENT)
Dept: OBGYN | Facility: CLINIC | Age: 30
End: 2020-03-26
Payer: COMMERCIAL

## 2020-03-26 VITALS
DIASTOLIC BLOOD PRESSURE: 77 MMHG | SYSTOLIC BLOOD PRESSURE: 124 MMHG | OXYGEN SATURATION: 99 % | WEIGHT: 175.8 LBS | BODY MASS INDEX: 29.25 KG/M2 | HEART RATE: 79 BPM

## 2020-03-26 DIAGNOSIS — O09.893 SUPERVISION OF OTHER HIGH RISK PREGNANCIES, THIRD TRIMESTER: Primary | ICD-10-CM

## 2020-03-26 PROCEDURE — 99207 ZZC PRENATAL VISIT: CPT | Performed by: OBSTETRICS & GYNECOLOGY

## 2020-03-26 NOTE — PATIENT INSTRUCTIONS
If you have any questions regarding your visit, Please contact your care team.    [employer] [facility]: [phone]      [facility] CLINIC HOURS TELEPHONE NUMBER   Nanda Joshua .    BRENT [place] -  [place] -       RANDOLPH Yeh, RN  Patricia, RN     Monday, Wednesday, Thursday and Friday, [place]  8:30a.m-5:00 p.m   [facility]  [address] [place] [address]  [phone] ask for [facility]    Imaging [phone]       Urgent Care locations:    [facility] Saturday and Sunday   9 am - 5 pm    Monday-Friday   12 pm - 8 pm  Saturday and Sunday   9 am - 5 pm   [phone]     [facility] Labor and Delivery:  [phone]    If you need a medication refill, please contact your pharmacy. Please allow 3 business days for your refill to be completed.  As always, Thank you for trusting us with your healthcare needs!

## 2020-03-26 NOTE — PROGRESS NOTES
She reports feeling reassuring daily fetal activity and will continue to record.  She gained 5 lbs since her last visit and denies any fluid leakage or regular uterine contractions.  We discussed her diagnosis of mild polyhydramnios and need for f/u with MFM on 4/14/20 or earlier prn.  Her ABRAM was at 26 cm at her last visit.

## 2020-04-07 ENCOUNTER — PRENATAL OFFICE VISIT (OUTPATIENT)
Dept: OBGYN | Facility: CLINIC | Age: 30
End: 2020-04-07
Payer: COMMERCIAL

## 2020-04-07 VITALS
DIASTOLIC BLOOD PRESSURE: 79 MMHG | HEART RATE: 97 BPM | BODY MASS INDEX: 29.85 KG/M2 | SYSTOLIC BLOOD PRESSURE: 127 MMHG | WEIGHT: 179.4 LBS

## 2020-04-07 DIAGNOSIS — O40.3XX0 POLYHYDRAMNIOS IN THIRD TRIMESTER COMPLICATION, SINGLE OR UNSPECIFIED FETUS: ICD-10-CM

## 2020-04-07 DIAGNOSIS — O09.893 SUPERVISION OF OTHER HIGH RISK PREGNANCIES, THIRD TRIMESTER: Primary | ICD-10-CM

## 2020-04-07 PROCEDURE — 99207 ZZC PRENATAL VISIT: CPT | Performed by: OBSTETRICS & GYNECOLOGY

## 2020-04-07 NOTE — PROGRESS NOTES
35w2d  No HA, visual changes, N/V etc.Using comfort measures for possible conjuctivitis. She declines eye ointment at this time. MFM appointment next week.Routine AG. See flowsheet. RTC 1 wk/prn.  GBS in one week.    ICD-10-CM    1. Supervision of other high risk pregnancies, third trimester  O09.893    2. Polyhydramnios in third trimester complication, single or unspecified fetus  O40.3XX0      CEPHAS AGBEH, MD.

## 2020-04-13 ENCOUNTER — TRANSFERRED RECORDS (OUTPATIENT)
Dept: HEALTH INFORMATION MANAGEMENT | Facility: CLINIC | Age: 30
End: 2020-04-13

## 2020-04-14 ENCOUNTER — PRENATAL OFFICE VISIT (OUTPATIENT)
Dept: OBGYN | Facility: CLINIC | Age: 30
End: 2020-04-14
Payer: COMMERCIAL

## 2020-04-14 VITALS
TEMPERATURE: 96.8 F | SYSTOLIC BLOOD PRESSURE: 125 MMHG | BODY MASS INDEX: 29.95 KG/M2 | HEART RATE: 85 BPM | DIASTOLIC BLOOD PRESSURE: 80 MMHG | WEIGHT: 180 LBS

## 2020-04-14 DIAGNOSIS — O09.893 SUPERVISION OF OTHER HIGH RISK PREGNANCIES, THIRD TRIMESTER: Primary | ICD-10-CM

## 2020-04-14 PROCEDURE — 87653 STREP B DNA AMP PROBE: CPT | Performed by: OBSTETRICS & GYNECOLOGY

## 2020-04-14 PROCEDURE — 99207 ZZC PRENATAL VISIT: CPT | Performed by: OBSTETRICS & GYNECOLOGY

## 2020-04-14 RX ORDER — CALCIUM CARBONATE 500 MG/1
1 TABLET, CHEWABLE ORAL 2 TIMES DAILY
COMMUNITY
End: 2021-01-10

## 2020-04-14 NOTE — PROGRESS NOTES
36w2d  No HA, visual changes, N/V etc.  Labor plan and warning s/s discussed. Routine AG. See flowsheet.  Nurse for exam .RTC/telephone visit 1 wk/prn.  GBS done.  MFMImpression  =========    1) Intrauterine pregnancy at 36 1/7 weeks gestational age.  2) None of the anomalies commonly detected by ultrasound were evident in the limited fetal anatomic survey described above. There is known fetal polydactyly, which could  not be well seen today due to fetal position.  3) Growth parameters and estimated fetal weight were consistent with an appropriate for gestation age pattern of growth.  4) The amniotic fluid volume appeared normal.    Recommendation  ==============    We discussed the findings on today's ultrasound with the patient.    There has been interval resolution of the previously noted fetal polyhydramnios. Further ultrasound studies as clinically indicated.    Return to primary provider for continued prenatal care.    Thank you for the opportunity to participate in the care of this patient. If you have questions regarding today's evaluation or if we can be of further service, please contact the  [facility].    **Fetal anomalies may be present but not detected**.    REPORT SIGNED BY: PETRA PA MD    ICD-10-CM    1. Supervision of other high risk pregnancies, third trimester  O09.893 Strep, Group B by PCR CEPHAS AGBEH, MD.  .

## 2020-04-15 LAB
GP B STREP DNA SPEC QL NAA+PROBE: NEGATIVE
SPECIMEN SOURCE: NORMAL

## 2020-04-22 ENCOUNTER — VIRTUAL VISIT (OUTPATIENT)
Dept: OBGYN | Facility: CLINIC | Age: 30
End: 2020-04-22
Payer: COMMERCIAL

## 2020-04-22 DIAGNOSIS — Z34.83 ENCOUNTER FOR SUPERVISION OF OTHER NORMAL PREGNANCY, THIRD TRIMESTER: Primary | ICD-10-CM

## 2020-04-22 PROCEDURE — 99207 ZZC PRENATAL VISIT: CPT | Performed by: OBSTETRICS & GYNECOLOGY

## 2020-04-22 NOTE — PROGRESS NOTES
"Shwetha Issa is a 29 year old female who is being evaluated via a billable telephone visit.      The patient has been notified of following:     \"This telephone visit will be conducted via a call between you and your physician/provider. We have found that certain health care needs can be provided without the need for a physical exam.  This service lets us provide the care you need with a short phone conversation.  If a prescription is necessary we can send it directly to your pharmacy.  If lab work is needed we can place an order for that and you can then stop by our lab to have the test done at a later time.    Telephone visits are billed at different rates depending on your insurance coverage. During this emergency period, for some insurers they may be billed the same as an in-person visit.  Please reach out to your insurance provider with any questions.    If during the course of the call the physician/provider feels a telephone visit is not appropriate, you will not be charged for this service.\"    Patient has given verbal consent for Telephone visit?  Yes    How would you like to obtain your AVS? Carinaharsofia     This 28 y/o female, , O+ blood type, at 37 3/7 weeks gestation states that she is feeling reassuring daily fetal activity and will continue to record.  She denies any fluid leakage or regular uterine contractions.  She has not weighed herself since her last visit but feels that she is gaining an appropriate amount of weight.  She does c/o heartburn right after all 3 meals per day so we discussed eating 6 smaller meals per day and she will try.  She is already sleeping with elevation of her head so does not lie flat.  She understands to be seen in the clinic weekly till delivery now.  Her EDC is 5/10/2020.  All her questions and concerns were addressed.    Phone call duration: 8 minutes    Nanda Joshua DO  FACOG, FACS        "

## 2020-05-05 ENCOUNTER — PRENATAL OFFICE VISIT (OUTPATIENT)
Dept: OBGYN | Facility: CLINIC | Age: 30
End: 2020-05-05
Payer: COMMERCIAL

## 2020-05-05 VITALS
OXYGEN SATURATION: 99 % | WEIGHT: 186.9 LBS | SYSTOLIC BLOOD PRESSURE: 128 MMHG | BODY MASS INDEX: 31.1 KG/M2 | DIASTOLIC BLOOD PRESSURE: 83 MMHG | HEART RATE: 77 BPM

## 2020-05-05 DIAGNOSIS — N89.8 VAGINAL ODOR: ICD-10-CM

## 2020-05-05 DIAGNOSIS — B96.89 BV (BACTERIAL VAGINOSIS): ICD-10-CM

## 2020-05-05 DIAGNOSIS — N89.8 VAGINAL DISCHARGE: ICD-10-CM

## 2020-05-05 DIAGNOSIS — O09.893 SUPERVISION OF OTHER HIGH RISK PREGNANCIES, THIRD TRIMESTER: Primary | ICD-10-CM

## 2020-05-05 DIAGNOSIS — N76.0 BV (BACTERIAL VAGINOSIS): ICD-10-CM

## 2020-05-05 LAB
SPECIMEN SOURCE: ABNORMAL
WET PREP SPEC: ABNORMAL

## 2020-05-05 PROCEDURE — 99207 ZZC PRENATAL VISIT: CPT | Performed by: OBSTETRICS & GYNECOLOGY

## 2020-05-05 PROCEDURE — 87210 SMEAR WET MOUNT SALINE/INK: CPT | Performed by: OBSTETRICS & GYNECOLOGY

## 2020-05-05 PROCEDURE — 59426 ANTEPARTUM CARE ONLY: CPT | Performed by: OBSTETRICS & GYNECOLOGY

## 2020-05-05 RX ORDER — METRONIDAZOLE 500 MG/1
500 TABLET ORAL 2 TIMES DAILY
Qty: 14 TABLET | Refills: 0 | Status: SHIPPED | OUTPATIENT
Start: 2020-05-05 | End: 2020-08-12

## 2020-05-05 NOTE — PROGRESS NOTES
39w2d    No HA, visual changes, N/V   She has had some thin discharge with a mild odor.  Wonders about BV.  She has not had itching.  She has no aggravating or alleviating factors.    Labor plan and warning s/s discussed.   Wet prep performed.  Clue cells seen.  Flagyl prescription is sent to pharmacy.   RTC 1 wk  Zander Doty MD

## 2020-05-06 ENCOUNTER — TELEPHONE (OUTPATIENT)
Dept: OBGYN | Facility: CLINIC | Age: 30
End: 2020-05-06

## 2020-05-06 NOTE — TELEPHONE ENCOUNTER
RN took call from patient.    Patient's OB history (G & Ps):  currently 39w3d  Patient's complaints/signs and symptoms: Patient states she has been having contractions every 5-7 minutes lasting 45-60 seconds, Patient unable to talk through contractions.     Plan: Pt  is picking her up in 5 minutes as she is home alone and they will be on their way to L&D.    RN asked if patient needs to continue to talk to RN until her  arrives and pt states no she is OK.    RN paged L&D RN Patricia and Dr. Agbeh and gave SBAR.    Patient verbalized understanding and agreed to plan.     Patient was given the opportunity to ask additional questions and have them answered. Patient had no further questions.     Patricia Lackey RN on 2020 at 3:32 PM

## 2020-05-19 ENCOUNTER — TELEPHONE (OUTPATIENT)
Dept: OBGYN | Facility: CLINIC | Age: 30
End: 2020-05-19

## 2020-05-19 DIAGNOSIS — B96.89 BV (BACTERIAL VAGINOSIS): Primary | ICD-10-CM

## 2020-05-19 DIAGNOSIS — N76.0 BV (BACTERIAL VAGINOSIS): Primary | ICD-10-CM

## 2020-05-19 RX ORDER — METRONIDAZOLE 500 MG/1
500 TABLET ORAL 2 TIMES DAILY
Qty: 3 TABLET | Refills: 0 | Status: SHIPPED | OUTPATIENT
Start: 2020-05-19 | End: 2020-08-12

## 2020-05-19 NOTE — TELEPHONE ENCOUNTER
[facility] Call Center    Phone Message    May a detailed message be left on voicemail: no     Reason for Call: Other: pt requesting call back.  pt stopped taking medication metronidazole before delivery 5.6.20.  Pt still having symptoms ph imbalance. (???).  Pt asking for a call back.  Pt asking for another dose or should she continue medication that she has.  Pt is breast feeding also.     Action Taken: Message routed to:  [facility] p [phone]    Travel Screening:

## 2020-05-19 NOTE — TELEPHONE ENCOUNTER
Per pt's wet prep on 2020 she had many clue cells and was prescribed Flagyl.    Spoke with pt.  She states she took 3 doses of the Flagyl and then she ended up going into labor and delivering her baby so didn't take anymore.  Now as pt is healing from her vaginal delivery on  she is noticing that she is still experiencing the same symptoms she had when she was diagnosed with BV.  She states she has some vaginal burning/itching and a foul smelling discharge.  She said she had a hard time sleeping last night because it was so uncomfortable.  Pt believes she still has BV since she didn't complete the full course of abx.  She still has 11 pills left but wasn't sure if she should start to take them since it has been almost 2 weeks since she took the other 3 doses.  She is also breastfeeding her  so isn't sure if they are safe to take.    There are no available appointments today or tomorrow so I will route to Dr. Doty to see if he would be willing to send in a new rx, or if she can just take her remaining 11 pills from her rx or does she need to come in for another wet prep.    Ruba Armas, RN

## 2020-05-19 NOTE — TELEPHONE ENCOUNTER
Relayed Dr. Doty's message below to pt.  She will start taking the Flagyl tonight and continue to take BID for 7 days.  Instructed pt to call if she continues to have symptoms after she completes the 7 day course.    Ruba Armas RN

## 2020-05-19 NOTE — TELEPHONE ENCOUNTER
Prescription is sent for an additional 3 tablets to complete the full 7 days she is to take the Flagyl.   Zander Doty MD

## 2020-06-08 ENCOUNTER — MEDICAL CORRESPONDENCE (OUTPATIENT)
Dept: HEALTH INFORMATION MANAGEMENT | Facility: CLINIC | Age: 30
End: 2020-06-08

## 2020-06-12 ENCOUNTER — PRENATAL OFFICE VISIT (OUTPATIENT)
Dept: OBGYN | Facility: CLINIC | Age: 30
End: 2020-06-12
Payer: COMMERCIAL

## 2020-06-12 VITALS
WEIGHT: 161.7 LBS | BODY MASS INDEX: 26.91 KG/M2 | DIASTOLIC BLOOD PRESSURE: 89 MMHG | SYSTOLIC BLOOD PRESSURE: 125 MMHG | HEART RATE: 77 BPM

## 2020-06-12 PROCEDURE — 99207 ZZC POST PARTUM EXAM: CPT | Performed by: OBSTETRICS & GYNECOLOGY

## 2020-06-12 NOTE — PATIENT INSTRUCTIONS
If you have any questions regarding your visit, Please contact your care team.    [employer] [facility]: [phone]      [facility] CLINIC HOURS TELEPHONE NUMBER   Nanda Joshua .    BRENT [place] -  [place] -       RANDOLPH Yeh, RN  Patricia, RN     Monday, Wednesday, Thursday and Friday, [place]  8:30a.m-5:00 p.m   [facility]  [address] [place] [address]  [phone] ask for [facility]    Imaging [phone]       Urgent Care locations:    [facility] Saturday and Sunday   9 am - 5 pm    Monday-Friday   12 pm - 8 pm  Saturday and Sunday   9 am - 5 pm   [phone]     [facility] Labor and Delivery:  [phone]    If you need a medication refill, please contact your pharmacy. Please allow 3 business days for your refill to be completed.  As always, Thank you for trusting us with your healthcare needs!

## 2020-06-12 NOTE — PROGRESS NOTES
Shwetha is here for a postpartum checkup.  She denies any issues after delivery and would like to discuss her contraceptive options. She is not interested in sterilization, however, since she wants to try one more time for a girl infant.  She is bottle-feeding and denies any issues with her breasts.    She had a   OB History    Para Term  AB Living   3 2 1 1 1 3   SAB TAB Ectopic Multiple Live Births   1 0 0 1 3      # Outcome Date GA Lbr Mayank/2nd Weight Sex Delivery Anes PTL Lv   3 Term 20 39w3d  2.807 kg (6 lb 3 oz) M    PARK   2A  17 33w4d 07:00 / 00:20 1.843 kg (4 lb 1 oz) M  EPI Y PARK      Complications:  premature rupture of membranes (PPROM) delivered, current hospitalization, Twin pregnancy, Preeclampsia in postpartum period      Name: Viraj   2B  17 33w4d  1.559 kg (3 lb 7 oz) M Vag-Breech EPI Y PARK      Name: Kaiser Rodgers SAB 10/24/16 11w6d    SAB         Birth Comments: no D&C      Since delivery, she has not been breast feeding.  She has not had a normal menses.  She has not had intercourse.  Patient screened for postpartum depression and complaints are NEGATIVE. Screening has also been completed for intimate partner violence. She would like to discuss her contraceptive options but not sterilization.      PE: /89   Pulse 77   Wt 73.3 kg (161 lb 11.2 oz)   LMP 2019 (Exact Date)   Breastfeeding No   BMI 26.91 kg/m    Body mass index is 26.91 kg/m .    General Appearance:  healthy, alert, active, no distress  Cardiovascular:  Regular rate and Rhythm without murmur  Lungs:  Clear, without wheeze, rale or rhonchi  Abdomen: Benign, Soft, flat, non-tender, No masses, organomegaly, No inguinal nodes and Bowel sounds normoactive.   Pelvic:       - Ext: Vulva and perineum are normal without lesion, mass or discharge, sutures have dissolved        - Bladder: no tenderness, no masses       - Vagina: Normal mucosa, no discharge        -  Cervix: normal and multiparous       - Uterus:Normal shape, position and consistencyfirm, nontender, nongravid uterus without CMT       - Adnexa: Normal without masses or tenderness       - Rectal: deferred    A/P  Routine Postpartum Exam, Contraceptive Consult     - I discussed the new pap recommendations regarding screening.  Explained the rationale for increased intervals between paps.  Questions asked and answered.  She does agree to this regiment.   - Pap was not performed since not due   - Contraception: foam and condoms.  She will call back if she decides on a hormonal method or wants Depo Provera, an IUD inserted, or Nexplanon.    This was a 30-minute visit and over 50% of the time was spent in direct patient consultation.    Nanda Joshua DO  FACOG, FACS

## 2020-07-06 ENCOUNTER — MEDICAL CORRESPONDENCE (OUTPATIENT)
Dept: HEALTH INFORMATION MANAGEMENT | Facility: CLINIC | Age: 30
End: 2020-07-06

## 2020-07-10 ENCOUNTER — VIRTUAL VISIT (OUTPATIENT)
Dept: FAMILY MEDICINE | Facility: CLINIC | Age: 30
End: 2020-07-10
Payer: COMMERCIAL

## 2020-07-10 DIAGNOSIS — B37.31 YEAST INFECTION OF THE VAGINA: Primary | ICD-10-CM

## 2020-07-10 PROCEDURE — 99213 OFFICE O/P EST LOW 20 MIN: CPT | Mod: 95 | Performed by: FAMILY MEDICINE

## 2020-07-10 RX ORDER — FLUCONAZOLE 150 MG/1
150 TABLET ORAL ONCE
Qty: 1 TABLET | Refills: 0 | Status: SHIPPED | OUTPATIENT
Start: 2020-07-10 | End: 2020-08-12

## 2020-07-10 NOTE — PROGRESS NOTES
"Shwetha Issa is a 29 year old female who is being evaluated via a billable telephone visit.      The patient has been notified of following:     \"This telephone visit will be conducted via a call between you and your physician/provider. We have found that certain health care needs can be provided without the need for a physical exam.  This service lets us provide the care you need with a short phone conversation.  If a prescription is necessary we can send it directly to your pharmacy.  If lab work is needed we can place an order for that and you can then stop by our lab to have the test done at a later time.    Telephone visits are billed at different rates depending on your insurance coverage. During this emergency period, for some insurers they may be billed the same as an in-person visit.  Please reach out to your insurance provider with any questions.    If during the course of the call the physician/provider feels a telephone visit is not appropriate, you will not be charged for this service.\"    Patient has given verbal consent for Telephone visit?  Yes    What phone number would you like to be contacted at? [phone]    How would you like to obtain your AVS? Kathy Cabral     Shwetha Issa is a 29 year old female who presents via phone visit today for the following health issues:    HPI  Vaginal Symptoms      Duration: 2 days    Description  vaginal discharge - creamy thicker more clear whitish, itching, a little burning    Intensity:  moderate    Accompanying signs and symptoms (fever/dysuria/abdominal or back pain): None    History  Sexually active: not at present, not sexually active since had baby 5/6  Possibility of pregnancy: No  Recent antibiotic use: no, treated for BV in May    Precipitating or alleviating factors: started using new detergent.    Therapies tried and outcome: none   Outcome: NA    Spoke with patient via phone regarding the above.  Has had a thicker slightly itchy " discharge for couple of days.  Was actually treated for bacterial vaginosis a couple of months ago and says this definitely feels a bit different, more like previous yeast infections.  No abdominal pain.  No fevers or chills or similar.    Reviewed and updated as needed this visit by Provider         Review of Systems   Constitutional, HEENT, cardiovascular, pulmonary, gi and gu systems are negative, except as otherwise noted.       Objective   Reported vitals:  LMP 08/04/2019 (Exact Date)    healthy, alert and no distress  PSYCH: Alert and oriented times 3; coherent speech, normal   rate and volume, able to articulate logical thoughts, able   to abstract reason, no tangential thoughts, no hallucinations   or delusions  Her affect is normal  RESP: No cough, no audible wheezing, able to talk in full sentences  Remainder of exam unable to be completed due to telephone visits    Diagnostic Test Results:  Labs reviewed in Epic        Assessment/Plan:  1. Yeast infection of the vagina  Discussed mechanism of action of the proposed medication, as well as potential effects, both good and bad.  Patient expressed understanding and agreed with treatment.   - fluconazole (DIFLUCAN) 150 MG tablet; Take 1 tablet (150 mg) by mouth once for 1 dose  Dispense: 1 tablet; Refill: 0    Return in about 1 week (around 7/17/2020) for If not improving as expected.      Phone call duration:  6 minutes    Summer Berry MD

## 2020-07-11 ASSESSMENT — ASTHMA QUESTIONNAIRES: ACT_TOTALSCORE: 24

## 2020-08-12 ENCOUNTER — VIRTUAL VISIT (OUTPATIENT)
Dept: FAMILY MEDICINE | Facility: CLINIC | Age: 30
End: 2020-08-12
Payer: COMMERCIAL

## 2020-08-12 DIAGNOSIS — R30.0 DYSURIA: Primary | ICD-10-CM

## 2020-08-12 PROCEDURE — 99213 OFFICE O/P EST LOW 20 MIN: CPT | Mod: 95 | Performed by: NURSE PRACTITIONER

## 2020-08-12 ASSESSMENT — PAIN SCALES - GENERAL: PAINLEVEL: NO PAIN (0)

## 2020-08-12 NOTE — PROGRESS NOTES
"Shwetha Issa is a 29 year old female who is being evaluated via a billable telephone visit.      The patient has been notified of following:     \"This telephone visit will be conducted via a call between you and your physician/provider. We have found that certain health care needs can be provided without the need for a physical exam.  This service lets us provide the care you need with a short phone conversation.  If a prescription is necessary we can send it directly to your pharmacy.  If lab work is needed we can place an order for that and you can then stop by our lab to have the test done at a later time.    Telephone visits are billed at different rates depending on your insurance coverage. During this emergency period, for some insurers they may be billed the same as an in-person visit.  Please reach out to your insurance provider with any questions.    If during the course of the call the physician/provider feels a telephone visit is not appropriate, you will not be charged for this service.\"    Patient has given verbal consent for Telephone visit?  Yes    What phone number would you like to be contacted at? [phone]    How would you like to obtain your AVS? Kathy Cabral     Shwetha Issa is a 29 year old female who presents via phone visit today for the following health issues:    HPI    Vaginal Symptoms  Onset: off and on     Description:  Vaginal Discharge: yellow and white    Itching (Pruritis): no   Burning sensation:  no   Odor: YES-sour    Accompanying Signs & Symptoms:  Pain with Urination: no   Abdominal Pain: no   Fever: no     History:   Sexually active: YES but not since she delivered in May  New Partner: no   Possibility of Pregnancy:  No    Precipitating factors:   Recent Antibiotic Use: YES-was treated for candida vaginitis with Diflucan on 7/10/20. NO belly pain,, fever or chills.    Therapies Tried and outcome: OTC medication           Patient Active Problem List   Diagnosis     " CARDIOVASCULAR SCREENING; LDL GOAL LESS THAN 160     Intermittent asthma     Sickle cell trait (H)     Past Surgical History:   Procedure Laterality Date     HC COLP CERVIX/UPPER VAGINA  2013    neg       Social History     Tobacco Use     Smoking status: Never Smoker     Smokeless tobacco: Never Used   Substance Use Topics     Alcohol use: Not Currently     Alcohol/week: 0.0 standard drinks     Comment: pregnant     Family History   Problem Relation Age of Onset     Cardiovascular Other         Hypertrophic cardiomyopathy in cousin,  in .  Shwetha had normal Echo in .     Thrombophilia Other         sickle cell     Diabetes Other      Hypertension Other      Gastrointestinal Disease Other         peptic ulcers     Blood Disease Maternal Grandfather         sickle cell disease     Blood Disease Mother         sickle cell trait     Cerebrovascular Disease Maternal Grandmother      Cerebrovascular Disease Paternal Grandmother          Current Outpatient Medications   Medication Sig Dispense Refill     acetaminophen (TYLENOL) 325 MG tablet Take 325-650 mg by mouth every 6 hours as needed for mild pain       calcium carbonate (TUMS) 500 MG chewable tablet Take 1 chew tab by mouth 2 times daily       ferrous sulfate (FEROSUL) 325 (65 Fe) MG tablet Take 325 mg by mouth daily (with breakfast)       Prenatal Vit-Fe Fumarate-FA (PNV PRENATAL PLUS MULTIVITAMIN) 27-1 MG TABS per tablet Take 1 tablet by mouth daily       BP Readings from Last 3 Encounters:   20 125/89   20 128/83   20 125/80    Wt Readings from Last 3 Encounters:   20 73.3 kg (161 lb 11.2 oz)   20 84.8 kg (186 lb 14.4 oz)   20 81.6 kg (180 lb)                    Reviewed and updated as needed this visit by Provider         Review of Systems   Constitutional, HEENT, cardiovascular, pulmonary, gi and gu systems are negative, except as otherwise noted.       Objective          Vitals:  No vitals were obtained today  due to virtual visit.    healthy, alert and no distress  PSYCH: Alert and oriented times 3; coherent speech, normal   rate and volume, able to articulate logical thoughts, able   to abstract reason, no tangential thoughts, no hallucinations   or delusions  Her affect is normal and pleasant  RESP: No cough, no audible wheezing, able to talk in full sentences  Remainder of exam unable to be completed due to telephone visits    Diagnostic Test Results:  Labs reviewed in Epic  No results found for this or any previous visit (from the past 24 hour(s)).        Assessment & Plan     1. Dysuria  Patient ill come in to the clinic tomorrow for labs- advised to schedule lab only appt.  I'll be in touch once I have reviewed her results.   Reviewed genital hygiene.  - Wet prep; Future  - *UA reflex to Microscopic and Culture ([place] and [facility] (except [place] and [place]); Future       See Patient Instructions  Phone call duration:  5 min  No follow-ups on file.    [facility] [place]

## 2020-08-12 NOTE — PATIENT INSTRUCTIONS
At [facility], we strive to deliver an exceptional experience to you, every time we see you. If you receive a survey, please complete it as we do value your feedback.  If you have MyChart, you can expect to receive results automatically within 24 hours of their completion.  Your provider will send a note interpreting your results as well.   If you do not have MyChart, you should receive your results in about a week by mail.    Your care team:                            Family Medicine Internal Medicine   MD Yo Armenta, MD Dion Rea MD Katya Georgiev PA-C Megan Hill, APRN CNP    Deshaun Wiseman, MD Pediatrics   Steve Almendarez, PAJavanC  Kendra Hopkins, CNP MD Nichole Herndon APRN CNP   MD Emma Saeed MD Deborah Mielke, MD Dottie Benites, APRN CNP  Melissa Wilson PAJACKSON Napier, CNP  MD Jennifer Sahni MD Angela Wermerskirchen, MD      Clinic hours: Monday - Thursday 7 am-7 pm; Fridays 7 am-5 pm.   Urgent care: Monday - Friday 11 am-9 pm; Saturday and Sunday 9 am-5 pm.    Clinic: [phone]       [facility]: Monday - Thursday 8 am - 7 pm; Friday 8 am - 6 pm  [facility]: [phone]     Use www.[email or web address].org for 24/7 diagnosis and treatment of dozens of conditions.    Patient Education     Dysuria     Painful urination (dysuria) is often caused by a problem in the urinary tract.   Dysuria is pain felt during urination. It is often described as a burning. Learn more about this problem and how it can be treated.  What causes dysuria?  Possible causes include:    Infection with a bacteria or virus such as a urinary tract infection (UTI or a sexually transmitted infection (STI)    Sensitivity or allergy to chemicals such as those found in lotions and other products    Prostate or bladder problems    Radiation therapy to  "the pelvic area  How is dysuria diagnosed?  Your healthcare provider will examine you. He or she will ask about your symptoms and health. After talking with you and doing a physical exam, your healthcare provider may know what is causing your dysuria. He or she will usually request  a sample of your urine. Tests of your urine, or a \"urinalysis,\" are done. A urinalysis may include:    Looking at the urine sample (visual exam)    Checking for substances (chemical exam)    Looking at a small amount under a microscope (microscopic exam)  Some parts of the urinalysis may be done in the provider's office and some in a lab. And, the urine sample may be checked for bacteria and yeast (urine culture). Your healthcare provider will tell you more about these tests if they are needed.  How is dysuria treated?  Treatment depends on the cause. If you have a bacterial infection, you may need antibiotics. You may be given medicines to make it easier for you to urinate and help relieve pain. Your healthcare provider can tell you more about your treatment options. Untreated, symptoms may get worse.  When to call your healthcare provider  Call the healthcare provider right away if you have any of the following:    Fever of 100.4 F (38 C) or higher     No improvement after three days of treatment    Trouble urinating because of pain    New or increased discharge from the vagina or penis    Rash or joint pain    Increased back or abdominal pain    Enlarged painful lymph nodes (lumps) in the groinTh   Date Last Reviewed: 1/1/2017 2000-2019 [employer]. [address] [place] [address]. All rights reserved. This information is not intended as a substitute for professional medical care. Always follow your healthcare professional's instructions.           "

## 2020-08-13 DIAGNOSIS — N76.0 BV (BACTERIAL VAGINOSIS): Primary | ICD-10-CM

## 2020-08-13 DIAGNOSIS — B96.89 BV (BACTERIAL VAGINOSIS): Primary | ICD-10-CM

## 2020-08-13 DIAGNOSIS — R30.0 DYSURIA: ICD-10-CM

## 2020-08-13 LAB
ALBUMIN UR-MCNC: ABNORMAL MG/DL
APPEARANCE UR: CLEAR
BACTERIA #/AREA URNS HPF: ABNORMAL /HPF
BILIRUB UR QL STRIP: NEGATIVE
COLOR UR AUTO: YELLOW
GLUCOSE UR STRIP-MCNC: NEGATIVE MG/DL
HGB UR QL STRIP: ABNORMAL
KETONES UR STRIP-MCNC: ABNORMAL MG/DL
LEUKOCYTE ESTERASE UR QL STRIP: ABNORMAL
MUCOUS THREADS #/AREA URNS LPF: PRESENT /LPF
NITRATE UR QL: NEGATIVE
NON-SQ EPI CELLS #/AREA URNS LPF: ABNORMAL /LPF
PH UR STRIP: 6 PH (ref 5–7)
RBC #/AREA URNS AUTO: ABNORMAL /HPF
SOURCE: ABNORMAL
SP GR UR STRIP: >1.03 (ref 1–1.03)
SPECIMEN SOURCE: ABNORMAL
UROBILINOGEN UR STRIP-ACNC: 1 EU/DL (ref 0.2–1)
WBC #/AREA URNS AUTO: ABNORMAL /HPF
WET PREP SPEC: ABNORMAL

## 2020-08-13 PROCEDURE — 87086 URINE CULTURE/COLONY COUNT: CPT | Performed by: NURSE PRACTITIONER

## 2020-08-13 PROCEDURE — 81001 URINALYSIS AUTO W/SCOPE: CPT | Performed by: NURSE PRACTITIONER

## 2020-08-13 PROCEDURE — 87210 SMEAR WET MOUNT SALINE/INK: CPT | Performed by: NURSE PRACTITIONER

## 2020-08-13 RX ORDER — METRONIDAZOLE 500 MG/1
500 TABLET ORAL 2 TIMES DAILY
Qty: 14 TABLET | Refills: 0 | Status: SHIPPED | OUTPATIENT
Start: 2020-08-13 | End: 2020-08-20

## 2020-08-14 LAB
BACTERIA SPEC CULT: NO GROWTH
SPECIMEN SOURCE: NORMAL

## 2020-09-03 ENCOUNTER — TELEPHONE (OUTPATIENT)
Dept: FAMILY MEDICINE | Facility: CLINIC | Age: 30
End: 2020-09-03

## 2020-09-03 ENCOUNTER — OFFICE VISIT (OUTPATIENT)
Dept: URGENT CARE | Facility: URGENT CARE | Age: 30
End: 2020-09-03
Payer: COMMERCIAL

## 2020-09-03 VITALS
RESPIRATION RATE: 16 BRPM | SYSTOLIC BLOOD PRESSURE: 125 MMHG | HEART RATE: 82 BPM | TEMPERATURE: 98.1 F | OXYGEN SATURATION: 99 % | WEIGHT: 149.3 LBS | BODY MASS INDEX: 24.84 KG/M2 | DIASTOLIC BLOOD PRESSURE: 76 MMHG

## 2020-09-03 DIAGNOSIS — N89.8 VAGINAL DISCHARGE: Primary | ICD-10-CM

## 2020-09-03 LAB
SPECIMEN SOURCE: NORMAL
WET PREP SPEC: NORMAL

## 2020-09-03 PROCEDURE — 87210 SMEAR WET MOUNT SALINE/INK: CPT | Performed by: PHYSICIAN ASSISTANT

## 2020-09-03 PROCEDURE — 99213 OFFICE O/P EST LOW 20 MIN: CPT | Performed by: PHYSICIAN ASSISTANT

## 2020-09-03 RX ORDER — FLUCONAZOLE 150 MG/1
150 TABLET ORAL ONCE
Qty: 1 TABLET | Refills: 0 | Status: SHIPPED | OUTPATIENT
Start: 2020-09-03 | End: 2020-09-03

## 2020-09-03 ASSESSMENT — PAIN SCALES - GENERAL: PAINLEVEL: MODERATE PAIN (4)

## 2020-09-03 NOTE — TELEPHONE ENCOUNTER
Reason for call:  Patient reporting a symptom    Symptom or request: Discomfort, discharge     Duration (how long have symptoms been present): On going issue     Have you been treated for this before? Yes    Additional comments: Patient states she was diagnosed with BV then was treated then had yeast infection then BV and now she believes she has a yeast infection again and she thinks treating the BV is creating a yeast infection. Patient would like to know if she can just do labs. Patient was made aware that a virtual visit may be needed.     Phone Number patient can be reached at:  Cell number on file:    Telephone Information:   Mobile [phone]       Best Time:  Any    Can we leave a detailed message on this number:  YES    Call taken on 9/3/2020 at 10:27 AM by Vj Gonzalez

## 2020-09-03 NOTE — TELEPHONE ENCOUNTER
Since it has been more than 7 days since last appointment, patient will need a visit of some sort.   Team to please call patient and schedule recheck appointment.       Elizabeth Ansari RN, BSN, PHN

## 2020-09-03 NOTE — PROGRESS NOTES
S: 28 yo female is here for vaginal discharge and external discomfort for 3 days. Usually gets this with yeast infections.   8/13- UC no growth. 5/5 and 8/13 wet prep showed BV  Having vaginal issues since May after delivering her child.   No dysuria. No fever. Not breastfeeding.    No Known Allergies    Past Medical History:   Diagnosis Date     H/O colposcopy with cervical biopsy 05/10/2013    resolved     History of asthma 2008    exercise induced.  No symptoms since 2008.     LSIL (low grade squamous intraepithelial lesion) on Pap smear 3/19/13    resolved     Sickle cell trait (H)      Trichomonal vulvovaginitis 2012       acetaminophen (TYLENOL) 325 MG tablet, Take 325-650 mg by mouth every 6 hours as needed for mild pain  calcium carbonate (TUMS) 500 MG chewable tablet, Take 1 chew tab by mouth 2 times daily  ferrous sulfate (FEROSUL) 325 (65 Fe) MG tablet, Take 325 mg by mouth daily (with breakfast)  Prenatal Vit-Fe Fumarate-FA (PNV PRENATAL PLUS MULTIVITAMIN) 27-1 MG TABS per tablet, Take 1 tablet by mouth daily    No current facility-administered medications on file prior to visit.       Social History     Tobacco Use     Smoking status: Never Smoker     Smokeless tobacco: Never Used   Substance Use Topics     Alcohol use: Not Currently     Alcohol/week: 0.0 standard drinks     Comment: pregnant     Drug use: No       ROS:  General: negative for fever  ABD: Denies abd pain  : as above    OBJECTIVE:  /76   Pulse 82   Temp 98.1  F (36.7  C) (Oral)   Resp 16   Wt 67.7 kg (149 lb 4.8 oz)   LMP  (LMP Unknown)   SpO2 99%   BMI 24.84 kg/m      LMP  (LMP Unknown)    General:   awake, alert, and cooperative.  NAD.   Head: Normocephalic, atraumatic.  Eyes: Conjunctiva clear, non icteric.   Neuro: Alert and oriented - normal speech.   Results for orders placed or performed in visit on 09/03/20   Wet prep     Status: None    Specimen: Vagina   Result Value Ref Range    Specimen Description Vagina      Wet Prep No Trichomonas seen     Wet Prep No clue cells seen     Wet Prep No yeast seen     Wet Prep Moderate  WBC'S seen          ASSESSMENT:    ICD-10-CM    1. Vaginal discharge  N89.8 Wet prep     fluconazole (DIFLUCAN) 150 MG tablet         PLAN: Test not 100% accurate. She feels it is yeast. I will treat her. If no resolution see her OB/GYN.  As per ordered above.  Drink plenty of fluids.  Prevention and treatment of UTI's discussed. Follow up with primary care physician if not improving.  Advised about symptoms which might herald more serious problems.      Shanti Ray PA-C

## 2020-09-04 NOTE — TELEPHONE ENCOUNTER
Patient was seen on 9/3/2020 at .  Jessika Butcher MA  [facility]  2nd Floor  Primary Care

## 2020-09-19 DIAGNOSIS — B96.89 BV (BACTERIAL VAGINOSIS): ICD-10-CM

## 2020-09-19 DIAGNOSIS — N76.0 BV (BACTERIAL VAGINOSIS): ICD-10-CM

## 2020-09-21 RX ORDER — METRONIDAZOLE 500 MG/1
TABLET ORAL
Qty: 14 TABLET | Refills: 0 | OUTPATIENT
Start: 2020-09-21

## 2020-09-21 NOTE — TELEPHONE ENCOUNTER
Requested Prescriptions   Pending Prescriptions Disp Refills     metroNIDAZOLE (FLAGYL) 500 MG tablet [Pharmacy Med Name: METRONIDAZOLE 500 MG TABLET] 14 tablet 0     Sig: TAKE 1 TABLET BY MOUTH 2 TIMES DAILY FOR 7 DAYS       There is no refill protocol information for this order        Routing refill request to provider for review/approval because:  Drug not on the [facility] refill protocol   Drug not active on patient's medication list          Elizabeth Ansari RN, BSN, PHN

## 2020-09-22 ENCOUNTER — OFFICE VISIT (OUTPATIENT)
Dept: FAMILY MEDICINE | Facility: CLINIC | Age: 30
End: 2020-09-22
Payer: COMMERCIAL

## 2020-09-22 VITALS
HEART RATE: 72 BPM | WEIGHT: 139.2 LBS | OXYGEN SATURATION: 100 % | DIASTOLIC BLOOD PRESSURE: 82 MMHG | TEMPERATURE: 98.5 F | SYSTOLIC BLOOD PRESSURE: 126 MMHG | BODY MASS INDEX: 23.16 KG/M2

## 2020-09-22 DIAGNOSIS — R21 RASH: Primary | ICD-10-CM

## 2020-09-22 LAB
KOH PREP SPEC: NORMAL
SPECIMEN SOURCE: NORMAL

## 2020-09-22 PROCEDURE — 87220 TISSUE EXAM FOR FUNGI: CPT | Performed by: FAMILY MEDICINE

## 2020-09-22 PROCEDURE — 99213 OFFICE O/P EST LOW 20 MIN: CPT | Performed by: FAMILY MEDICINE

## 2020-09-22 RX ORDER — TRIAMCINOLONE ACETONIDE 1 MG/G
CREAM TOPICAL 2 TIMES DAILY
Qty: 80 G | Refills: 0 | Status: SHIPPED | OUTPATIENT
Start: 2020-09-22 | End: 2021-03-11

## 2020-09-22 ASSESSMENT — PAIN SCALES - GENERAL: PAINLEVEL: NO PAIN (0)

## 2020-09-22 NOTE — PROGRESS NOTES
"Subjective     Shwetha Issa is a 29 year old female who presents to clinic today for the following health issues:    HPI       Rash  Onset/Duration: 3 weeks  Description  Location: breast, arm,back  Character: round, raised, flakey, red  Itching: mild  Intensity:  mild  Progression of Symptoms:  worsening  Accompanying signs and symptoms:   Fever: no  Body aches or joint pain: no  Sore throat symptoms: no  Recent cold symptoms: no  History:           Previous episodes of similar rash: None  New exposures:  None  Recent travel: no  Exposure to similar rash: no  Precipitating or alleviating factors: none  Therapies tried and outcome: lotrimin.   Use shea butter as moisturizer    Lesion was getting smaller with lotrimin. Used inconsistently and now restarted more regularly. However, now noting spots on arms and back.     Spots on backs and arms mildly itching.     Thinks had similar rash/ringworm in elementary school    LMP last week- only sex one time since delivery. Used condoms. 5.5 months post-partum.     Last asthma sx's 15 years ago. Had exercise induced sx's previously. No inhaler use for many, many years    Using \"black soap\" to help her acne.   Started back up 2 mo ago.         Review of Systems   Constitutional, HEENT, cardiovascular, pulmonary, gi and gu systems are negative, except as otherwise noted.      Objective    /82 (BP Location: Left arm, Patient Position: Chair, Cuff Size: Adult Regular)   Pulse 72   Temp 98.5  F (36.9  C) (Oral)   Wt 63.1 kg (139 lb 3.2 oz)   LMP 09/07/2020 (Exact Date)   SpO2 100%   BMI 23.16 kg/m    Body mass index is 23.16 kg/m .  Physical Exam   GENERAL: healthy, alert and no distress  MS: no gross musculoskeletal defects noted, no edema  SKIN: no suspicious lesions. Annual scaling hyperpigmented patches (5 mm- 2 cm) with central clearing, left breast, few on arms and scattered on back.     Results for orders placed or performed in visit on 09/22/20   KOH prep " (skin, hair or nails only)     Status: None    Specimen: Back   Result Value Ref Range    Specimen Description Back     KOH Skin Hair Nails Test No fungal elements seen            Assessment & Plan     Rash  Some improvement but no resolution with topical lotrimin. Now with expanding rash on back/arms. ? Eczema vs tinea coporis/versicolor vs granuloma annulare vs other  Will trial adding on triamcinolone to lotrimin. Patient to update on course after one week. Consider derm referral if persistent.   - KOH prep (skin, hair or nails only)  - triamcinolone (KENALOG) 0.1 % external cream; Apply topically 2 times daily     Declines flu vaccine    Return in about 1 week (around 9/29/2020), or if symptoms worsen or fail to improve.    Ginger Apple MD  [facility]

## 2020-09-22 NOTE — TELEPHONE ENCOUNTER
Patient saw Dr Quevedo today, 9/22/2020.  Jessika Butcher MA  [facility]  2nd Floor  Primary Care

## 2020-09-22 NOTE — PATIENT INSTRUCTIONS
At [facility], we strive to deliver an exceptional experience to you, every time we see you. If you receive a survey, please complete it as we do value your feedback.  If you have MyChart, you can expect to receive results automatically within 24 hours of their completion.  Your provider will send a note interpreting your results as well.   If you do not have MyChart, you should receive your results in about a week by mail.    Your care team:                            Family Medicine Internal Medicine   MD Yo Armenta MD Shantel Branch-Fleming, MD Srinivasa Vaka, MD Katya Georgiev PA-C Megan Hill, APRN CNP    Deshaun Wiseman, MD Pediatrics   Steve Almendarez, PAJavanC  Kendra Hopkins, CNP MD Nichole Herndon APRN CNP   MD Emma Saeed MD Deborah Mielke, MD Dottie Benites, APRN CNP  Melissa Wilson, PAJavanC  Stephanie Napier, CNP  MD Jennifer Sahni MD Angela Wermerskirchen, MD      Clinic hours: Monday - Thursday 7 am-7 pm; Fridays 7 am-5 pm.   Urgent care: Monday - Friday 11 am-9 pm; Saturday and Sunday 9 am-5 pm.    Clinic: [phone]       [facility]: Monday - Thursday 8 am - 7 pm; Friday 8 am - 6 pm  [facility]: [phone]     Use www.[email or web address] for 24/7 diagnosis and treatment of dozens of conditions.

## 2020-11-29 ENCOUNTER — OFFICE VISIT (OUTPATIENT)
Dept: URGENT CARE | Facility: URGENT CARE | Age: 30
End: 2020-11-29
Payer: COMMERCIAL

## 2020-11-29 VITALS
HEART RATE: 90 BPM | DIASTOLIC BLOOD PRESSURE: 85 MMHG | BODY MASS INDEX: 22.8 KG/M2 | SYSTOLIC BLOOD PRESSURE: 129 MMHG | OXYGEN SATURATION: 99 % | TEMPERATURE: 98.5 F | RESPIRATION RATE: 16 BRPM | WEIGHT: 137 LBS

## 2020-11-29 DIAGNOSIS — B96.89 BACTERIAL VAGINAL INFECTION: ICD-10-CM

## 2020-11-29 DIAGNOSIS — N89.8 VAGINAL DISCHARGE: Primary | ICD-10-CM

## 2020-11-29 DIAGNOSIS — N76.0 BACTERIAL VAGINAL INFECTION: ICD-10-CM

## 2020-11-29 LAB
SPECIMEN SOURCE: ABNORMAL
WET PREP SPEC: ABNORMAL

## 2020-11-29 PROCEDURE — 87210 SMEAR WET MOUNT SALINE/INK: CPT | Performed by: FAMILY MEDICINE

## 2020-11-29 PROCEDURE — 99213 OFFICE O/P EST LOW 20 MIN: CPT | Performed by: FAMILY MEDICINE

## 2020-11-29 RX ORDER — METRONIDAZOLE 500 MG/1
500 TABLET ORAL 2 TIMES DAILY
Qty: 14 TABLET | Refills: 0 | Status: SHIPPED | OUTPATIENT
Start: 2020-11-29 | End: 2020-12-06

## 2020-11-29 ASSESSMENT — ENCOUNTER SYMPTOMS
SORE THROAT: 0
NAUSEA: 0
SHORTNESS OF BREATH: 0
DIFFICULTY URINATING: 0
VOMITING: 0
PALPITATIONS: 0
ABDOMINAL PAIN: 0
CHILLS: 0
HEMATURIA: 0
FEVER: 0
WOUND: 0
FLANK PAIN: 0
DIZZINESS: 0
BLOOD IN STOOL: 0
FREQUENCY: 0
DIARRHEA: 0
PSYCHIATRIC NEGATIVE: 1
DYSURIA: 0
COUGH: 0
HEADACHES: 0
RHINORRHEA: 0

## 2020-11-29 NOTE — PROGRESS NOTES
SUBJECTIVE:   Shwetha Issa is a 30 year old female presenting with a chief complaint of   Chief Complaint   Patient presents with     Vaginal Problem     Vaginal discharge, odor, and irritation started about 3 days ago.       She is an established patient of [facility].      30-year-old female presenting with vaginal discharge odor and irritation over the past 3 days.  Vaginal symptoms scribed as mild clear discharge with a fishy odor and mild irritation.  She has not been sexually active for at least a few months.  Denies any prior history of concern for STD or recent concern.  Does not use any feminine hygiene products or sprays and has been using the same body soap for years.      Denies any past medical history does not take any medications.    Review of Systems   Constitutional: Negative for chills and fever.   HENT: Negative for congestion, ear pain, rhinorrhea and sore throat.    Respiratory: Negative for cough and shortness of breath.    Cardiovascular: Negative for chest pain and palpitations.   Gastrointestinal: Negative for abdominal pain, blood in stool, diarrhea, nausea and vomiting.   Genitourinary: Positive for vaginal discharge (fishy odor with off white color and scant amount over the past 2 days ). Negative for decreased urine volume, difficulty urinating, dyspareunia, dysuria, enuresis, flank pain, frequency, genital sores, hematuria, menstrual problem (finshed 3 days ago ), pelvic pain, urgency, vaginal bleeding and vaginal pain.   Skin: Negative for rash and wound.   Allergic/Immunologic: Negative for food allergies.   Neurological: Negative for dizziness and headaches.   Psychiatric/Behavioral: Negative.        Past Medical History:   Diagnosis Date     H/O colposcopy with cervical biopsy 05/10/2013    resolved     History of asthma 2008    exercise induced.  No symptoms since 2008.     Intermittent asthma 6/11/2014     LSIL (low grade squamous intraepithelial lesion) on Pap smear 3/19/13     resolved     Sickle cell trait (H)      Trichomonal vulvovaginitis 2012     Family History   Problem Relation Age of Onset     Cardiovascular Other         Hypertrophic cardiomyopathy in cousin,  in .  Shwetha had normal Echo in .     Thrombophilia Other         sickle cell     Diabetes Other      Hypertension Other      Gastrointestinal Disease Other         peptic ulcers     Blood Disease Maternal Grandfather         sickle cell disease     Blood Disease Mother         sickle cell trait     Cerebrovascular Disease Maternal Grandmother      Cerebrovascular Disease Paternal Grandmother      Current Outpatient Medications   Medication Sig Dispense Refill     acetaminophen (TYLENOL) 325 MG tablet Take 325-650 mg by mouth every 6 hours as needed for mild pain       calcium carbonate (TUMS) 500 MG chewable tablet Take 1 chew tab by mouth 2 times daily       ferrous sulfate (FEROSUL) 325 (65 Fe) MG tablet Take 325 mg by mouth daily (with breakfast)       Prenatal Vit-Fe Fumarate-FA (PNV PRENATAL PLUS MULTIVITAMIN) 27-1 MG TABS per tablet Take 1 tablet by mouth daily       triamcinolone (KENALOG) 0.1 % external cream Apply topically 2 times daily (Patient not taking: Reported on 2020) 80 g 0     Social History     Tobacco Use     Smoking status: Never Smoker     Smokeless tobacco: Never Used   Substance Use Topics     Alcohol use: Not Currently     Alcohol/week: 0.0 standard drinks     Comment: pregnant       OBJECTIVE  /85 (BP Location: Left arm, Patient Position: Sitting, Cuff Size: Adult Small)   Pulse 90   Temp 98.5  F (36.9  C) (Tympanic)   Resp 16   Wt 62.1 kg (137 lb)   LMP 2020   SpO2 99%   Breastfeeding No   BMI 22.80 kg/m        Physical Exam  HENT:      Head: Normocephalic and atraumatic.      Right Ear: External ear normal.      Left Ear: External ear normal.      Nose: Nose normal.      Mouth/Throat:      Pharynx: No oropharyngeal exudate.   Eyes:      General: No scleral  icterus.        Right eye: No discharge.         Left eye: No discharge.      Conjunctiva/sclera: Conjunctivae normal.      Pupils: Pupils are equal, round, and reactive to light.   Neck:      Musculoskeletal: Neck supple.      Thyroid: No thyromegaly.      Trachea: No tracheal deviation.   Cardiovascular:      Rate and Rhythm: Normal rate and regular rhythm.      Heart sounds: Normal heart sounds. No murmur. No friction rub. No gallop.    Pulmonary:      Effort: Pulmonary effort is normal. No respiratory distress.      Breath sounds: Normal breath sounds. No stridor. No wheezing or rales.   Chest:      Chest wall: No tenderness.   Abdominal:      General: Bowel sounds are normal. There is no distension.      Palpations: Abdomen is soft. There is no mass.      Tenderness: There is no abdominal tenderness. There is no guarding or rebound.   Musculoskeletal:         General: No tenderness or deformity.   Lymphadenopathy:      Cervical: No cervical adenopathy.   Skin:     General: Skin is warm and dry.      Findings: No erythema or rash.   Neurological:      Mental Status: She is alert and oriented to person, place, and time.      Cranial Nerves: No cranial nerve deficit.   Psychiatric:         Judgment: Judgment normal.             Results for orders placed or performed in visit on 11/29/20   Wet prep     Status: Abnormal    Specimen: Vagina   Result Value Ref Range    Specimen Description Vagina     Wet Prep No Trichomonas seen     Wet Prep Moderate  Clue cells seen   (A)     Wet Prep No yeast seen     Wet Prep Few  WBC'S seen             ASSESSMENT:    ICD-10-CM    1. Vaginal discharge  N89.8 Wet prep   2. Bacterial vaginal infection  N76.0 metroNIDAZOLE (FLAGYL) 500 MG tablet    B96.89         PLAN:  Prevention of bacterial vaginosis was emphasized  Treatment metronidazole as above side effects were also described.  Encourage follow-up with OB/GYN for any further recommendations when increasing frequency of  involvement.  The patient indicates understanding of these issues and agrees with the plan.   Satya Patel MD

## 2020-12-06 ENCOUNTER — HEALTH MAINTENANCE LETTER (OUTPATIENT)
Age: 30
End: 2020-12-06

## 2020-12-31 ENCOUNTER — OFFICE VISIT (OUTPATIENT)
Dept: URGENT CARE | Facility: URGENT CARE | Age: 30
End: 2020-12-31
Payer: COMMERCIAL

## 2020-12-31 VITALS
DIASTOLIC BLOOD PRESSURE: 77 MMHG | WEIGHT: 141 LBS | TEMPERATURE: 97.2 F | OXYGEN SATURATION: 99 % | RESPIRATION RATE: 14 BRPM | SYSTOLIC BLOOD PRESSURE: 120 MMHG | HEART RATE: 77 BPM | BODY MASS INDEX: 23.46 KG/M2

## 2020-12-31 DIAGNOSIS — N76.0 BV (BACTERIAL VAGINOSIS): Primary | ICD-10-CM

## 2020-12-31 DIAGNOSIS — B96.89 BV (BACTERIAL VAGINOSIS): Primary | ICD-10-CM

## 2020-12-31 LAB
SPECIMEN SOURCE: ABNORMAL
WET PREP SPEC: ABNORMAL

## 2020-12-31 PROCEDURE — 99213 OFFICE O/P EST LOW 20 MIN: CPT | Performed by: PHYSICIAN ASSISTANT

## 2020-12-31 PROCEDURE — 87210 SMEAR WET MOUNT SALINE/INK: CPT | Performed by: PHYSICIAN ASSISTANT

## 2020-12-31 RX ORDER — FLUCONAZOLE 150 MG/1
150 TABLET ORAL ONCE
Qty: 1 TABLET | Refills: 0 | Status: SHIPPED | OUTPATIENT
Start: 2020-12-31 | End: 2020-12-31

## 2020-12-31 RX ORDER — METRONIDAZOLE 500 MG/1
500 TABLET ORAL 2 TIMES DAILY
Qty: 14 TABLET | Refills: 0 | Status: SHIPPED | OUTPATIENT
Start: 2020-12-31 | End: 2021-01-07

## 2020-12-31 ASSESSMENT — ENCOUNTER SYMPTOMS
CHEST TIGHTNESS: 0
WHEEZING: 0
DIARRHEA: 0
CARDIOVASCULAR NEGATIVE: 1
CONSTIPATION: 0
PALPITATIONS: 0
HEMATURIA: 0
FEVER: 0
COUGH: 0
ABDOMINAL PAIN: 0
NAUSEA: 0
HEARTBURN: 0
SHORTNESS OF BREATH: 0
DYSURIA: 0
FATIGUE: 0
FLANK PAIN: 0
GASTROINTESTINAL NEGATIVE: 1
CHILLS: 0
RESPIRATORY NEGATIVE: 1
FREQUENCY: 0
VOMITING: 0
HEMATOCHEZIA: 0

## 2020-12-31 NOTE — PROGRESS NOTES
Subjective   Shwetha Issa is a 30 year old female who presents to clinic today for the following health issues:  HPI   Vaginal Symptoms  Onset/Duration: 5days  Description:  Vaginal Discharge: white clear   Itching (Pruritis): YES, mild  Burning sensation:  no  Odor: YES  Accompanying Signs & Symptoms:  Urinary symptoms: no  Abdominal pain: no  Fever: no  History:   Sexually active: YES  New Partner: no  Possibility of Pregnancy:  no  Recent antibiotic use: YES- 2weeks ago for BV  Previous vaginitis issues: YES- recent BV infection  Precipitating or alleviating factors: used condoms recently  Therapies tried and outcome: none      Patient Active Problem List   Diagnosis     CARDIOVASCULAR SCREENING; LDL GOAL LESS THAN 160     Sickle cell trait (H)     Current Outpatient Medications   Medication     acetaminophen (TYLENOL) 325 MG tablet     calcium carbonate (TUMS) 500 MG chewable tablet     ferrous sulfate (FEROSUL) 325 (65 Fe) MG tablet     Prenatal Vit-Fe Fumarate-FA (PNV PRENATAL PLUS MULTIVITAMIN) 27-1 MG TABS per tablet     triamcinolone (KENALOG) 0.1 % external cream     No current facility-administered medications for this visit.       No Known Allergies    Review of Systems   Constitutional: Negative for chills, fatigue and fever.   Respiratory: Negative.  Negative for cough, chest tightness, shortness of breath and wheezing.    Cardiovascular: Negative.  Negative for chest pain, palpitations and peripheral edema.   Gastrointestinal: Negative.  Negative for abdominal pain, constipation, diarrhea, heartburn, hematochezia, nausea and vomiting.   Genitourinary: Positive for vaginal discharge. Negative for dysuria, flank pain, frequency, hematuria, pelvic pain, urgency and vaginal bleeding.   All other systems reviewed and are negative.           Objective    /77 (BP Location: Left arm, Patient Position: Sitting, Cuff Size: Adult Regular)   Pulse 77   Temp 97.2  F (36.2  C) (Tympanic)   Resp 14   Wt  64 kg (141 lb)   LMP 12/18/2020   SpO2 99%   BMI 23.46 kg/m    Body mass index is 23.46 kg/m .  Physical Exam  Vitals signs and nursing note reviewed.   Constitutional:       General: She is not in acute distress.     Appearance: Normal appearance. She is well-developed and normal weight. She is not ill-appearing.   Cardiovascular:      Rate and Rhythm: Normal rate and regular rhythm.      Pulses: Normal pulses.      Heart sounds: Normal heart sounds, S1 normal and S2 normal. No murmur. No friction rub. No gallop.    Pulmonary:      Effort: Pulmonary effort is normal. No accessory muscle usage or respiratory distress.      Breath sounds: Normal breath sounds and air entry. No decreased breath sounds, wheezing, rhonchi or rales.   Abdominal:      General: Abdomen is flat. Bowel sounds are normal.      Palpations: Abdomen is soft. There is no hepatomegaly, splenomegaly or mass.      Tenderness: There is no abdominal tenderness. There is no right CVA tenderness, left CVA tenderness, guarding or rebound. Negative signs include Fallon's sign, Rovsing's sign, McBurney's sign, psoas sign and obturator sign.      Hernia: No hernia is present.   Genitourinary:     Comments: Declined pelvic exam.  Skin:     General: Skin is warm and dry.   Neurological:      Mental Status: She is alert and oriented to person, place, and time.   Psychiatric:         Mood and Affect: Mood normal.         Behavior: Behavior normal.         Thought Content: Thought content normal.         Judgment: Judgment normal.       Results for orders placed or performed in visit on 12/31/20 (from the past 24 hour(s))   Wet prep    Specimen: Vagina   Result Value Ref Range    Specimen Description Vagina     Wet Prep Few  Clue cells seen   (A)     Wet Prep No yeast seen     Wet Prep No Trichomonas seen     Wet Prep Few  WBC'S seen              Assessment & Plan   BV (bacterial vaginosis):  Wet prep showed clue cells present.  She declined STD testing.   Will treat with metronidazole X1week and also give diflucan poX1 for possible yeast infection.  No ETOH while on medications due to disulfuram reaction.  Avoid foreign body insertion, scented personal care products and frequent baths.  Recheck in clinic if symptoms worsen or if symptoms do not improve.    -     Wet prep  -     metroNIDAZOLE (FLAGYL) 500 MG tablet; Take 1 tablet (500 mg) by mouth 2 times daily for 7 days  -     fluconazole (DIFLUCAN) 150 MG tablet; Take 1 tablet (150 mg) by mouth once for 1 dose            Anamaria Vogt PA-C  [facility]

## 2021-01-08 ENCOUNTER — E-VISIT (OUTPATIENT)
Dept: URGENT CARE | Facility: URGENT CARE | Age: 31
End: 2021-01-08
Payer: COMMERCIAL

## 2021-01-08 DIAGNOSIS — B96.89 BACTERIAL VAGINOSIS: ICD-10-CM

## 2021-01-08 DIAGNOSIS — N76.0 BACTERIAL VAGINOSIS: ICD-10-CM

## 2021-01-08 DIAGNOSIS — N76.0 ACUTE VAGINITIS: Primary | ICD-10-CM

## 2021-01-08 DIAGNOSIS — B37.31 CANDIDAL VULVOVAGINITIS: ICD-10-CM

## 2021-01-08 PROCEDURE — 99421 OL DIG E/M SVC 5-10 MIN: CPT | Performed by: FAMILY MEDICINE

## 2021-01-08 RX ORDER — METRONIDAZOLE 500 MG/1
500 TABLET ORAL 2 TIMES DAILY
Qty: 14 TABLET | Refills: 0 | Status: SHIPPED | OUTPATIENT
Start: 2021-01-08 | End: 2021-01-15

## 2021-01-08 RX ORDER — FLUCONAZOLE 150 MG/1
150 TABLET ORAL ONCE
Qty: 1 TABLET | Refills: 0 | Status: SHIPPED | OUTPATIENT
Start: 2021-01-08 | End: 2021-01-08

## 2021-01-08 NOTE — PATIENT INSTRUCTIONS
Thank you for choosing us for your care. I have placed an order for a prescription so that you can start treatment. View your full visit summary for details by clicking on the link below. Your pharmacist will able to address any questions you may have about the medication.     If you re not feeling better within 2-3 days, please schedule an appointment.  You can schedule an appointment right here in [facility], or call [phone]  If the visit is for the same symptoms as your eVisit, we ll refund the cost of your eVisit if seen within seven days.      Yeast Infection (Candida Vaginal Infection)    You have a Candida vaginal infection. This is also known as a yeast infection. It is most often caused by a type of yeast (fungus) called Candida. Candida are normally found in the vagina. But if they increase in number, this can lead to infection and cause symptoms.  Symptoms of a yeast infection can include:    Clumpy or thin, white discharge, which may look like cottage cheese    Itching or burning    Burning with urination  Certain factors can make a yeast infection more likely. These can include:    Taking certain medicines, such as antibiotics or birth control pills    Pregnancy    Diabetes    Weak immune system  A yeast infection is most often treated with antifungal medicine. This may be given as a vaginal cream or pills you take by mouth. Treatment may last for about 1 to 7 days. Women with severe or recurrent infections may need longer courses of treatment.  Home care    If you re prescribed medicine, be sure to use it as directed. Finish all of the medicine, even if your symptoms go away. Note: Don t try to treat yourself using over-the-counter products without talking to your provider first. He or she will let you know if this is a good option for you.    Ask your provider what steps you can take to help reduce your risk of having a yeast infection in the future.    Follow-up care  Follow up with your  healthcare provider, or as directed.  When to seek medical advice  Call your healthcare provider right away if:    You have a fever of 100.4 F (38 C) or higher, or as directed by your provider.    Your symptoms worsen, or they don t go away within a few days of starting treatment.    You have new pain in the lower belly or pelvic region.    You have side effects that bother you or a reaction to the cream or pills you re prescribed.    You or any partners you have sex with have new symptoms, such as a rash, joint pain, or sores.  Revision3 last reviewed this educational content on 10/1/2017    5160-2493 [employer] ([employer]. [address] [place] [address], [place] [address]. All rights reserved. This information is not intended as a substitute for professional medical care. Always follow your healthcare professional's instructions.

## 2021-01-10 ENCOUNTER — OFFICE VISIT (OUTPATIENT)
Dept: URGENT CARE | Facility: URGENT CARE | Age: 31
End: 2021-01-10
Payer: COMMERCIAL

## 2021-01-10 VITALS
RESPIRATION RATE: 14 BRPM | SYSTOLIC BLOOD PRESSURE: 121 MMHG | DIASTOLIC BLOOD PRESSURE: 81 MMHG | OXYGEN SATURATION: 100 % | BODY MASS INDEX: 23.46 KG/M2 | HEART RATE: 85 BPM | TEMPERATURE: 98.1 F | WEIGHT: 141 LBS

## 2021-01-10 DIAGNOSIS — N76.0 BV (BACTERIAL VAGINOSIS): Primary | ICD-10-CM

## 2021-01-10 DIAGNOSIS — B96.89 BV (BACTERIAL VAGINOSIS): Primary | ICD-10-CM

## 2021-01-10 DIAGNOSIS — N89.8 VAGINAL DISCHARGE: ICD-10-CM

## 2021-01-10 DIAGNOSIS — R30.0 DYSURIA: ICD-10-CM

## 2021-01-10 LAB
ALBUMIN UR-MCNC: NEGATIVE MG/DL
APPEARANCE UR: ABNORMAL
BACTERIA #/AREA URNS HPF: ABNORMAL /HPF
BILIRUB UR QL STRIP: NEGATIVE
COLOR UR AUTO: YELLOW
GLUCOSE UR STRIP-MCNC: NEGATIVE MG/DL
HGB UR QL STRIP: NEGATIVE
KETONES UR STRIP-MCNC: NEGATIVE MG/DL
LEUKOCYTE ESTERASE UR QL STRIP: ABNORMAL
NITRATE UR QL: NEGATIVE
NON-SQ EPI CELLS #/AREA URNS LPF: ABNORMAL /LPF
PH UR STRIP: 7 PH (ref 5–7)
RBC #/AREA URNS AUTO: ABNORMAL /HPF
SOURCE: ABNORMAL
SP GR UR STRIP: 1.02 (ref 1–1.03)
SPECIMEN SOURCE: ABNORMAL
UROBILINOGEN UR STRIP-ACNC: 0.2 EU/DL (ref 0.2–1)
WBC #/AREA URNS AUTO: ABNORMAL /HPF
WET PREP SPEC: ABNORMAL

## 2021-01-10 PROCEDURE — 87210 SMEAR WET MOUNT SALINE/INK: CPT | Performed by: PHYSICIAN ASSISTANT

## 2021-01-10 PROCEDURE — 81001 URINALYSIS AUTO W/SCOPE: CPT | Performed by: PHYSICIAN ASSISTANT

## 2021-01-10 PROCEDURE — 87086 URINE CULTURE/COLONY COUNT: CPT | Performed by: PHYSICIAN ASSISTANT

## 2021-01-10 PROCEDURE — 99213 OFFICE O/P EST LOW 20 MIN: CPT | Performed by: PHYSICIAN ASSISTANT

## 2021-01-10 RX ORDER — METRONIDAZOLE 7.5 MG/G
1 GEL VAGINAL DAILY
Qty: 35 G | Refills: 0 | Status: SHIPPED | OUTPATIENT
Start: 2021-01-10 | End: 2021-01-17

## 2021-01-10 RX ORDER — METRONIDAZOLE 500 MG/1
500 TABLET ORAL 2 TIMES DAILY
Qty: 14 TABLET | Refills: 0 | Status: SHIPPED | OUTPATIENT
Start: 2021-01-10 | End: 2021-01-17

## 2021-01-10 ASSESSMENT — ENCOUNTER SYMPTOMS
RHINORRHEA: 0
NECK PAIN: 0
HEMATURIA: 0
CHILLS: 0
LIGHT-HEADEDNESS: 0
VOMITING: 0
FREQUENCY: 0
NEUROLOGICAL NEGATIVE: 1
NAUSEA: 0
SHORTNESS OF BREATH: 0
DYSURIA: 1
NECK STIFFNESS: 0
RESPIRATORY NEGATIVE: 1
FLANK PAIN: 0
HEADACHES: 0
CONSTITUTIONAL NEGATIVE: 1
PALPITATIONS: 0
ACTIVITY CHANGE: 0
POLYDIPSIA: 0
ENDOCRINE NEGATIVE: 1
COUGH: 0
ADENOPATHY: 0
DIARRHEA: 0
MYALGIAS: 0
FEVER: 0
CARDIOVASCULAR NEGATIVE: 1
SORE THROAT: 0
FATIGUE: 0
DIZZINESS: 0
MUSCULOSKELETAL NEGATIVE: 1
WEAKNESS: 0
GASTROINTESTINAL NEGATIVE: 1
ABDOMINAL PAIN: 0

## 2021-01-10 NOTE — PROGRESS NOTES
Chief Complaint:    Chief Complaint   Patient presents with     Vaginal Problem     Was seen about a week ago, was put on anitbiotic for 7 days. Still having discharge, and odor, not charges in symptoms.         ASSESSMENT     1. BV (bacterial vaginosis)    2. Dysuria    3. Vaginal discharge           PLAN    Urinalysis discussed with patient.  This was unremarkable for UTI.  We will call with culture results only if positive for growth.  Wet prep was positive for clue cells.   Rx for Flagyl today.  With recurrent BV, RX for Metrogel today per patient request.  Patient understands that she is not to use this until after she has completed the Flagyl.  Treatment currentguidelines - also push fluids.   Follow up with OB/GYN in 2-3 days if symptoms are not improving.  Worrisome symptoms discussed with instructions to go to the ED.  Patient verbalized understanding and agreed with this plan.    Labs:     Results for orders placed or performed in visit on 01/10/21   UA with Microscopic reflex to Culture     Status: Abnormal    Specimen: Midstream Urine   Result Value Ref Range    Color Urine Yellow     Appearance Urine Slightly Cloudy     Glucose Urine Negative NEG^Negative mg/dL    Bilirubin Urine Negative NEG^Negative    Ketones Urine Negative NEG^Negative mg/dL    Specific Gravity Urine 1.020 1.003 - 1.035    pH Urine 7.0 5.0 - 7.0 pH    Protein Albumin Urine Negative NEG^Negative mg/dL    Urobilinogen Urine 0.2 0.2 - 1.0 EU/dL    Nitrite Urine Negative NEG^Negative    Blood Urine Negative NEG^Negative    Leukocyte Esterase Urine Small (A) NEG^Negative    Source Midstream Urine     WBC Urine 10-25 (A) OTO5^0 - 5 /HPF    RBC Urine O - 2 OTO2^O - 2 /HPF    Squamous Epithelial /LPF Urine Many (A) FEW^Few /LPF    Bacteria Urine Many (A) NEG^Negative /HPF   Wet prep     Status: Abnormal    Specimen: Vagina   Result Value Ref Range    Specimen Description Vagina     Wet Prep No Trichomonas seen     Wet Prep Moderate  Clue  cells seen   (A)     Wet Prep No yeast seen     Wet Prep Few  WBC'S seen          Problem history    Patient Active Problem List   Diagnosis     CARDIOVASCULAR SCREENING; LDL GOAL LESS THAN 160     Sickle cell trait (H)       Current Meds    Current Outpatient Medications:      acetaminophen (TYLENOL) 325 MG tablet, Take 325-650 mg by mouth every 6 hours as needed for mild pain, Disp: , Rfl:      ferrous sulfate (FEROSUL) 325 (65 Fe) MG tablet, Take 325 mg by mouth daily (with breakfast), Disp: , Rfl:      metroNIDAZOLE (METROGEL) 0.75 % vaginal gel, Place 1 applicator (5 g) vaginally daily for 7 days, Disp: 35 g, Rfl: 0     metroNIDAZOLE (FLAGYL) 500 MG tablet, Take 1 tablet (500 mg) by mouth 2 times daily for 7 days (Patient not taking: Reported on 1/10/2021), Disp: 14 tablet, Rfl: 0     triamcinolone (KENALOG) 0.1 % external cream, Apply topically 2 times daily (Patient not taking: Reported on 11/29/2020), Disp: 80 g, Rfl: 0    Allergies  No Known Allergies    SUBJECTIVE    HPI:  Shwetha Issa is a 30 year old female who has symptoms of urinary dysuria, vaginal odor and vaginal discharge for 1 week(s).  she denies suprapubic pain and pressure, itching, back pain, nausea, vomiting, fever and chills, flank pain.  Patient was seen roughly 1 week ago and treated for BV with oral Flagyl. She reports typically her symptoms resolve 3-4 days after starting Metronidazole but this time the vaginal odor and discharge persisted. She did an e-visit and described these symptoms and was prescribed oral fluconazole which she took 2 days ago without improvement. She notes having sex for the first time in awhile with her partner and using a condom, which she thinks may have caused her to get BV.  Patient denies any new soaps, detergents, or vaginal washes.      She is not concerned about STDs and declined testing for this today.    ROS:      Review of Systems   Constitutional: Negative.  Negative for activity change, chills,  fatigue and fever.   HENT: Negative for congestion, ear pain, rhinorrhea and sore throat.    Respiratory: Negative.  Negative for cough and shortness of breath.    Cardiovascular: Negative.  Negative for chest pain and palpitations.   Gastrointestinal: Negative.  Negative for abdominal pain, diarrhea, nausea and vomiting.   Endocrine: Negative.  Negative for polydipsia and polyuria.   Genitourinary: Positive for dysuria and vaginal discharge. Negative for flank pain, frequency, hematuria, pelvic pain, urgency and vaginal pain.   Musculoskeletal: Negative.  Negative for myalgias, neck pain and neck stiffness.   Allergic/Immunologic: Negative for immunocompromised state.   Neurological: Negative.  Negative for dizziness, weakness, light-headedness and headaches.   Hematological: Negative for adenopathy.       Family History   Family History   Problem Relation Age of Onset     Cardiovascular Other         Hypertrophic cardiomyopathy in cousin,  in .  Shwetha had normal Echo in .     Thrombophilia Other         sickle cell     Diabetes Other      Hypertension Other      Gastrointestinal Disease Other         peptic ulcers     Blood Disease Maternal Grandfather         sickle cell disease     Blood Disease Mother         sickle cell trait     Cerebrovascular Disease Maternal Grandmother      Cerebrovascular Disease Paternal Grandmother         Social History  Social History     Socioeconomic History     Marital status:      Spouse name: Aby     Number of children: 2     Years of education: Not on file     Highest education level: Not on file   Occupational History     Employer: [employer]     Occupation: /advertising work     Comment: [employer]     Occupation: , sales, [employer]     Comment: [employer]   Social Needs     Financial resource strain: Not on file     Food insecurity     Worry: Not on file     Inability: Not on file     Transportation needs     Medical: Not on file      Non-medical: Not on file   Tobacco Use     Smoking status: Never Smoker     Smokeless tobacco: Never Used   Substance and Sexual Activity     Alcohol use: Not Currently     Alcohol/week: 0.0 standard drinks     Comment: pregnant     Drug use: No     Sexual activity: Yes     Partners: Male     Birth control/protection: None     Comment: pregnant   Lifestyle     Physical activity     Days per week: Not on file     Minutes per session: Not on file     Stress: Not on file   Relationships     Social connections     Talks on phone: Not on file     Gets together: Not on file     Attends [employer] service: Not on file     Active member of club or organization: Not on file     Attends meetings of clubs or organizations: Not on file     Relationship status: Not on file     Intimate partner violence     Fear of current or ex partner: Not on file     Emotionally abused: Not on file     Physically abused: Not on file     Forced sexual activity: Not on file   Other Topics Concern     Parent/sibling w/ CABG, MI or angioplasty before 65F 55M? Not Asked   Social History Narrative     Not on file           OBJECTIVE     Vital signs noted and reviewed by Robbin Mora PA-C  /81 (BP Location: Left arm, Patient Position: Sitting, Cuff Size: Adult Regular)   Pulse 85   Temp 98.1  F (36.7  C) (Tympanic)   Resp 14   Wt 64 kg (141 lb)   LMP 12/18/2020   SpO2 100%   Breastfeeding No   BMI 23.46 kg/m       Physical Exam  Vitals signs and nursing note reviewed.   Constitutional:       General: She is not in acute distress.     Appearance: Normal appearance. She is well-developed. She is not ill-appearing, toxic-appearing or diaphoretic.   HENT:      Head: Normocephalic and atraumatic.      Right Ear: Tympanic membrane and external ear normal.      Left Ear: Tympanic membrane and external ear normal.   Eyes:      Pupils: Pupils are equal, round, and reactive to light.   Neck:      Musculoskeletal: Normal range of motion and  neck supple.   Cardiovascular:      Rate and Rhythm: Normal rate and regular rhythm.      Heart sounds: Normal heart sounds. No murmur. No friction rub. No gallop.    Pulmonary:      Effort: Pulmonary effort is normal. No respiratory distress.      Breath sounds: Normal breath sounds. No wheezing or rales.   Chest:      Chest wall: No tenderness.   Abdominal:      General: Bowel sounds are normal. There is no distension.      Palpations: Abdomen is soft. Abdomen is not rigid. There is no mass.      Tenderness: There is no abdominal tenderness. There is no guarding or rebound. Negative signs include Fallon's sign and McBurney's sign.   Lymphadenopathy:      Cervical: No cervical adenopathy.   Skin:     General: Skin is warm and dry.   Neurological:      Mental Status: She is alert and oriented to person, place, and time.      Cranial Nerves: No cranial nerve deficit.      Deep Tendon Reflexes: Reflexes are normal and symmetric.   Psychiatric:         Behavior: Behavior normal. Behavior is cooperative.         Thought Content: Thought content normal.         Judgment: Judgment normal.                     Robbin Mora PA-C  1/10/2021, 10:20 AM

## 2021-01-12 LAB
BACTERIA SPEC CULT: NORMAL
Lab: NORMAL
SPECIMEN SOURCE: NORMAL

## 2021-01-18 ENCOUNTER — ANCILLARY PROCEDURE (OUTPATIENT)
Dept: GENERAL RADIOLOGY | Facility: CLINIC | Age: 31
End: 2021-01-18
Attending: NURSE PRACTITIONER
Payer: COMMERCIAL

## 2021-01-18 ENCOUNTER — OFFICE VISIT (OUTPATIENT)
Dept: FAMILY MEDICINE | Facility: CLINIC | Age: 31
End: 2021-01-18
Payer: COMMERCIAL

## 2021-01-18 VITALS
DIASTOLIC BLOOD PRESSURE: 70 MMHG | SYSTOLIC BLOOD PRESSURE: 108 MMHG | TEMPERATURE: 97.4 F | HEIGHT: 65 IN | WEIGHT: 141 LBS | HEART RATE: 84 BPM | OXYGEN SATURATION: 98 % | BODY MASS INDEX: 23.49 KG/M2

## 2021-01-18 DIAGNOSIS — M79.675 PAIN OF GREAT TOE, LEFT: Primary | ICD-10-CM

## 2021-01-18 DIAGNOSIS — M79.675 PAIN OF GREAT TOE, LEFT: ICD-10-CM

## 2021-01-18 PROCEDURE — 99214 OFFICE O/P EST MOD 30 MIN: CPT | Performed by: NURSE PRACTITIONER

## 2021-01-18 PROCEDURE — 73660 X-RAY EXAM OF TOE(S): CPT | Mod: LT | Performed by: RADIOLOGY

## 2021-01-18 ASSESSMENT — MIFFLIN-ST. JEOR: SCORE: 1365.45

## 2021-01-18 ASSESSMENT — PAIN SCALES - GENERAL: PAINLEVEL: NO PAIN (0)

## 2021-01-18 NOTE — PROGRESS NOTES
"  Assessment & Plan     Pain of great toe, left  Home care reviewed, hard shoe given to her.  I will call with any abnormal x ray results, return to clinic if not improved, new, or worsening symptoms. 4 weeks    - XR Toe Left G/E 2 Views; Future  - Miscellaneous Order for DME - ONLY FOR DME        27minutes spent on the date of the encounter doing chart review, history and exam, documentation and further activities as noted above           See Patient Instructions    Return in about 4 weeks (around 2/15/2021), or if symptoms worsen or fail to improve, for Follow up.    Shanti Benites, MINH CNP  [facility] is a 30 year old who presents to clinic today for the following health issues     HPI       Musculoskeletal problem/pain  Onset/Duration: 1/16/2021- she tripped and rolled her foot catching her great toe on left on the floor- hading pain at DIP- wearing tennis shoes and not limping but complains of pain with weightbearing  Description  Location: big toe - left  Joint Swelling: YES  Redness: no  Pain: YES  Warmth: no  Intensity:  severe  Progression of Symptoms:  worsening  Accompanying signs and symptoms:   Fevers: no  Numbness/tingling/weakness: YES  History  Trauma to the area: YES- twisted  Recent illness:  no  Previous similar problem: no  Previous evaluation:  no  Precipitating or alleviating factors:  Aggravating factors include: standing and walking  Therapies tried and outcome: ice  LAST MENSTUAL PERIOD 1/13/20, not currently sexually active.      Review of Systems   Constitutional, HEENT, cardiovascular, pulmonary, gi and gu systems are negative, except as otherwise noted.      Objective    /70 (BP Location: Left arm, Patient Position: Sitting, Cuff Size: Adult Regular)   Pulse 84   Temp 97.4  F (36.3  C) (Oral)   Ht 1.651 m (5' 5\")   Wt 64 kg (141 lb)   SpO2 98%   BMI 23.46 kg/m    Body mass index is 23.46 kg/m .  Physical Exam "   GENERAL: healthy, alert and no distress  NECK: no adenopathy, no asymmetry, masses, or scars and thyroid normal to palpation  RESP: lungs clear to auscultation - no rales, rhonchi or wheezes  CV: regular rate and rhythm, normal S1 S2, no S3 or S4, no murmur, click or rub, no peripheral edema and peripheral pulses strong  ABDOMEN: soft, nontender, no hepatosplenomegaly, no masses and bowel sounds normal  MS: left great toe- no obvious swelling/deformity, FAROM- TTP at plantar aspect of DIP, no nail abnormalities, Otherwise, no gross musculoskeletal defects noted, no edema  SKIN: no suspicious lesions or rashes  NEURO: Normal strength and tone, mentation intact and speech normal  BACK: no CVA tenderness, no paralumbar tenderness  PSYCH: mentation appears normal, affect normal/bright  LYMPH: normal ant/post cervical, supraclavicular nodes    No results found for this visit on 01/18/21.    I spent a total of 25 minutes face-to-face with Shwetha Issa during today's office visit.  Over 50% of this time was spent counseling the patient and/or coordinating care regarding toe sprain management.  See note for details.

## 2021-01-18 NOTE — PATIENT INSTRUCTIONS
At [facility], we strive to deliver an exceptional experience to you, every time we see you. If you receive a survey, please complete it as we do value your feedback.  If you have MyChart, you can expect to receive results automatically within 24 hours of their completion.  Your provider will send a note interpreting your results as well.   If you do not have MyChart, you should receive your results in about a week by mail.    Your care team:                            Family Medicine Internal Medicine   MD Yo Armenta, MD Dion Rea MD Katya Georgiev PA-C Megan Hill, APRN CNP    Deshaun Wiseman, MD Pediatrics   Steve Almendarez, PAJavanC  Kendra Hopkins, CNP MD Nichole Herndon APRN CNP   MD Emma Saeed MD Deborah Mielke, MD Dottie Benites, APRN CNP  Melissa Wilson PAJACKSON Napier, CNP  MD Jennifer Sahni MD Angela Wermerskirchen, MD      Clinic hours: Monday - Thursday 7 am-7 pm; Fridays 7 am-5 pm.   Urgent care: Monday - Friday 11 am-9 pm; Saturday and Sunday 9 am-5 pm.    Clinic: [phone]       [facility]: Monday - Thursday 8 am - 7 pm; Friday 8 am - 6 pm  [facility]: [phone]     Use www.oncare.org for 24/7 diagnosis and treatment of dozens of conditions.    Patient Education     Toe Sprain  A sprain is a stretching or tearing of the ligaments that hold a joint together. There are no broken bones. Sprains generally take from 3-6 weeks to heal. A toe sprain may be treated by taping the injured toe to the next toe. This is called cecy taping. This protects the injured toe and holds it in position. Mild sprains may not need any additional support. If the toenail has been hurt badly, it may fall off in 1-2 weeks. A new one will usually start to grow back within a month.  Home care    Keep your leg elevated above  heart level when sitting or lying down. This is very important during the first 48 hours to reduce swelling. Stay off the injured foot as much as possible until you can walk on it without pain. If needed, you may use crutches during the first week for this purpose. Crutches can be rented at many pharmacies or surgical/orthopedic supply stores.    You may be given a cast shoe to wear to prevent movement in your toe. If not, you can use a sandal or any shoe that does not put pressure on the injured toe until the swelling and pain go away. If using a sandal, be careful not to hit your foot against anything, since another injury could make the sprain worse.    Apply an ice pack over the injured area for 15 to 20 minutes every 3 to 6 hours. You should do this for the first 24 to 48 hours. You can make an ice pack by filling a plastic bag that seals at the top with ice cubes and then wrapping it with a thin towel. Continue to use ice packs for relief of pain and swelling as needed. As the ice melts, be careful to avoid getting your wrap, splint, or cast wet. As the ice melts, be careful to avoid getting any wrap, splint, tape, or cast wet. After 48 hours, apply heat from a warm shower or bath for 20 minutes several times daily. Alternating ice and heat may also be helpful.    If buddy tape was applied and it becomes wet or dirty, change it. You may replace it with paper, plastic or cloth tape. Cloth tape and paper tapes must be kept dry. Apply gauze or cotton padding between the toes, especially near webbed area. This will help prevent the skin from getting moist and breaking down. Keep the buddy tape in place for at least 4 weeks, or as advised by your healthcare provider.    You may use over-the-counter pain medicine to control pain, unless another pain medicine was prescribed. If you have chronic liver or kidney disease or ever had a stomach ulcer or gastrointestinal bleeding, talk with your healthcare provider before  using these medicines.    You may return to sports after healing, when you can run without pain.    Follow-up care  Follow up with your with your healthcare provider as advised. Sometimes fractures don t show up on the first X-ray. Bruises and sprains can sometimes hurt as much as a fracture. These injuries can take time to heal completely. If your symptoms don t improve or they get worse, talk with your healthcare provider. You may need a repeat X-ray. If X-rays were taken, you will be told of any new findings that may affect your care.  When to seek medical advice  Call your healthcare provider right away if any of these occur:    Redness, warmth, or fluid drainage from your toe    Pain or swelling increases    Toes become cold, blue, numb, or tingly  [employer] last reviewed this educational content on 5/1/2018 2000-2020 [employer]. [address] [place] [address]. All rights reserved. This information is not intended as a substitute for professional medical care. Always follow your healthcare professional's instructions.

## 2021-03-01 ENCOUNTER — E-VISIT (OUTPATIENT)
Dept: URGENT CARE | Facility: URGENT CARE | Age: 31
End: 2021-03-01
Payer: COMMERCIAL

## 2021-03-01 DIAGNOSIS — B37.31 CANDIDAL VULVOVAGINITIS: Primary | ICD-10-CM

## 2021-03-01 PROCEDURE — 99421 OL DIG E/M SVC 5-10 MIN: CPT | Performed by: PHYSICIAN ASSISTANT

## 2021-03-01 RX ORDER — FLUCONAZOLE 150 MG/1
150 TABLET ORAL ONCE
Qty: 1 TABLET | Refills: 0 | Status: SHIPPED | OUTPATIENT
Start: 2021-03-01 | End: 2021-03-01

## 2021-03-02 NOTE — PATIENT INSTRUCTIONS
Thank you for choosing us for your care. I have placed an order for a prescription so that you can start treatment. View your full visit summary for details by clicking on the link below. Your pharmacist will able to address any questions you may have about the medication.     If you re not feeling better within 2-3 days, please schedule an appointment.  You can schedule an appointment right here in [place], or call [phone]  If the visit is for the same symptoms as your eVisit, we ll refund the cost of your eVisit if seen within seven days.      Yeast Infection (Candida Vaginal Infection)    You have a Candida vaginal infection. This is also known as a yeast infection. It's most often caused by a type of yeast (fungus) called Candida. Candida are normally found in the vagina. But if they increase in number, this can lead to infection and cause symptoms.   Symptoms of a yeast infection can include:     Clumpy or thin, white discharge, which may look like cottage cheese    Itching or burning    Burning with urination  Certain factors can make a yeast infection more likely. These can include:     Taking certain medicines, such as antibiotics or birth control pills    Pregnancy    Diabetes    Weak immune system  A yeast infection is most often treated with antifungal medicine. This may be given as a vaginal cream or pills you take by mouth. Treatment may last for about 1 to 7 days. Women with severe or recurrent infections may need longer courses of treatment.   Home care    If you re prescribed medicine, be sure to use it as directed. Finish all of the medicine, even if your symptoms go away. Don t try to treat yourself using over-the-counter products without talking with your provider first. They will let you know if this is a good option for you.    Ask your provider what steps you can take to help reduce your risk of having a yeast infection in the future.    Follow-up care  Follow up with your healthcare  provider, or as directed.   When to seek medical advice  Call your healthcare provider right away if:     You have a fever of 100.4 F (38 C) or higher, or as directed by your provider.    Your symptoms worsen, or they don t go away within a few days of starting treatment.    You have new pain in the lower belly or pelvic region.    You have side effects that bother you or a reaction to the cream or pills you re prescribed.    You or any partners you have sex with have new symptoms, such as a rash, joint pain, or sores.  [employer] last reviewed this educational content on 7/1/2020 2000-2020 [employer]. [address] [place] [address], [place] [address]. All rights reserved. This information is not intended as a substitute for professional medical care. Always follow your healthcare professional's instructions.

## 2021-03-11 ENCOUNTER — OFFICE VISIT (OUTPATIENT)
Dept: OBGYN | Facility: CLINIC | Age: 31
End: 2021-03-11
Payer: COMMERCIAL

## 2021-03-11 VITALS
DIASTOLIC BLOOD PRESSURE: 82 MMHG | HEART RATE: 77 BPM | BODY MASS INDEX: 23.11 KG/M2 | WEIGHT: 138.9 LBS | SYSTOLIC BLOOD PRESSURE: 135 MMHG

## 2021-03-11 DIAGNOSIS — N76.0 VAGINITIS AND VULVOVAGINITIS: Primary | ICD-10-CM

## 2021-03-11 LAB
SPECIMEN SOURCE: ABNORMAL
WET PREP SPEC: ABNORMAL

## 2021-03-11 PROCEDURE — 99213 OFFICE O/P EST LOW 20 MIN: CPT | Performed by: OBSTETRICS & GYNECOLOGY

## 2021-03-11 PROCEDURE — 87210 SMEAR WET MOUNT SALINE/INK: CPT | Performed by: OBSTETRICS & GYNECOLOGY

## 2021-03-11 RX ORDER — METRONIDAZOLE 7.5 MG/G
GEL TOPICAL
Qty: 45 G | Refills: 3 | Status: SHIPPED | OUTPATIENT
Start: 2021-03-11 | End: 2021-04-27

## 2021-03-11 RX ORDER — METRONIDAZOLE 500 MG/1
500 TABLET ORAL 2 TIMES DAILY
Qty: 20 TABLET | Refills: 1 | Status: SHIPPED | OUTPATIENT
Start: 2021-03-11 | End: 2021-03-18

## 2021-03-11 NOTE — PROGRESS NOTES
Shwetha is a 30 year old  referred here by Robbin Mora for consultation regarding recurrent BV. 5 episodes in the last 8 months. Has been treated with po flagyl and metrogel episodically.She has also tried boric acid.    ROS: Ten point review of systems was reviewed and negative except the above.    Gyne: - abn pap (last pap ), - STD's    Past Medical History:   Diagnosis Date     H/O colposcopy with cervical biopsy 05/10/2013    resolved     History of asthma 2008    exercise induced.  No symptoms since .     Intermittent asthma 2014     LSIL (low grade squamous intraepithelial lesion) on Pap smear 3/19/13    resolved     Sickle cell trait (H)      Trichomonal vulvovaginitis      Past Surgical History:   Procedure Laterality Date     HC COLP CERVIX/UPPER VAGINA      neg     Patient Active Problem List   Diagnosis     CARDIOVASCULAR SCREENING; LDL GOAL LESS THAN 160     Sickle cell trait (H)       ALL/Meds: Her medication and allergy histories were reviewed and are documented in their appropriate chart areas.    SH: - tob, - EtOH, single    FH: Her family history was reviewed and documented in its appropriate chart area.    PE: /82   Pulse 77   Wt 63 kg (138 lb 14.4 oz)   LMP 2021 (Exact Date)   Breastfeeding No   BMI 23.11 kg/m    Body mass index is 23.11 kg/m .    General Appearance:  healthy, alert, active, no distress  HEENT: NCAT  Abdomen: Soft, nontender.  Normal bowel sounds.  No masses  Pelvic:       - Ext: NEFG,        - Urethra: no lesions, no masses, no hypermobility       - Urethral Meatus: normal appearance,        - Bladder: no tenderness, no masses       - Vagina: pink, moist, normal rugae, no lesions, grey discharge       - Cervix: no lesions, noparous    Nurse for exam.  Results for orders placed or performed in visit on 21   Wet prep     Status: Abnormal    Specimen: Vagina   Result Value Ref Range    Specimen Description Vagina     Wet Prep No  Trichomonas seen     Wet Prep Moderate  Clue cells seen   (A)     Wet Prep No yeast seen     Wet Prep Few  WBC'S seen             A/P    ICD-10-CM    1. Vaginitis and vulvovaginitis  N76.0 Wet prep     metroNIDAZOLE (FLAGYL) 500 MG tablet     metroNIDAZOLE (METROGEL) 0.75 % external gel     We discussed Relapsing/Recurrent BV treatment with longer term flagyl x 10 days , followed by twice weekly vaginal metrogel for 4-6 months suppressive therapy.  She agrees to POC. ACOG pamphlet provided on the above treatment plan.  Total time preparing to see patient with reviewing prior encounter and labs, face to face time,  and coordinating care on the same calendar date:30 mins.    CEPHAS AGBEH, MD.

## 2021-03-11 NOTE — PATIENT INSTRUCTIONS
If you have any questions regarding your visit, Please contact your care team.  [facility]: [phone]  Women s Health CLINIC HOURS TELEPHONE NUMBER   Cephas Agbeh, M.D. Becky-RN Kylie-RN Heidi-RN Deanna-MA Angela-  Tatiana-         Monday-Job    8:00a.m-4:45 p.m    Tuesday--[place]     8:00a.m-4:45 p.m.    Thursday-Job    8:00a.m-4:45 p.m.    Friday-Job    8:00a.m-4:45 p.m    [facility]   [address]   [place] [address]   [phone]   Fax [phone]   Vaetwcl-[phone]     [facility]   [address] [place] [address] [place] [address]   [phone]    [facility]  [address] [facility] [address]  [phone]  Etybptx-[phone]   Urgent Care locations:    [facility] Monday-Friday  5 pm - 9 pm  Saturday and Sunday   9 am - 5 pm   Monday-Friday   5 pm - 9 pm  Saturday and Sunday  9 am - 5 pm    [phone]   If you need a medication refill, please contact your pharmacy. Please allow 3 business days for your refill to be completed.  As always, Thank you for trusting us with your healthcare needs!

## 2021-04-27 ENCOUNTER — TELEPHONE (OUTPATIENT)
Dept: FAMILY MEDICINE | Facility: CLINIC | Age: 31
End: 2021-04-27

## 2021-04-27 ENCOUNTER — OFFICE VISIT (OUTPATIENT)
Dept: FAMILY MEDICINE | Facility: CLINIC | Age: 31
End: 2021-04-27
Payer: COMMERCIAL

## 2021-04-27 VITALS
WEIGHT: 140 LBS | BODY MASS INDEX: 23.32 KG/M2 | HEART RATE: 73 BPM | SYSTOLIC BLOOD PRESSURE: 114 MMHG | DIASTOLIC BLOOD PRESSURE: 73 MMHG | HEIGHT: 65 IN | OXYGEN SATURATION: 99 %

## 2021-04-27 DIAGNOSIS — B96.89 BACTERIAL VAGINOSIS: ICD-10-CM

## 2021-04-27 DIAGNOSIS — N76.0 BACTERIAL VAGINOSIS: ICD-10-CM

## 2021-04-27 DIAGNOSIS — N89.8 VAGINAL DISCHARGE: Primary | ICD-10-CM

## 2021-04-27 LAB
SPECIMEN SOURCE: ABNORMAL
WET PREP SPEC: ABNORMAL

## 2021-04-27 PROCEDURE — 87591 N.GONORRHOEAE DNA AMP PROB: CPT | Performed by: NURSE PRACTITIONER

## 2021-04-27 PROCEDURE — 99213 OFFICE O/P EST LOW 20 MIN: CPT | Performed by: NURSE PRACTITIONER

## 2021-04-27 PROCEDURE — 87491 CHLMYD TRACH DNA AMP PROBE: CPT | Performed by: NURSE PRACTITIONER

## 2021-04-27 PROCEDURE — 87210 SMEAR WET MOUNT SALINE/INK: CPT | Performed by: NURSE PRACTITIONER

## 2021-04-27 RX ORDER — METRONIDAZOLE 7.5 MG/G
1 GEL VAGINAL DAILY
Qty: 70 G | Refills: 3 | Status: SHIPPED | OUTPATIENT
Start: 2021-04-27 | End: 2021-04-27

## 2021-04-27 RX ORDER — METRONIDAZOLE 500 MG/1
500 TABLET ORAL 2 TIMES DAILY
Qty: 14 TABLET | Refills: 0 | Status: SHIPPED | OUTPATIENT
Start: 2021-04-27 | End: 2021-05-04

## 2021-04-27 RX ORDER — METRONIDAZOLE 7.5 MG/G
GEL VAGINAL
Qty: 70 G | Refills: 3 | Status: SHIPPED | OUTPATIENT
Start: 2021-04-27 | End: 2021-06-29

## 2021-04-27 ASSESSMENT — PAIN SCALES - GENERAL: PAINLEVEL: NO PAIN (0)

## 2021-04-27 ASSESSMENT — MIFFLIN-ST. JEOR: SCORE: 1360.92

## 2021-04-27 NOTE — PATIENT INSTRUCTIONS
Patient Education     Bacterial Vaginosis    You have a vaginal infection called bacterial vaginosis (BV). Both good and bad bacteria are present in a healthy vagina. BV occurs when these bacteria get out of balance. The number of bad bacteria increase. And the number of good bacteria decrease. BV is linked with sexual activity, but it's not a sexually transmitted infection (STI).   BV may or may not cause symptoms. If symptoms do occur, they can include:     Thin, gray, milky-white, or sometimes green discharge    Unpleasant odor or  fishy  smell    Itching, burning, or pain in or around the vagina  It is not known what causes BV, but certain factors can make the problem more likely. These can include:     Douching    Spermicides    Use of antibiotics    Change in hormone levels with pregnancy, breastfeeding, or menopause    Having sex with a new partner    Having sex with more than one partner  BV will sometimes go away on its own. But treatment is often advised. This is because untreated BV can raise the risk of more serious health problems such as:     Pelvic inflammatory disease (PID)     delivery (giving birth to a baby early if you re pregnant)    HIV and some other sexually transmitted infections (STIs)    Infection after surgery on the reproductive organs  Home care  General care    BV is most often treated with medicines called antibiotics. These may be given as pills or as a vaginal cream. If antibiotics are prescribed, be sure to use them exactly as directed. And complete all of the medicine, even if your symptoms go away.    Don't douche or having sex during treatment.    If you have sex with a female partner, ask your healthcare provider if she should also be treated.  Prevention    Don't douche.    Don't have sex. If you do have sex, then take steps to lower your risk:  ? Use condoms when having sex.  ? Limit the number of sex partners you have.    Follow-up care  Follow up with your  healthcare provider, or as advised.   When to get medical advice  Call your healthcare provider right away if:     You have a fever of 100.4 F (38 C) or higher, or as directed by your provider.    Your symptoms get worse, or they don t go away within a few days of starting treatment.    You have new pain in the lower belly or pelvic region.    You have side effects that bother you or a reaction to the pills or cream you re prescribed.    You or any of your sex partners have new symptoms, such as a rash, joint pain, or sores.  [email or web address] last reviewed this educational content on 6/1/2020 2000-2021 [employer]. All rights reserved. This information is not intended as a substitute for professional medical care. Always follow your healthcare professional's instructions.

## 2021-04-27 NOTE — TELEPHONE ENCOUNTER
Pharmacy needs clarification on directions please either send a new script or call the pharmacy and speak to a pharmacist.  Bhavesh Hodge,  For 1st Floor Primary Care

## 2021-04-27 NOTE — PROGRESS NOTES
Assessment & Plan     Vaginal discharge  Wet prep with clue cells. STI testing pending.  - Chlamydia trachomatis PCR  - Neisseria gonorrhoeae PCR  - Wet prep    Bacterial vaginosis  Start oral flagyl then begin topical twice weekly for recurrent bacterial vaginosis. Supportive interventions discussed and it sounds like she is doing everything we recommend.   - metroNIDAZOLE (FLAGYL) 500 MG tablet; Take 1 tablet (500 mg) by mouth 2 times daily for 7 days  - metroNIDAZOLE (METROGEL) 0.75 % vaginal gel; Place 1 applicator (5 g) vaginally daily Apply twice weekly for 4-6 months. Start after oral metronidazole is completed         See Patient Instructions    Return in about 4 weeks (around 5/25/2021), or if symptoms worsen or fail to improve.     The benefits, risks and potential side effects were discussed in detail. Black box warnings discussed as relevant. All patient questions were answered. The patient was instructed to follow up immediately if any adverse reactions develop.    Return precautions discussed, including when to seek urgent/emergent care.    Patient verbalizes understanding and agrees with plan of care. Patient stable for discharge.      MINH Callejas CNP  [facility] MARTINEZ Tadeo is a 30 year old who presents for the following health issues     HPI     Vaginal Symptoms  Onset/Duration: about 2 month but she feels it's been on & off for about 1 year  Description:  Vaginal Discharge: white creamy   Itching (Pruritis): no  Burning sensation:  no  Odor: YES  Accompanying Signs & Symptoms:  Urinary symptoms: no  Abdominal pain: no  Fever: no  History:   Sexually active: YES  New Partner: no  Possibility of Pregnancy:  no  Recent antibiotic use: Metronidazole Gel  Previous vaginitis issues: YES  Precipitating or alleviating factors: None  Therapies tried and outcome: Flagyl (metronidazole) Gel    Odor, not much discharge - usually occurs around menses  Started  "probiotics to help as well  Not sexually active in the last 6 months  New boyfriend is long distance but coming here in a month  Symptoms started with previous partner  Not pregnant or breastfeeding      Review of Systems   Constitutional, HEENT, cardiovascular, pulmonary, gi and gu systems are negative, except as otherwise noted.      Objective    /73 (BP Location: Right arm, Patient Position: Chair, Cuff Size: Adult Regular)   Pulse 73   Ht 1.651 m (5' 5\")   Wt 63.5 kg (140 lb)   LMP 04/27/2021   SpO2 99%   Breastfeeding No   BMI 23.30 kg/m    Body mass index is 23.3 kg/m .  Physical Exam   GENERAL: healthy, alert and no distress  : declines exam as she is on last day of menses  PSYCH: mentation appears normal, affect normal/bright    Results for orders placed or performed in visit on 04/27/21 (from the past 24 hour(s))   Wet prep    Specimen: Vagina   Result Value Ref Range    Specimen Description Vagina     Wet Prep Moderate  Clue cells seen   (A)     Wet Prep No yeast seen     Wet Prep No Trichomonas seen     Wet Prep Few  WBC'S seen                  "

## 2021-04-27 NOTE — TELEPHONE ENCOUNTER
Please call pharmacy to get more info. Thanks    · Patient's injury includes comminuted right-sided acetabular fracture with right hip in suprapubic region hematoma; this is status post fall from walker on Pradaxa  · Orthopedics is recommending non operative management with toe-touch weight-bearing on right lower extremity  · Physical and occupational therapy recommending rehab facility  · Serial hemoglobins reveals current hemoglobin at 8 9 from 9 7 yesterday  Stable

## 2021-04-28 LAB
C TRACH DNA SPEC QL NAA+PROBE: NEGATIVE
N GONORRHOEA DNA SPEC QL NAA+PROBE: NEGATIVE
SPECIMEN SOURCE: NORMAL
SPECIMEN SOURCE: NORMAL

## 2021-06-29 ENCOUNTER — OFFICE VISIT (OUTPATIENT)
Dept: OBGYN | Facility: CLINIC | Age: 31
End: 2021-06-29
Payer: COMMERCIAL

## 2021-06-29 ENCOUNTER — TELEPHONE (OUTPATIENT)
Dept: OBGYN | Facility: CLINIC | Age: 31
End: 2021-06-29

## 2021-06-29 VITALS
BODY MASS INDEX: 22.33 KG/M2 | TEMPERATURE: 97.7 F | DIASTOLIC BLOOD PRESSURE: 87 MMHG | OXYGEN SATURATION: 99 % | HEIGHT: 65 IN | WEIGHT: 134 LBS | HEART RATE: 79 BPM | SYSTOLIC BLOOD PRESSURE: 123 MMHG

## 2021-06-29 DIAGNOSIS — O36.80X0 PREGNANCY WITH INCONCLUSIVE FETAL VIABILITY, SINGLE OR UNSPECIFIED FETUS: Primary | ICD-10-CM

## 2021-06-29 LAB — B-HCG SERPL-ACNC: ABNORMAL IU/L (ref 0–5)

## 2021-06-29 PROCEDURE — 84702 CHORIONIC GONADOTROPIN TEST: CPT | Performed by: NURSE PRACTITIONER

## 2021-06-29 PROCEDURE — 99213 OFFICE O/P EST LOW 20 MIN: CPT | Performed by: NURSE PRACTITIONER

## 2021-06-29 PROCEDURE — 36415 COLL VENOUS BLD VENIPUNCTURE: CPT | Performed by: NURSE PRACTITIONER

## 2021-06-29 ASSESSMENT — PAIN SCALES - GENERAL: PAINLEVEL: NO PAIN (0)

## 2021-06-29 ASSESSMENT — MIFFLIN-ST. JEOR: SCORE: 1333.7

## 2021-06-29 NOTE — PROGRESS NOTES
"    Assessment & Plan     Pregnancy with inconclusive fetal viability, single or unspecified fetus  We reviewed her ED results and work up including her ultrasound result. Repeat HCG today and we discussed follow up ultrasound no sooner than 10 days if levels increasing.We discussed management if HCG decreasing. Reviewed red flag symptoms for which follow up would be needed, reviewed restrictions at this time as well including no intercourse, strenuous activity, heavy lifting. Patient is given an opportunity to ask questions and have them answered.  - HCG quantitative pregnancy; Standing  - US OB < 14 Weeks Single; Future  - HCG quantitative pregnancy    MINH Barakat CNP  [facility]   Shwetha is a 30 year old who presents for the following health issues     HPI     ED Followup:    Facility:  [facility]  Date of visit: 06/27/2021  Reason for visit: Pelvic pain in pregnancy  Current Status: No pain      Patient with LMP was 4/23/21. Seen in the ED 2 days ago with pelvic pain. Work up done and Quant HCG was just over 29,000. She is Rh positive. Ultrasound showed a gestational sac at 6 weeks 1 day but no fetal pole or heartbeat. Subchorionic hemorrhage was noted. Based on her LMP, would be 9 weeks 4 days today. No vaginal bleeding. Some mild left sided pain this morning upon waking, resolved on its own in less than an hour. In ED, pain was more on the right.       Review of Systems   Constitutional, HEENT, cardiovascular, pulmonary, gi and gu systems are negative, except as otherwise noted.      Objective    /87 (BP Location: Right arm, Patient Position: Sitting, Cuff Size: Adult Regular)   Pulse 79   Temp 97.7  F (36.5  C) (Tympanic)   Ht 1.651 m (5' 5\")   Wt 60.8 kg (134 lb)   LMP 04/23/2021 (Exact Date)   SpO2 99%   BMI 22.30 kg/m    Body mass index is 22.3 kg/m .  Physical Exam   GENERAL: healthy, alert and no distress   (female): deferred as " her children are present  MS: no gross musculoskeletal defects noted, no edema  SKIN: no suspicious lesions or rashes  PSYCH: mentation appears normal, affect normal/bright

## 2021-06-29 NOTE — TELEPHONE ENCOUNTER
Pt states first day of LMP 4/23/21 (not 4/27/21- last day of period) . Based on 4/23/21 LMP pt would be 9w 4d.    Pt calling to schedule appt today from f/u from ED on 6/27/21, with questionable miscarriage.  Denies bleeding.    Pt has twin 3yo that doesn't have childcare for this week due to  changes. RN assisted pt in scheduling, okay'ed pt to bring kids to appt due to situation and needing to be seen.    Routing to provider for awareness.    Lisa Weems, BSN RN

## 2021-07-01 ENCOUNTER — MYC MEDICAL ADVICE (OUTPATIENT)
Dept: OBGYN | Facility: CLINIC | Age: 31
End: 2021-07-01

## 2021-07-01 DIAGNOSIS — O36.80X0 PREGNANCY WITH INCONCLUSIVE FETAL VIABILITY, SINGLE OR UNSPECIFIED FETUS: ICD-10-CM

## 2021-07-01 LAB — B-HCG SERPL-ACNC: ABNORMAL IU/L (ref 0–5)

## 2021-07-01 PROCEDURE — 84702 CHORIONIC GONADOTROPIN TEST: CPT | Performed by: NURSE PRACTITIONER

## 2021-07-01 PROCEDURE — 36415 COLL VENOUS BLD VENIPUNCTURE: CPT | Performed by: NURSE PRACTITIONER

## 2021-07-06 ENCOUNTER — ANCILLARY PROCEDURE (OUTPATIENT)
Dept: ULTRASOUND IMAGING | Facility: CLINIC | Age: 31
End: 2021-07-06
Attending: NURSE PRACTITIONER
Payer: COMMERCIAL

## 2021-07-06 DIAGNOSIS — O36.80X0 PREGNANCY WITH INCONCLUSIVE FETAL VIABILITY, SINGLE OR UNSPECIFIED FETUS: ICD-10-CM

## 2021-07-06 PROCEDURE — 76801 OB US < 14 WKS SINGLE FETUS: CPT | Performed by: RADIOLOGY

## 2021-07-22 ENCOUNTER — ANCILLARY PROCEDURE (OUTPATIENT)
Dept: ULTRASOUND IMAGING | Facility: CLINIC | Age: 31
End: 2021-07-22
Attending: NURSE PRACTITIONER
Payer: COMMERCIAL

## 2021-07-22 DIAGNOSIS — O36.80X0 PREGNANCY WITH INCONCLUSIVE FETAL VIABILITY, SINGLE OR UNSPECIFIED FETUS: ICD-10-CM

## 2021-07-22 PROCEDURE — 76801 OB US < 14 WKS SINGLE FETUS: CPT

## 2021-07-28 ENCOUNTER — PRENATAL OFFICE VISIT (OUTPATIENT)
Dept: OBGYN | Facility: CLINIC | Age: 31
End: 2021-07-28
Payer: COMMERCIAL

## 2021-07-28 VITALS
BODY MASS INDEX: 21.95 KG/M2 | WEIGHT: 131.9 LBS | OXYGEN SATURATION: 98 % | SYSTOLIC BLOOD PRESSURE: 125 MMHG | HEART RATE: 97 BPM | DIASTOLIC BLOOD PRESSURE: 79 MMHG

## 2021-07-28 DIAGNOSIS — O98.311 CHLAMYDIA TRACHOMATIS INFECTION IN MOTHER DURING FIRST TRIMESTER OF PREGNANCY: ICD-10-CM

## 2021-07-28 DIAGNOSIS — A56.8 CHLAMYDIA TRACHOMATIS INFECTION IN MOTHER DURING FIRST TRIMESTER OF PREGNANCY: ICD-10-CM

## 2021-07-28 DIAGNOSIS — Z23 NEED FOR TDAP VACCINATION: ICD-10-CM

## 2021-07-28 DIAGNOSIS — Z34.81 ENCOUNTER FOR SUPERVISION OF OTHER NORMAL PREGNANCY IN FIRST TRIMESTER: Primary | ICD-10-CM

## 2021-07-28 LAB
ABO/RH(D): NORMAL
ANTIBODY SCREEN: NEGATIVE
BASOPHILS # BLD AUTO: 0 10E3/UL (ref 0–0.2)
BASOPHILS NFR BLD AUTO: 0 %
EOSINOPHIL # BLD AUTO: 0.1 10E3/UL (ref 0–0.7)
EOSINOPHIL NFR BLD AUTO: 1 %
ERYTHROCYTE [DISTWIDTH] IN BLOOD BY AUTOMATED COUNT: 11.8 % (ref 10–15)
HCT VFR BLD AUTO: 33.3 % (ref 35–47)
HGB BLD-MCNC: 11.6 G/DL (ref 11.7–15.7)
IMM GRANULOCYTES # BLD: 0 10E3/UL
IMM GRANULOCYTES NFR BLD: 0 %
LYMPHOCYTES # BLD AUTO: 2.5 10E3/UL (ref 0.8–5.3)
LYMPHOCYTES NFR BLD AUTO: 23 %
MCH RBC QN AUTO: 30.7 PG (ref 26.5–33)
MCHC RBC AUTO-ENTMCNC: 34.8 G/DL (ref 31.5–36.5)
MCV RBC AUTO: 88 FL (ref 78–100)
MONOCYTES # BLD AUTO: 0.7 10E3/UL (ref 0–1.3)
MONOCYTES NFR BLD AUTO: 6 %
NEUTROPHILS # BLD AUTO: 7.8 10E3/UL (ref 1.6–8.3)
NEUTROPHILS NFR BLD AUTO: 70 %
NRBC # BLD AUTO: 0 10E3/UL
NRBC BLD AUTO-RTO: 0 /100
PLATELET # BLD AUTO: 204 10E3/UL (ref 150–450)
RBC # BLD AUTO: 3.78 10E6/UL (ref 3.8–5.2)
SPECIMEN EXPIRATION DATE: NORMAL
WBC # BLD AUTO: 11 10E3/UL (ref 4–11)

## 2021-07-28 PROCEDURE — 87340 HEPATITIS B SURFACE AG IA: CPT | Performed by: ADVANCED PRACTICE MIDWIFE

## 2021-07-28 PROCEDURE — 87591 N.GONORRHOEAE DNA AMP PROB: CPT | Performed by: ADVANCED PRACTICE MIDWIFE

## 2021-07-28 PROCEDURE — 87086 URINE CULTURE/COLONY COUNT: CPT | Performed by: ADVANCED PRACTICE MIDWIFE

## 2021-07-28 PROCEDURE — 86803 HEPATITIS C AB TEST: CPT | Performed by: ADVANCED PRACTICE MIDWIFE

## 2021-07-28 PROCEDURE — 36415 COLL VENOUS BLD VENIPUNCTURE: CPT | Performed by: ADVANCED PRACTICE MIDWIFE

## 2021-07-28 PROCEDURE — 86850 RBC ANTIBODY SCREEN: CPT | Performed by: ADVANCED PRACTICE MIDWIFE

## 2021-07-28 PROCEDURE — 87491 CHLMYD TRACH DNA AMP PROBE: CPT | Performed by: ADVANCED PRACTICE MIDWIFE

## 2021-07-28 PROCEDURE — 86780 TREPONEMA PALLIDUM: CPT | Performed by: ADVANCED PRACTICE MIDWIFE

## 2021-07-28 PROCEDURE — 86901 BLOOD TYPING SEROLOGIC RH(D): CPT | Performed by: ADVANCED PRACTICE MIDWIFE

## 2021-07-28 PROCEDURE — 87389 HIV-1 AG W/HIV-1&-2 AB AG IA: CPT | Performed by: ADVANCED PRACTICE MIDWIFE

## 2021-07-28 PROCEDURE — 99207 PR FIRST OB VISIT: CPT | Performed by: ADVANCED PRACTICE MIDWIFE

## 2021-07-28 PROCEDURE — 86762 RUBELLA ANTIBODY: CPT | Performed by: ADVANCED PRACTICE MIDWIFE

## 2021-07-28 PROCEDURE — 86900 BLOOD TYPING SEROLOGIC ABO: CPT | Performed by: ADVANCED PRACTICE MIDWIFE

## 2021-07-28 PROCEDURE — 85025 COMPLETE CBC W/AUTO DIFF WBC: CPT | Performed by: ADVANCED PRACTICE MIDWIFE

## 2021-07-28 RX ORDER — METOCLOPRAMIDE 10 MG/1
10 TABLET ORAL
Qty: 56 TABLET | Refills: 0 | Status: SHIPPED | OUTPATIENT
Start: 2021-07-28 | End: 2021-08-12

## 2021-07-28 NOTE — PATIENT INSTRUCTIONS
Thank for choosing our clinic for your health care needs. Our goal is to provided you with excellent care. One way that we continue to improve our care is by listening to our patients. Please take a few minutes to complete the written survey that you may receive in the mail after your visit.     You may reach your care team by calling the following number:    Rachna Zaldivar- [phone]   For clinic hours at [place] [facility] [place]  please visit the [facility] web site [email or web address]    Notification of your lab results:  Normal or non-critical lab and imaging results will be communicated to you by Mychart, letter, or phone within 7 days. If you do not hear from us within 10 days, please contact us through [employer]hart or phone. If you have a critical or abnormal lab result, we will notify you by phone as soon as possible.  Pap smears often take a bit longer.       Medication Refills:  Please contact your pharmacy and they will forward the refill to us. Please allow 3 business days for your refills to be completed.     Secure access to your medical record:  Use "[employer]t (secure email communication and access to your chart) to send your primary care provider a message or make an appointment. Ask someone on your Team how to sign up for [employer]. To log on to [employer] or for more information in [employer] please visit the website at [email or web address].            How to prevent CMV or cytomegalovirus infection while you are pregnant:    Thoroughly wash your hands with soap and warm water especially after doing things such as:  Diaper changes  Feeding or bathing a child  Wiping a child's runny nose or drool  Handling a child's toys    Do not share cups, plates, utensils, toothbrushes or food with your children  Do not kiss young children on the mouth or cheek. Instead, kiss them on the head or give them a hug.  Do not share towels or washcloths with young children.  Clean toy, counter tops, and other surfaces that come in  contact with urine or saliva.        LISTERIA  Individuals can reduce the risk for listeria contamination through proper food selection, handling, and storage, such as:    Rinsing raw produce (fuits and vegetables) before eating, cutting, or cooking;    Keeping refrigerators at 40 degrees F or lower;    Buying soft cheeses only if their labels state that they are made with pasteurized milk.  Also avoiding cheese that has not been initially wrapped in plastic.  These cheeses include brie, camembert, blue and the soft Mexican cheeses like con queso.    Heating all food that can be heated but especially hot dogs, luncheon meats, and cold cuts to an internal temperature of 165 degrees F or until steaming hot before serving them; and    Washing your hands for at least 20 seconds with warm water and soap before and after handling cantaloupes or other melons.    Watch for food recalls for listeria and contact us if you believe you have been exposed.               First line remedies for nausea in pregnancy    Eat small frequent meals every 2-3 hours if possible.   Avoid food at extremes of temparture and drinks with carbination.  Eat foods that appeal to you, avoiding fats and spicy foods.  Bread, pasta, crackers, potatoes, and rice tend to be tolerated the best.  Don't worry about what you eat in the first 3 months, it is more important that you can eat and keep it down.   Try flat ginger ale.  Ginger is a herbal remedy for nausea and you can use it in any form.  There are ginger tablets you can purchase.  The dose is 500 to 1000 mg a day.   You may also try doxylamine 12.5 mg three times a day which is a sleeping medication along with Vitamin B6 25 mg three times a day.  This combination takes up to a week to work so give it some time.  Be sure to take both the doxylamine and B6  Doxylamine is sometimes called Unisom and it comes in 25 mg tablets so you will have to break it in half and take half a tablet.   It is  important to take the Unisom and B6 together or it won't be effective.  If you begin to vomit more than 5 or 6 times a day and feel that you are unable to keep anything down, call the clinic for an appointment to be seen.                Fruits and vegetables high in pesticide contamination  Strawberries  Spinach  Kale  Nectarines  Peaches  Apples  Grapes  Cherries  Pears  Tomatoes  Celery  Potatoes  Hot Peppers.     Consider extra washing of these fruits and vegetables, peeling if possible or purchasing organics.     Fruits and vegetables lowest if pesticide contramination:  Avocado  Sweet corn  Pineapple  Cabbage   Onions   Frozen peas  Papaya  Asparagus  Mushrooms  Eggplant  Honeydew  Kiwi  Cantaloupe  Cauliflower  Broccoli      Consider eliminating or reducing the following additives in personal care products and make up:  Triclosan  Paraben  Phthalates  Phenols  Products that do not contain these additives are often found in the organic or green sections of stores.

## 2021-07-28 NOTE — PROGRESS NOTES
Shwetha Issa is a 30 year old  who presents to the clinic for an new ob visit.   Estimated Date of Delivery: Data Unavailable is calculated from Patient's last menstrual period was 2021 (exact date).       She has had nausea resulting in weigh loss of about 10 bs     HISTORY  updated and reviewed  Past Medical History:   Diagnosis Date     H/O colposcopy with cervical biopsy 05/10/2013    resolved     History of asthma 2008    exercise induced.  No symptoms since .     Intermittent asthma 2014     LSIL (low grade squamous intraepithelial lesion) on Pap smear 3/19/13    resolved     Sickle cell trait (H)      Trichomonal vulvovaginitis      Past Surgical History:   Procedure Laterality Date     HC COLP CERVIX/UPPER VAGINA  2013    neg     Social History     Socioeconomic History     Marital status:      Spouse name: Aby     Number of children: 2     Years of education: Not on file     Highest education level: Not on file   Occupational History     Employer: [employer]     Occupation: /advertising work     Comment: occas     Occupation: , sales, [employer]     Comment: [employer]   Tobacco Use     Smoking status: Never Smoker     Smokeless tobacco: Never Used   Substance and Sexual Activity     Alcohol use: Not Currently     Alcohol/week: 0.0 standard drinks     Comment: pregnant     Drug use: No     Sexual activity: Yes     Partners: Male     Birth control/protection: None     Comment: pregnant   Other Topics Concern     Parent/sibling w/ CABG, MI or angioplasty before 65F 55M? Not Asked   Social History Narrative     Not on file     Social Determinants of Health     Financial Resource Strain:      Difficulty of Paying Living Expenses:    Food Insecurity:      Worried About Running Out of Food in the Last Year:      Ran Out of Food in the Last Year:    Transportation Needs:      Lack of Transportation (Medical):      Lack of Transportation (Non-Medical):    Physical  Activity:      Days of Exercise per Week:      Minutes of Exercise per Session:    Stress:      Feeling of Stress :    Social Connections:      Frequency of Communication with Friends and Family:      Frequency of Social Gatherings with Friends and Family:      Attends Church Services:      Active Member of Clubs or Organizations:      Attends Club or Organization Meetings:      Marital Status:    Intimate Partner Violence:      Fear of Current or Ex-Partner:      Emotionally Abused:      Physically Abused:      Sexually Abused:      Health Maintenance   Topic Date Due     ADVANCE CARE PLANNING  Never done     Pneumococcal Vaccine: Pediatrics (0 to 5 Years) and At-Risk Patients (6 to 64 Years) (1 of 4 - PCV13) Never done     COVID-19 Vaccine (1) Never done     HEPATITIS C SCREENING  Never done     PREVENTIVE CARE VISIT  2021     INFLUENZA VACCINE (1) 2021     PAP  2022     DTAP/TDAP/TD IMMUNIZATION (7 - Td or Tdap) 2030     HIV SCREENING  Completed     PHQ-2  Completed     IPV IMMUNIZATION  Completed     HEPATITIS B IMMUNIZATION  Completed     MENINGITIS IMMUNIZATION  Aged Out     Family History   Problem Relation Age of Onset     Cardiovascular Other         Hypertrophic cardiomyopathy in cousin,  in .  Shwetha had normal Echo in .     Thrombophilia Other         sickle cell     Diabetes Other      Hypertension Other      Gastrointestinal Disease Other         peptic ulcers     Blood Disease Maternal Grandfather         sickle cell disease     Blood Disease Mother         sickle cell trait     Cerebrovascular Disease Maternal Grandmother      Cerebrovascular Disease Paternal Grandmother             ROS: 10 point ROS neg other than the symptoms noted above in the HPI.      PHYSICAL EXAM  /79 (BP Location: Right arm, Cuff Size: Adult Regular)   Pulse 97   Wt 59.8 kg (131 lb 14.4 oz)   LMP 2021 (Exact Date)   SpO2 98%   Breastfeeding No   BMI 21.95 kg/m     GENERAL:  Pleasant pregnant female, alert, cooperative  and well groomed.  SKIN:  Warm and dry, without lesions or rashes  HEAD: Symmetrical features.  EYES:  PERRLA.  EARS: Tympanic membranes gray, translucent and intact.  NECK:  Thyroid without enlargement and nodules.  Lymph nodes not palpable.   LUNGS:  Clear to auscultation.  BREAST:  Symmetrical.  No dominant, fixed or suspicious masses are noted.  No skin or nipple changes or axillary nodes.    Nipples everted.      HEART:  RRR with  out murmur.  ABDOMEN: Soft without masses , tenderness or organomegaly.  No CVA tenderness.  Uterus palpable at size equal to dates.  No scars noted..  MUSCULOSKELETAL:  Full range of motion  GENITALIA: EGBUS normal. Vulva reveals no erythema or lesions.       VAGINA:  pink, normal ruga and discharge, no lesions.        EXTREMITIES:  No edema. No significant varicosities.    ASSESSMENT:  Intrauterine pregnancy of Unknown  (Z34.81) Encounter for supervision of other normal pregnancy in first trimester  (primary encounter diagnosis)  Comment:   Plan: HIV Antigen Antibody Combo, Rubella Antibody         IgG, Hepatitis B surface antigen, Treponema Abs        w Reflex to RPR and Titer, CBC with Platelets &        Differential, Urine Culture, Hepatitis C         antibody, ABO/Rh type and screen,         metoclopramide (REGLAN) 10 MG tablet            (Z23) Need for Tdap vaccination  Comment:   Plan:       PLAN:  Options for  testing for chromosomal and birth defects were discussed with the patient. Diagnostic tests include CVS and Amniocentesis. We discussed that these tests are definitive but invasive and do carry a risk of fetal loss.   Screening tests include nuchal translucency/blood marker testing in the first trimester and quad screening in the second trimester. We discussed that these are screening tests and not diagnostic tests and that false positives and negatives are a distinct possibility.  Requests  NIPT.    Instructed on best evidence for: weight gain for her weight and height for pregnancy; healthy diet and foods to avoid; exercise and activity during pregnancy;avoiding exposure to toxoplasmosis; and maintenance of a generally healthy lifestyle.     Intended hospital for birth is [facility].    Reviewed transmission of and avoidance strategies for CMV.   Patient does not meet criteria for low dose Asprin therapy.         Has used zofran in the past for N/V with her pregnancy but didn't feel that it helped all that much.   Has had three days in a row recently when she didn't have any nausea.   Willing to try unisom and b 6 and reglan to see if that will help.   Will plan to come back in a week for a weight and med check

## 2021-07-29 LAB
BACTERIA UR CULT: NO GROWTH
C TRACH DNA SPEC QL NAA+PROBE: POSITIVE
HBV SURFACE AG SERPL QL IA: NONREACTIVE
HCV AB SERPL QL IA: NONREACTIVE
HIV 1+2 AB+HIV1 P24 AG SERPL QL IA: NONREACTIVE
N GONORRHOEA DNA SPEC QL NAA+PROBE: NEGATIVE
RUBV IGG SERPL QL IA: 14.9 INDEX
RUBV IGG SERPL QL IA: POSITIVE
T PALLIDUM AB SER QL: NONREACTIVE

## 2021-07-30 PROBLEM — A56.8 CHLAMYDIA TRACHOMATIS INFECTION IN MOTHER DURING FIRST TRIMESTER OF PREGNANCY: Status: ACTIVE | Noted: 2021-07-30

## 2021-07-30 PROBLEM — O98.311 CHLAMYDIA TRACHOMATIS INFECTION IN MOTHER DURING FIRST TRIMESTER OF PREGNANCY: Status: ACTIVE | Noted: 2021-07-30

## 2021-07-30 RX ORDER — AZITHROMYCIN 500 MG/1
1000 TABLET, FILM COATED ORAL DAILY
Qty: 2 TABLET | Refills: 0 | Status: SHIPPED | OUTPATIENT
Start: 2021-07-30 | End: 2021-07-31

## 2021-07-31 ENCOUNTER — HEALTH MAINTENANCE LETTER (OUTPATIENT)
Age: 31
End: 2021-07-31

## 2021-08-04 ENCOUNTER — PRENATAL OFFICE VISIT (OUTPATIENT)
Dept: OBGYN | Facility: CLINIC | Age: 31
End: 2021-08-04
Payer: COMMERCIAL

## 2021-08-04 VITALS
BODY MASS INDEX: 22.15 KG/M2 | DIASTOLIC BLOOD PRESSURE: 77 MMHG | HEART RATE: 71 BPM | SYSTOLIC BLOOD PRESSURE: 134 MMHG | WEIGHT: 133.1 LBS | OXYGEN SATURATION: 99 %

## 2021-08-04 DIAGNOSIS — A74.9 POSITIVE CHLAMYDIA PCR: Primary | ICD-10-CM

## 2021-08-04 PROCEDURE — 99207 PR PRENATAL VISIT: CPT | Performed by: ADVANCED PRACTICE MIDWIFE

## 2021-08-04 RX ORDER — AZITHROMYCIN 500 MG/1
2000 TABLET, FILM COATED ORAL DAILY
Qty: 4 TABLET | Refills: 0 | Status: SHIPPED | OUTPATIENT
Start: 2021-08-04 | End: 2021-08-04

## 2021-08-04 RX ORDER — AZITHROMYCIN 500 MG/1
1000 TABLET, FILM COATED ORAL DAILY
Qty: 2 TABLET | Refills: 0 | Status: SHIPPED | OUTPATIENT
Start: 2021-08-04 | End: 2021-08-05

## 2021-08-04 ASSESSMENT — PAIN SCALES - GENERAL: PAINLEVEL: NO PAIN (0)

## 2021-08-04 NOTE — PROGRESS NOTES
Here today for a weight and med check.  Took the zithromax for chlamydia and threw up immediately after.   Will reorder.   Partner is out of [place] and getting tested soon.  Encouraged not to have sex with him until 7 days after treatment.  Weight is increasing and feels that the medications are working  RTC 4 weeks.   Maris Nguyen, MINH, CNM

## 2021-08-16 ENCOUNTER — TRANSFERRED RECORDS (OUTPATIENT)
Dept: HEALTH INFORMATION MANAGEMENT | Facility: CLINIC | Age: 31
End: 2021-08-16

## 2021-08-23 ENCOUNTER — TELEPHONE (OUTPATIENT)
Dept: OBGYN | Facility: OTHER | Age: 31
End: 2021-08-23

## 2021-08-23 NOTE — TELEPHONE ENCOUNTER
RN received normal (negative) [facility] NIPS results. Fetal sex is blacked out on the form.    Pt informed of results on 8/23/2021 by EDWARD.    RANDOLPH placed form in STAT scanning.    Patricia Lackey RN on 8/23/2021 at 2:32 PM

## 2021-09-10 ENCOUNTER — TELEPHONE (OUTPATIENT)
Dept: OBGYN | Facility: CLINIC | Age: 31
End: 2021-09-10

## 2021-09-10 NOTE — TELEPHONE ENCOUNTER
"Patient called.  She is asking to speak with the provider regarding the positive chlamydia results.  She is wondering if it could be a false positive. She kept stating \"I don't understand how this could have happened\".  She states she and her partner are monogamous.  She was referring to timelines of when she was tested and her partner was negative.     Reviewed CDC guidelines and there is no documentation regarding false positives.    Routing to provider to please advise when able.    Fela Cole RN, [facility]    "

## 2021-09-13 NOTE — TELEPHONE ENCOUNTER
Returned call.   Discussed that her test could have been negative in April.   It is much harder for a male to get chlamydia vs female.   Understandably not happy with these answers.   Maris Nguyen, MINH, CNM

## 2021-09-21 ENCOUNTER — TRANSFERRED RECORDS (OUTPATIENT)
Dept: HEALTH INFORMATION MANAGEMENT | Facility: CLINIC | Age: 31
End: 2021-09-21
Payer: COMMERCIAL

## 2021-09-25 ENCOUNTER — HEALTH MAINTENANCE LETTER (OUTPATIENT)
Age: 31
End: 2021-09-25

## 2021-10-01 ENCOUNTER — NURSE TRIAGE (OUTPATIENT)
Dept: NURSING | Facility: CLINIC | Age: 31
End: 2021-10-01

## 2021-10-01 NOTE — TELEPHONE ENCOUNTER
OB Triage Call      Is patient's OB/Midwife with the [facility] or [facility]? [facility]- Proceed with triage     Reason for call: back pain    Assessment: Patient calling says she is 19 weeks pregnant.  Says she has been having back pain all day.  It is on the lower left side of her back. Felt it more with walkign up stairs or getting in and out of car.  Rates pain 7-8/10 with movement.  No pain with no movement.  Has been seeing Maris Nguyen CNM.    Plan: Triaged to disposition of See Physician Within 24 Hours    Patient plans to deliver at [facility]    Patient's primary OB Provider is Maris Nguyen CNM.      Per protocol recommendations Patient to schedule follow up appointment within 24 hours.    Is patient's delivering hospital on divert? Does not apply due to Patient to schedule appointment       19w3d    Estimated Date of Delivery: 2022        OB History    Para Term  AB Living   4 2 1 1 1 3   SAB TAB Ectopic Multiple Live Births   1 0 0 1 3      # Outcome Date GA Lbr Mayank/2nd Weight Sex Delivery Anes PTL Lv   4 Current            3 Term 05 39w3d  2.807 kg (6 lb 3 oz) M    PARK   2A  17 33w4d 07:00 / 00:20 1.843 kg (4 lb 1 oz) M  EPI Y PARK      Complications:  premature rupture of membranes (PPROM) delivered, current hospitalization, Twin pregnancy, Preeclampsia in postpartum period      Name: Viraj   2B  17 33w4d  1.559 kg (3 lb 7 oz) M Vag-Breech EPI Y PARK      Name: Kaiser   1 SAB 10/24/16 11w6d    SAB         Birth Comments: no D&C       Lab Results   Component Value Date    GBS Negative 2020          Sandra Thurston, RN 10/01/21 6:44 PM  [facility] Nurse Advisor        Reason for Disposition    Flank pain  (Exception: pain that is only present with movement)    Additional Information    Negative: Shock suspected (e.g., cold/pale/clammy skin, too weak to stand, low BP, rapid pulse)    Negative: SEVERE abdominal  pain    Negative: Sounds like a life-threatening emergency to the triager    Negative: Major injury to the back (e.g., MVA, fall > 10 feet or 3 meters, penetrating injury, etc.)    Negative: Followed a tailbone injury    Negative: [1] Having contractions or other symptoms of labor AND [2] >= 37 weeks pregnant (i.e., term pregnancy)    Negative: [1] Having contractions or other symptoms of labor AND [2] < 37 weeks pregnant (i.e., )    Negative: [1] Abdominal pain AND [2] pregnant > 20 weeks    Negative: [1] Abdominal pain AND [2] pregnant < 20 weeks    Negative: [1] Pain in the upper back AND [2] overlies the rib cage    Negative: [1] Pain in the upper back AND [2] worsened by coughing (or clearly increases with breathing)    Negative: [1] Unable to urinate (or only a few drops) > 4 hours AND     [2] bladder feels very full (e.g., palpable bladder or strong urge to urinate)    Negative: Numbness in groin or rectal area (i.e., loss of sensation)    Negative: Leakage of fluid from vagina    Negative: [1] Pregnant 23 or more weeks AND [2] baby is moving less today (e.g., kick count < 5 in 1 hour or < 10 in 2 hours)    Negative: Unable to walk    Negative: Weakness of a leg or foot (e.g., unable to bear weight, dragging foot)    Negative: Patient sounds very sick or weak to the triager    Negative: [1] Flank pain (i.e., in side, below ribs and above hip) AND [2] fever > 100.4 F (38.0 C)    Negative: [1] SEVERE back pain (e.g., excruciating, unable to do any normal activities) AND [2] not improved 2 hours after pain medicine    Negative: [1] Pain radiates into the thigh or further down the leg AND [2] both legs    Protocols used: PREGNANCY - BACK PAIN-A-

## 2021-10-05 ENCOUNTER — PRENATAL OFFICE VISIT (OUTPATIENT)
Dept: OBGYN | Facility: CLINIC | Age: 31
End: 2021-10-05
Payer: COMMERCIAL

## 2021-10-05 VITALS
DIASTOLIC BLOOD PRESSURE: 77 MMHG | WEIGHT: 157 LBS | HEART RATE: 82 BPM | BODY MASS INDEX: 26.13 KG/M2 | SYSTOLIC BLOOD PRESSURE: 129 MMHG

## 2021-10-05 DIAGNOSIS — A74.9 POSITIVE CHLAMYDIA PCR: ICD-10-CM

## 2021-10-05 DIAGNOSIS — Z34.81 ENCOUNTER FOR SUPERVISION OF OTHER NORMAL PREGNANCY IN FIRST TRIMESTER: Primary | ICD-10-CM

## 2021-10-05 PROCEDURE — 87491 CHLMYD TRACH DNA AMP PROBE: CPT | Performed by: OBSTETRICS & GYNECOLOGY

## 2021-10-05 PROCEDURE — 87591 N.GONORRHOEAE DNA AMP PROB: CPT | Performed by: OBSTETRICS & GYNECOLOGY

## 2021-10-05 PROCEDURE — 99207 PR PRENATAL VISIT: CPT | Performed by: OBSTETRICS & GYNECOLOGY

## 2021-10-05 NOTE — PATIENT INSTRUCTIONS
If you have any questions regarding your visit, Please contact your care team.  [facility]: [phone]  Women s Health CLINIC HOURS TELEPHONE NUMBER   Cephas Agbeh, M.D. Becky-RN Kylie-RN Heidi-RN Deanna-MA Angela-  Tatiana-         Monday-Job    8:00a.m-4:45 p.m    Tuesday--[place]     8:00a.m-4:45 p.m.    Thursday-Job    8:00a.m-4:45 p.m.    Friday-Job    8:00a.m-4:45 p.m    [facility]   [address]   [place] [address]   [phone]   Fax [phone]   Doprpiq-[phone]     [facility]   [address] [place] [address] [place] [address]   [phone]    [facility]  [address] [facility] [address]  [phone]  [phone]   Urgent Care locations:    [facility] Monday-Friday  10 am - 8 pm  Saturday and Sunday   9 am - 5 pm   Monday-Friday   10 am- 8 pm  Saturday and Sunday  9 am - 5 pm    [phone]   If you need a medication refill, please contact your pharmacy. Please allow 3 business days for your refill to be completed.  As always, Thank you for trusting us with your healthcare needs!

## 2021-10-05 NOTE — PROGRESS NOTES
20w0d. Doing well without issues/concerns.  Quad screen not done per pt request .  Routine anticipatory guidance.  U/S  Ordered. return to clinic in 4 weeks.Nurse for exam. Cervic closed.    ICD-10-CM    1. Encounter for supervision of other normal pregnancy in first trimester  Z34.81 US OB > 14 Weeks   2. Positive Chlamydia PCR  A74.9 Chlamydia trachomatis PCR     Neisseria gonorrhoeae PCR     CEPHAS AGBEH, MD.      
detailed exam

## 2021-10-06 LAB
C TRACH DNA SPEC QL NAA+PROBE: NEGATIVE
N GONORRHOEA DNA SPEC QL NAA+PROBE: NEGATIVE

## 2021-10-08 ENCOUNTER — ANCILLARY PROCEDURE (OUTPATIENT)
Dept: ULTRASOUND IMAGING | Facility: OTHER | Age: 31
End: 2021-10-08
Attending: OBSTETRICS & GYNECOLOGY
Payer: COMMERCIAL

## 2021-10-08 DIAGNOSIS — Z34.81 ENCOUNTER FOR SUPERVISION OF OTHER NORMAL PREGNANCY IN FIRST TRIMESTER: ICD-10-CM

## 2021-10-08 PROCEDURE — 76805 OB US >/= 14 WKS SNGL FETUS: CPT | Performed by: RADIOLOGY

## 2021-12-01 ENCOUNTER — PRENATAL OFFICE VISIT (OUTPATIENT)
Dept: OBGYN | Facility: CLINIC | Age: 31
End: 2021-12-01
Payer: COMMERCIAL

## 2021-12-01 VITALS
DIASTOLIC BLOOD PRESSURE: 82 MMHG | SYSTOLIC BLOOD PRESSURE: 118 MMHG | WEIGHT: 169 LBS | BODY MASS INDEX: 28.12 KG/M2 | OXYGEN SATURATION: 100 % | HEART RATE: 98 BPM

## 2021-12-01 DIAGNOSIS — Z34.81 ENCOUNTER FOR SUPERVISION OF OTHER NORMAL PREGNANCY IN FIRST TRIMESTER: ICD-10-CM

## 2021-12-01 DIAGNOSIS — Z34.80 SUPERVISION OF OTHER NORMAL PREGNANCY, ANTEPARTUM: ICD-10-CM

## 2021-12-01 PROBLEM — A56.8 CHLAMYDIA TRACHOMATIS INFECTION IN MOTHER DURING FIRST TRIMESTER OF PREGNANCY: Status: RESOLVED | Noted: 2021-07-30 | Resolved: 2021-12-01

## 2021-12-01 PROBLEM — O98.311 CHLAMYDIA TRACHOMATIS INFECTION IN MOTHER DURING FIRST TRIMESTER OF PREGNANCY: Status: RESOLVED | Noted: 2021-07-30 | Resolved: 2021-12-01

## 2021-12-01 LAB
ABO/RH(D): NORMAL
GLUCOSE 1H P 50 G GLC PO SERPL-MCNC: 77 MG/DL (ref 70–129)
HGB BLD-MCNC: 11.1 G/DL (ref 11.7–15.7)
SPECIMEN EXPIRATION DATE: NORMAL

## 2021-12-01 PROCEDURE — 99207 PR PRENATAL VISIT: CPT | Performed by: OBSTETRICS & GYNECOLOGY

## 2021-12-01 PROCEDURE — 85018 HEMOGLOBIN: CPT | Performed by: OBSTETRICS & GYNECOLOGY

## 2021-12-01 PROCEDURE — 86901 BLOOD TYPING SEROLOGIC RH(D): CPT | Performed by: OBSTETRICS & GYNECOLOGY

## 2021-12-01 PROCEDURE — 86900 BLOOD TYPING SEROLOGIC ABO: CPT | Performed by: OBSTETRICS & GYNECOLOGY

## 2021-12-01 PROCEDURE — 82950 GLUCOSE TEST: CPT | Performed by: OBSTETRICS & GYNECOLOGY

## 2021-12-01 PROCEDURE — 36415 COLL VENOUS BLD VENIPUNCTURE: CPT | Performed by: OBSTETRICS & GYNECOLOGY

## 2021-12-01 NOTE — PROGRESS NOTES
No significant signs, symptoms or concerns. Here with family.   Counseling included the following: Advice appropriate to gestational age reviewed including pertinent Checklist items. Discussed condition, tests and care plan including risks, benefits, and alternatives. Problem list updated.   20 minutes spent on the date of encounter doing chart review, history, examination, discussion with patient, and documentation, and further activities as noted, and review of appropriateness of decision-making for care, and review of test results, and interpretation of test results.  A/P:  Shwetha was seen today for prenatal care.    Diagnoses and all orders for this visit:    Encounter for supervision of other normal pregnancy in first trimester  -     Glucose tolerance gest screen 1 hour  -     ABO and Rh  -     Hemoglobin    Supervision of other normal pregnancy, antepartum        Barron Redding MD

## 2021-12-01 NOTE — PATIENT INSTRUCTIONS
If you have any questions regarding your visit, Please contact your care team.     [facility]: [phone]  Women s Health CLINIC HOURS TELEPHONE NUMBER       Barron Redding M.D.    Patricia-RANDOLPH Yeh-RANDOLPH Chen-Medical Assistant    Ginger-  Tatiana-     Monday-[place]  8:00a.m-4:45 p.m  Tuesday-[place]  9:00a.m-4:00 p.m  Wednesday-[place] 8:00a.m-4:45 p.m.  Thursday-[place]  8:00a.m-4:45 p.m.  Friday-[place]  8:00a.m-4:45 p.m. [facility]  [address] [place] [address] [phone] ask for [facility]  [phone] Fax  Imaging [phone]    [facility]  [address]  [place], [place] [address] [phone]    [facility]  [address] [place]  [place] [address] [phone] ask [facility]  [phone] Fax  Imaging [phone]     Urgent Care locations:    [place]        [place] Monday-Friday  10 am - 8 pm  Saturday and Sunday   9 am - 5 pm  Monday-Friday   10 am - 8 pm  Saturday and Sunday   9 am - 5 pm   [phone]   If you need a medication refill, please contact your pharmacy. Please allow 3 business days for your refill to be completed.  As always, Thank you for trusting us with your healthcare needs!

## 2022-01-27 ENCOUNTER — PRENATAL OFFICE VISIT (OUTPATIENT)
Dept: OBGYN | Facility: CLINIC | Age: 32
End: 2022-01-27
Payer: COMMERCIAL

## 2022-01-27 VITALS
SYSTOLIC BLOOD PRESSURE: 128 MMHG | DIASTOLIC BLOOD PRESSURE: 77 MMHG | BODY MASS INDEX: 29.4 KG/M2 | WEIGHT: 176.7 LBS | HEART RATE: 92 BPM

## 2022-01-27 DIAGNOSIS — D57.3 SICKLE CELL TRAIT (H): ICD-10-CM

## 2022-01-27 DIAGNOSIS — Z23 NEED FOR VACCINATION: ICD-10-CM

## 2022-01-27 DIAGNOSIS — Z34.80 SUPERVISION OF OTHER NORMAL PREGNANCY, ANTEPARTUM: Primary | ICD-10-CM

## 2022-01-27 PROCEDURE — 90715 TDAP VACCINE 7 YRS/> IM: CPT | Performed by: OBSTETRICS & GYNECOLOGY

## 2022-01-27 PROCEDURE — 87491 CHLMYD TRACH DNA AMP PROBE: CPT | Performed by: OBSTETRICS & GYNECOLOGY

## 2022-01-27 PROCEDURE — 87591 N.GONORRHOEAE DNA AMP PROB: CPT | Performed by: OBSTETRICS & GYNECOLOGY

## 2022-01-27 PROCEDURE — 90471 IMMUNIZATION ADMIN: CPT | Performed by: OBSTETRICS & GYNECOLOGY

## 2022-01-27 PROCEDURE — 87653 STREP B DNA AMP PROBE: CPT | Performed by: OBSTETRICS & GYNECOLOGY

## 2022-01-27 PROCEDURE — 99207 PR PRENATAL VISIT: CPT | Performed by: OBSTETRICS & GYNECOLOGY

## 2022-01-27 RX ORDER — ACETAMINOPHEN 325 MG/1
650 TABLET ORAL
COMMUNITY
End: 2022-08-14

## 2022-01-27 NOTE — PROGRESS NOTES
"Presents for routine  appointment.    Sometimes hears a \"popping\" noise when turning from side to side. No pain.     No LOF/VB/Ctxs.  +FM  ROS:   and GI  negative.     /77   Pulse 92   Wt 80.2 kg (176 lb 11.2 oz)   LMP 2021 (Exact Date)   BMI 29.40 kg/m      TA BSUS- Cephalic presentation      A/P:  31 year old  at 36w2d CONY    Pregnancy Complications:  -Chlamydia 21, ALICIA negative. Testing repeated with GBS today.  -SST, partner not tested. Last UA 10/1. UA/UCx today. Has not on ASA this pregnancy.  -Intermittent PNC, last visit at 28wks    Routine Prenatal Care:  -Group B Strep collected today  -Discussed labor and what to expect. Discussed when to go to the birth center.  -Contraception: Declines at this time but will consider. Discussed that there are hormonal and non hormonal options available if she desires any  -Tdap today  -Declines flu and covid vaccines. Willing to consider covid vaccine PP    Follow up in 1 week.    Sherie Howell, DO      "

## 2022-01-27 NOTE — PROGRESS NOTES
Prior to immunization administration, verified patients identity using patient s name and date of birth. Please see Immunization Activity for additional information.     Screening Questionnaire for Adult Immunization    Are you sick today?   No   Do you have allergies to medications, food, a vaccine component or latex?   No   Have you ever had a serious reaction after receiving a vaccination?   No   Do you have a long-term health problem with heart, lung, kidney, or metabolic disease (e.g., diabetes), asthma, a blood disorder, no spleen, complement component deficiency, a cochlear implant, or a spinal fluid leak?  Are you on long-term aspirin therapy?   No   Do you have cancer, leukemia, HIV/AIDS, or any other immune system problem?   Yes   Do you have a parent, brother, or sister with an immune system problem?   No   In the past 3 months, have you taken medications that affect  your immune system, such as prednisone, other steroids, or anticancer drugs; drugs for the treatment of rheumatoid arthritis, Crohn s disease, or psoriasis; or have you had radiation treatments?   No   Have you had a seizure, or a brain or other nervous system problem?   No   During the past year, have you received a transfusion of blood or blood    products, or been given immune (gamma) globulin or antiviral drug?   No   For women: Are you pregnant or is there a chance you could become       pregnant during the next month?   Yes   Have you received any vaccinations in the past 4 weeks?   No     Immunization questionnaire was positive for at least one answer.  Dr. Howell notified of Sickel Cell Trait.        Per orders of Dr. Howell, injection of Tdap given by Rita Roa. Patient instructed to remain in clinic for 15 minutes afterwards, and to report any adverse reaction to me immediately.       Screening performed by Daxa Vines MA on 1/27/2022 at 2:51 PM.

## 2022-01-28 LAB
C TRACH DNA SPEC QL NAA+PROBE: NEGATIVE
GP B STREP DNA SPEC QL NAA+PROBE: NEGATIVE
N GONORRHOEA DNA SPEC QL NAA+PROBE: NEGATIVE
PATIENT PENICILLIN, AMOXICILLIN, CEPHALOSPORINS ALLERGY: NO

## 2022-02-02 ENCOUNTER — PRENATAL OFFICE VISIT (OUTPATIENT)
Dept: OBGYN | Facility: CLINIC | Age: 32
End: 2022-02-02
Payer: COMMERCIAL

## 2022-02-02 VITALS
DIASTOLIC BLOOD PRESSURE: 72 MMHG | WEIGHT: 177 LBS | BODY MASS INDEX: 29.45 KG/M2 | OXYGEN SATURATION: 99 % | SYSTOLIC BLOOD PRESSURE: 120 MMHG | HEART RATE: 73 BPM

## 2022-02-02 DIAGNOSIS — O36.8130 DECREASED FETAL MOVEMENT AFFECTING MANAGEMENT OF PREGNANCY IN THIRD TRIMESTER, SINGLE OR UNSPECIFIED FETUS: ICD-10-CM

## 2022-02-02 DIAGNOSIS — Z34.80 SUPERVISION OF OTHER NORMAL PREGNANCY, ANTEPARTUM: Primary | ICD-10-CM

## 2022-02-02 PROCEDURE — 99207 PR PRENATAL VISIT: CPT | Performed by: ADVANCED PRACTICE MIDWIFE

## 2022-02-02 PROCEDURE — 59025 FETAL NON-STRESS TEST: CPT | Performed by: ADVANCED PRACTICE MIDWIFE

## 2022-02-02 ASSESSMENT — PAIN SCALES - GENERAL: PAINLEVEL: NO PAIN (0)

## 2022-02-02 NOTE — PROGRESS NOTES
Feeling weak and wondering if her iron is acting up  Has been taking iron.  Hasn't felt the baby move yet today.   NST for decreased FM Reactive  [id number] with mod FHRV accels no decels no contra.  No contra/lof/vb  RTC 1 wk.\Maris Nguyen, MINH, CNM

## 2022-02-10 ENCOUNTER — PRENATAL OFFICE VISIT (OUTPATIENT)
Dept: OBGYN | Facility: CLINIC | Age: 32
End: 2022-02-10
Payer: COMMERCIAL

## 2022-02-10 ENCOUNTER — NURSE TRIAGE (OUTPATIENT)
Dept: NURSING | Facility: CLINIC | Age: 32
End: 2022-02-10

## 2022-02-10 VITALS
OXYGEN SATURATION: 100 % | BODY MASS INDEX: 30.17 KG/M2 | WEIGHT: 181.3 LBS | SYSTOLIC BLOOD PRESSURE: 117 MMHG | DIASTOLIC BLOOD PRESSURE: 76 MMHG | HEART RATE: 93 BPM

## 2022-02-10 DIAGNOSIS — Z34.80 SUPERVISION OF OTHER NORMAL PREGNANCY, ANTEPARTUM: ICD-10-CM

## 2022-02-10 PROCEDURE — 99207 PR PRENATAL VISIT: CPT | Performed by: OBSTETRICS & GYNECOLOGY

## 2022-02-10 NOTE — TELEPHONE ENCOUNTER
Pt called in states she is 38 weeks pregnant.  The Pt states she fall on the stair 30 min ago.  The step hit her back.  There is body ache and sore on her pelvic area.  The had pelvic exam today.  No constant pain.  No bleeding or open skin.  The mother states she can not tell about the baby movement.  No vaginal bleeding or leak.  The Pt feel little pain on her upper rib.  The Pt states has pain on her shoulder.  The disposition is to be seen at the ED.  Care advice given per protocol.  Patient agrees with care advice given.   Agreed to call back if he has additional symptoms or questions.    Lewis Salazar Redfield Nurse Advisor 2/10/2022 6:10 PM        Reason for Disposition    Shoulder pain    [1] Pregnant 20 or more weeks AND [2] fall to ground or floor (e.g., walking and tripped)    Additional Information    Negative: Major injury from dangerous force or speed (e.g., MVA, fall > 10 feet or 3 meters)    Negative: Bullet or knife wound    Negative: Puncture wound that sounds life-threatening to the triager    Negative: [1] Major bleeding (e.g., actively dripping or spurting) AND [2] can't be stopped    Negative: Shock suspected (e.g., cold/pale/clammy skin, too weak to stand, low BP, rapid pulse)    Negative: Deep wound of abdomen (e.g., can see intestines)    Negative: Difficulty breathing    Negative: SEVERE abdominal pain    Negative: [1] Vaginal bleeding AND [2] pregnant > 20 weeks    Negative: Sounds like a life-threatening emergency to the triager    Negative: [1] Abdominal pain not from an injury AND [2] pregnant < 20 weeks    Negative: [1] Abdominal pain not from an injury AND [2] pregnant > 20 weeks    Negative: Wound looks infected    Negative: [1] Vaginal bleeding AND [2] pregnant < 20 weeks (Exception: single episode of spotting)    Negative: Blood in urine (red, pink, or tea-colored)    Negative: Blood in vomit or from rectum    Negative: [1] Vomiting AND [2] 2 or more times    Negative: Skin is split  open or gaping (or length > 1/2 inch or 12 mm)    Negative: [1] Bleeding AND [2] won't stop after 10 minutes of direct pressure (using correct technique)    Negative: [1] Dirt in the wound AND [2] not removed with 15 minutes of scrubbing    Negative: [1] Abdominal pain (not severe) AND [2] present > 1 hour    Negative: Sounds like a serious injury to the triager    Protocols used: PREGNANCY - ABDOMEN INJURY-A-AH

## 2022-02-10 NOTE — PROGRESS NOTES
Presents for routine  appointment.     No LOF/VB/Ctxs.  +FM  ROS:   and GI  negative.     /76 (BP Location: Right arm, Patient Position: Chair, Cuff Size: Adult Regular)   Pulse 93   Wt 82.2 kg (181 lb 4.8 oz)   LMP 2021 (Exact Date)   SpO2 100%   BMI 30.17 kg/m      A/P:  31 year old  at 38w2d CONY    Pregnancy Complications:  -Chlamydia 21, ALICIA negative. Testing repeated at 36wks and neg  -SST, partner not tested. Last UA 10/1. UA/UCx neg at 36wks. Has not on ASA this pregnancy.  -Intermittent PNC    Routine Prenatal Care:  -Group B Strep neg  -Discussed labor and what to expect. Discussed when to go to the birth center.  -Contraception: Declines at this time but will consider. Discussed that there are hormonal and non hormonal options available if she desires any  -Declines flu and covid vaccines. Willing to consider covid vaccine PP    Follow up in 1 week.    Sherie Howell, DO

## 2022-02-25 ENCOUNTER — TELEPHONE (OUTPATIENT)
Dept: OBGYN | Facility: OTHER | Age: 32
End: 2022-02-25
Payer: COMMERCIAL

## 2022-02-25 ENCOUNTER — PRENATAL OFFICE VISIT (OUTPATIENT)
Dept: OBGYN | Facility: CLINIC | Age: 32
End: 2022-02-25
Payer: COMMERCIAL

## 2022-02-25 VITALS
BODY MASS INDEX: 30.12 KG/M2 | OXYGEN SATURATION: 100 % | DIASTOLIC BLOOD PRESSURE: 91 MMHG | SYSTOLIC BLOOD PRESSURE: 131 MMHG | HEART RATE: 78 BPM | WEIGHT: 181 LBS

## 2022-02-25 DIAGNOSIS — N89.8 VAGINAL DISCHARGE: Primary | ICD-10-CM

## 2022-02-25 DIAGNOSIS — B96.89 BV (BACTERIAL VAGINOSIS): Primary | ICD-10-CM

## 2022-02-25 DIAGNOSIS — N76.0 BV (BACTERIAL VAGINOSIS): Primary | ICD-10-CM

## 2022-02-25 LAB
CLUE CELLS: PRESENT
TRICHOMONAS, WET PREP: ABNORMAL
WBC'S/HIGH POWER FIELD, WET PREP: ABNORMAL
YEAST, WET PREP: ABNORMAL

## 2022-02-25 PROCEDURE — 99213 OFFICE O/P EST LOW 20 MIN: CPT | Performed by: OBSTETRICS & GYNECOLOGY

## 2022-02-25 PROCEDURE — 87210 SMEAR WET MOUNT SALINE/INK: CPT | Performed by: OBSTETRICS & GYNECOLOGY

## 2022-02-25 RX ORDER — METRONIDAZOLE 7.5 MG/G
1 GEL VAGINAL DAILY
Qty: 25 G | Refills: 0 | Status: SHIPPED | OUTPATIENT
Start: 2022-02-25 | End: 2022-03-02

## 2022-02-25 NOTE — PROGRESS NOTES
OB/GYN      NAME:  Shwetha Issa  PCP:  Shanti Benites  MRN:  [id number]    Impression / Plan     31 year old  with:      ICD-10-CM    1. Vaginal discharge  N89.8 Wet prep - Clinic Collect       I will contact the patient with results and follow up recommendations.  She is currently breast feeding.  Discussed OTC medications if wet prep is normal.  Discussed medical options given her breast feeding status if she has a positive test result.     Chief Complaint     Chief Complaint   Patient presents with     Vaginal Problem       HPI     Shwetha Issa is a  31 year old female who is seen for vaginal discharge.  She called in today with concerns about vaginal odor, yellow discharge, sometimes brown discharge. Some vaginal itching.    She had a vaginal delivery 22.  No concerns aside from the odor/discharge.  Lochia normal.  No fevers/chills.  No unusual abdominal pain .  No myalgia   .      Patient's last menstrual period was 2021 (exact date).         Problem List     Patient Active Problem List    Diagnosis Date Noted     Supervision of other normal pregnancy, antepartum 2021     Priority: Medium     Girl!  NIPT neg.       Sickle cell trait (H) 10/18/2016     Priority: Medium     CARDIOVASCULAR SCREENING; LDL GOAL LESS THAN 160 2011     Priority: Medium       Medications     Current Outpatient Medications   Medication     Prenatal Vit-Fe Fumarate-FA (PRENATAL VITAMIN PO)     acetaminophen (TYLENOL) 325 MG tablet     No current facility-administered medications for this visit.        Allergies     No Known Allergies    ROS     Pertinent positives and negatives are listed in the HPI.     Physical Exam   Vitals: BP (!) 131/91 (BP Location: Right arm, Cuff Size: Adult Regular)   Pulse 78   Wt 82.1 kg (181 lb)   LMP 2021 (Exact Date)   SpO2 100%   Breastfeeding Yes   BMI 30.12 kg/m      General: Comfortable, no obvious distress  : Normal female external genitalia.  No  lesions.  Urethral meatus normal.  Speculum exam reveals a normal vaginal vault, normal cervix. Small amount of discharge.  Wet prep collected. Bimanual exam reveals a normal, mobile, nontender uterus.  No cervical motion tenderness.  Adnexa nontender with no palpable masses.     Extremities: No peripheral edema, nontender       Labs/Imaging       I have personally reviewed the labs/imaging and the findings were:          Mandi Perez MD     One acute problem, discussed medical management.

## 2022-02-25 NOTE — TELEPHONE ENCOUNTER
Delivered 2/11/22, vaginal delivery.    States has a foul vaginal odor (not fishy) and sometime yellow discharge, sometimes brown discharge.  Has some vaginal itching. Denies burning or pain w/urination, or fever.    Recommended pt be seen in clinic. RN offered appt in [place], but  Pt prefers [place] location.     Teams message sent to provider for a 3:45pm (15min time slot) to ask approval to schedule pt for that time slot.    Okay to leave detailed message.    Lisa Weems, GERRIN RN

## 2022-02-25 NOTE — TELEPHONE ENCOUNTER
Provider approved 15min appt for today.      RN called pt to assist in scheduling appt. Pt verbalized understanding and has no questions.    GERRI ThomasonN RN

## 2022-03-29 ENCOUNTER — PRENATAL OFFICE VISIT (OUTPATIENT)
Dept: OBGYN | Facility: CLINIC | Age: 32
End: 2022-03-29
Payer: COMMERCIAL

## 2022-03-29 VITALS
SYSTOLIC BLOOD PRESSURE: 126 MMHG | WEIGHT: 166.2 LBS | DIASTOLIC BLOOD PRESSURE: 87 MMHG | HEART RATE: 52 BPM | BODY MASS INDEX: 27.66 KG/M2

## 2022-03-29 DIAGNOSIS — N89.8 VAGINAL DISCHARGE: ICD-10-CM

## 2022-03-29 DIAGNOSIS — Z30.011 ENCOUNTER FOR INITIAL PRESCRIPTION OF CONTRACEPTIVE PILLS: ICD-10-CM

## 2022-03-29 LAB
CLUE CELLS: PRESENT
TRICHOMONAS, WET PREP: ABNORMAL
WBC'S/HIGH POWER FIELD, WET PREP: ABNORMAL
YEAST, WET PREP: PRESENT

## 2022-03-29 PROCEDURE — G0145 SCR C/V CYTO,THINLAYER,RESCR: HCPCS | Performed by: OBSTETRICS & GYNECOLOGY

## 2022-03-29 PROCEDURE — 87624 HPV HI-RISK TYP POOLED RSLT: CPT | Performed by: OBSTETRICS & GYNECOLOGY

## 2022-03-29 PROCEDURE — 87210 SMEAR WET MOUNT SALINE/INK: CPT | Performed by: OBSTETRICS & GYNECOLOGY

## 2022-03-29 PROCEDURE — 99207 PR POST PARTUM EXAM: CPT | Performed by: OBSTETRICS & GYNECOLOGY

## 2022-03-29 RX ORDER — METRONIDAZOLE 500 MG/1
500 TABLET ORAL 2 TIMES DAILY
Qty: 14 TABLET | Refills: 0 | Status: SHIPPED | OUTPATIENT
Start: 2022-03-29 | End: 2022-04-05

## 2022-03-29 RX ORDER — FLUCONAZOLE 150 MG/1
150 TABLET ORAL ONCE
Qty: 1 TABLET | Refills: 0 | Status: SHIPPED | OUTPATIENT
Start: 2022-03-29 | End: 2022-03-29

## 2022-03-29 RX ORDER — DESOGESTREL AND ETHINYL ESTRADIOL 0.15-0.03
1 KIT ORAL DAILY
Qty: 90 TABLET | Refills: 3 | Status: SHIPPED | OUTPATIENT
Start: 2022-03-29 | End: 2022-11-17

## 2022-03-29 ASSESSMENT — PATIENT HEALTH QUESTIONNAIRE - PHQ9: SUM OF ALL RESPONSES TO PHQ QUESTIONS 1-9: 0

## 2022-03-29 NOTE — PATIENT INSTRUCTIONS
If you have any questions regarding your visit, Please contact your care team.  [employer]: [phone]  [facility] CLINIC HOURS TELEPHONE NUMBER   Cephas Agbeh, M.D. Becky-RN Kylie-RN Heidi-RN Deanna-MA Angela-  Tatiana-         Monday-Job    8:00a.m-4:45 p.m    Tuesday--[place]     8:00a.m-4:45 p.m.    Thursday-Job    8:00a.m-4:45 p.m.    Friday-Job    8:00a.m-4:45 p.m    [facility]   [address]   [place] [address]   [phone]   Fax [phone]   Cfiefud-[phone]     [facility] Delivery   [address] [place] [address] [place] [address]   [phone]    [facility]  [address] [facility] [address]  [phone]  [phone]   Urgent Care locations:    [facility] Monday-Friday  10 am - 8 pm  Saturday and Sunday   9 am - 5 pm   Monday-Friday   10 am- 8 pm  Saturday and Sunday  9 am - 5 pm    [phone]   If you need a medication refill, please contact your pharmacy. Please allow 3 business days for your refill to be completed.  As always, Thank you for trusting us with your healthcare needs!

## 2022-03-29 NOTE — PROGRESS NOTES
Shwetha is here for a 6-week postpartum checkup.    She had a  of a liveborn baby girl, weight 7 pounds 6 oz.  The delivery was uncomplicated.  Since delivery, she has not been breast feeding.  She has had a normal menses.  She has not had intercourse.  Patient screened for postpartum depression and complaints are NEGATIVE. Screening has also been completed for intimate partner violence.  Wants BV recheck.        EXAM: /87   Pulse 52   Wt 75.4 kg (166 lb 3.2 oz)   LMP 2021 (Exact Date)   Breastfeeding No   BMI 27.66 kg/m      HEENT: grossly normal.  NECK: no lymphadenopathy or thyromegaly.  ABDOMEN: soft, non tender, good bowel sounds, without masses, rebound, guarding or tenderness.    EXTREMITIES: Warm to touch, no ankle edema or calf tenderness.    PELVIC:    External genitalia: normal without lesion,  perineum well healed   Vagina: normal mucosa and rugae, normal discharge.  Cervix: normal without lesion.  Uterus: small, mobile, nontender.  Adnexa: No masses, No tenderness  Rectal: deferred, external hemorrhoids present  Results for orders placed or performed in visit on 22   Wet prep - Clinic Collect     Status: Abnormal    Specimen: Vagina; Swab   Result Value Ref Range    Trichomonas Absent Absent    Yeast Present (A) Absent    Clue Cells Present (A) Absent    WBCs/high power field 3+ (A) None     Nurse for exam  A/P  Routine Postpartum    ICD-10-CM    1. Routine postpartum follow-up  Z39.2 desogestrel-ethinyl estradiol (APRI) 0.15-30 MG-MCG tablet     Wet prep - Clinic Collect     Pap imaged thin layer screen with HPV - recommended age 30 - 65 years (select HPV order below)   2. Vaginal discharge  N89.8 Wet prep - Clinic Collect     fluconazole (DIFLUCAN) 150 MG tablet     metroNIDAZOLE (FLAGYL) 500 MG tablet   3. Encounter for initial prescription of contraceptive pills  Z30.011 desogestrel-ethinyl estradiol (APRI) 0.15-30 MG-MCG tablet       1. Contraception: combination pills    2. Annual due in  every 12 months    CEPHAS AGBEH, MD.

## 2022-04-01 LAB
BKR LAB AP GYN ADEQUACY: NORMAL
BKR LAB AP GYN INTERPRETATION: NORMAL
BKR LAB AP HPV REFLEX: NORMAL
BKR LAB AP PREVIOUS ABNORMAL: NORMAL
PATH REPORT.COMMENTS IMP SPEC: NORMAL
PATH REPORT.COMMENTS IMP SPEC: NORMAL
PATH REPORT.RELEVANT HX SPEC: NORMAL

## 2022-04-05 LAB
HUMAN PAPILLOMA VIRUS 16 DNA: NEGATIVE
HUMAN PAPILLOMA VIRUS 18 DNA: NEGATIVE
HUMAN PAPILLOMA VIRUS FINAL DIAGNOSIS: NORMAL
HUMAN PAPILLOMA VIRUS OTHER HR: NEGATIVE

## 2022-05-05 ENCOUNTER — DOCUMENTATION ONLY (OUTPATIENT)
Dept: LAB | Facility: CLINIC | Age: 32
End: 2022-05-05
Payer: COMMERCIAL

## 2022-05-05 DIAGNOSIS — N89.8 VAGINAL DISCHARGE: Primary | ICD-10-CM

## 2022-05-05 NOTE — PROGRESS NOTES
Hi, looks like patient is coming in for wet prep 05/06/2022 but there are no orders. Can you please place order for appointment.    Thank You Ketty BOLAÑOS   no

## 2022-05-06 ENCOUNTER — NURSE TRIAGE (OUTPATIENT)
Dept: NURSING | Facility: CLINIC | Age: 32
End: 2022-05-06

## 2022-05-06 ENCOUNTER — TELEPHONE (OUTPATIENT)
Dept: OBGYN | Facility: CLINIC | Age: 32
End: 2022-05-06

## 2022-05-06 DIAGNOSIS — N76.0 BACTERIAL VAGINOSIS: Primary | ICD-10-CM

## 2022-05-06 DIAGNOSIS — N89.8 VAGINAL DISCHARGE: Primary | ICD-10-CM

## 2022-05-06 DIAGNOSIS — B96.89 BACTERIAL VAGINOSIS: Primary | ICD-10-CM

## 2022-05-06 PROCEDURE — 87210 SMEAR WET MOUNT SALINE/INK: CPT | Performed by: OBSTETRICS & GYNECOLOGY

## 2022-05-06 RX ORDER — METRONIDAZOLE 500 MG/1
500 TABLET ORAL 2 TIMES DAILY
Qty: 14 TABLET | Refills: 0 | Status: SHIPPED | OUTPATIENT
Start: 2022-05-06 | End: 2022-05-13

## 2022-05-06 NOTE — TELEPHONE ENCOUNTER
Last seen 3/29/22 for 6 week f/u postpartum care of .    On 3/29/22, was also treated with diflucan and flagyl for yeast and BV.    Spoke with pt on the phone regarding her concerns.    Pt states symptoms started after period ended 4-5 days ago.  Is with fishy vaginal odor, vaginal discharge is creamy yellow,.    Denies vaginal burning, but is with vaginal discomfort.  (Had intercourse day after period ended).    Went to urgent care, was a 2 hour wait. Pt would like lab only orders for wet prep if possible.    Preferred pharmacy is [facility] [place] [facility] (T'ed up by RN).    Ok leave detailed message.    Routing to provider pool for wet prep lab only orders as appropriate.    Lisa Weems, BSN RN

## 2022-05-06 NOTE — TELEPHONE ENCOUNTER
[facility] Call Center    Phone Message    May a detailed message be left on voicemail: no     Reason for Call: Other: Pt thinks she has a yeast infection or another infection.  Pt asking for a lab order to get this.  Please call asap.      Action Taken: Message routed to:  [facility] p [phone]    Travel Screening: Not Applicable

## 2022-05-06 NOTE — TELEPHONE ENCOUNTER
[facility] Call Center    Phone Message    May a detailed message be left on voicemail: yes     Reason for Call: Shwetha is calling again for an update. She would like to know someone before the weekend.     Action Taken: Message routed to:  [facility] p [phone]    Travel Screening: Not Applicable

## 2022-05-06 NOTE — TELEPHONE ENCOUNTER
Wet prep ordered, but she may need to go to urgent care if she would like this addressed over the weekend.

## 2022-05-06 NOTE — TELEPHONE ENCOUNTER
RN assisted in scheduling lab appt for 6pm tonight.      Routing to provider for awareness if is able to treat pt over the weekend if needed.  Pharmacy t'ed up.    Lisa Weems, BSN RN

## 2022-05-07 NOTE — TELEPHONE ENCOUNTER
"Pt calling about results of wet prep which show clue cells and 2+ WBC's.     Writer paged on call provider Dr. Agbeh who ordered metronidazole 500mg twice daily for seven days #14 no refills. Pt must be seen in clinic if symptoms do not improve.     Writer sent prescription. Pt notified of the above information.     Reason for Disposition    [1] Request for URGENT new prescription or refill of \"essential\" medication (i.e., likelihood of harm to patient if not taken) AND [2] triager unable to fill per unit policy    Protocols used: MEDICATION QUESTION CALL-A-AH      "

## 2022-07-01 ENCOUNTER — MEDICAL CORRESPONDENCE (OUTPATIENT)
Dept: HEALTH INFORMATION MANAGEMENT | Facility: CLINIC | Age: 32
End: 2022-07-01

## 2022-08-14 ENCOUNTER — OFFICE VISIT (OUTPATIENT)
Dept: URGENT CARE | Facility: URGENT CARE | Age: 32
End: 2022-08-14
Payer: COMMERCIAL

## 2022-08-14 VITALS
BODY MASS INDEX: 23.27 KG/M2 | DIASTOLIC BLOOD PRESSURE: 92 MMHG | HEART RATE: 83 BPM | OXYGEN SATURATION: 98 % | TEMPERATURE: 97.7 F | SYSTOLIC BLOOD PRESSURE: 141 MMHG | WEIGHT: 144.8 LBS | HEIGHT: 66 IN

## 2022-08-14 DIAGNOSIS — N89.8 VAGINAL DISCHARGE: Primary | ICD-10-CM

## 2022-08-14 DIAGNOSIS — N94.9 VAGINAL DISCOMFORT: ICD-10-CM

## 2022-08-14 LAB
CLUE CELLS: ABNORMAL
TRICHOMONAS, WET PREP: ABNORMAL
WBC'S/HIGH POWER FIELD, WET PREP: ABNORMAL
YEAST, WET PREP: ABNORMAL

## 2022-08-14 PROCEDURE — 87210 SMEAR WET MOUNT SALINE/INK: CPT | Performed by: PHYSICIAN ASSISTANT

## 2022-08-14 PROCEDURE — 99213 OFFICE O/P EST LOW 20 MIN: CPT | Performed by: PHYSICIAN ASSISTANT

## 2022-08-14 RX ORDER — METRONIDAZOLE 500 MG/1
500 TABLET ORAL 2 TIMES DAILY
Qty: 14 TABLET | Refills: 0 | Status: SHIPPED | OUTPATIENT
Start: 2022-08-14 | End: 2022-08-21

## 2022-08-14 NOTE — PROGRESS NOTES
Chief Complaint   Patient presents with     Vaginal Problem     PH imbalance.        Results for orders placed or performed in visit on 08/14/22   Wet prep - lab collect     Status: Abnormal    Specimen: Vagina; Swab   Result Value Ref Range    Trichomonas Absent Absent    Yeast Absent Absent    Clue Cells Absent Absent    WBCs/high power field 1+ (A) None             ASSESSMENT:    ICD-10-CM    1. Vaginal discharge  N89.8 Wet prep - lab collect     Wet prep - lab collect     metroNIDAZOLE (FLAGYL) 500 MG tablet   2. Vaginal discomfort  N94.9 Wet prep - lab collect     Wet prep - lab collect     metroNIDAZOLE (FLAGYL) 500 MG tablet             PLAN: Test negative but not 100% accurate.  Will treat with Flagyl.  Just started probiotics and will see if this helps prevent BV in the future.  As per ordered above.  Drink plenty of fluids.  Prevention and treatment of UTI's discussed. Follow up with primary care physician if not improving.  Advised about symptoms which might herald more serious problems.          Dottie Ray PA-C        Subjective: 31-year-old female presents for rule out BV.  Has had a fishy vaginal odor.  No pelvic pain or back pain.  No urinary symptoms.  No fever.  History of recurrent BV.    No Known Allergies    Past Medical History:   Diagnosis Date     Chlamydia 2021     H/O colposcopy with cervical biopsy 05/10/2013    resolved     Intermittent asthma 2008     LSIL (low grade squamous intraepithelial lesion) on Pap smear 03/19/2013    resolved     Sickle cell trait (H)      Trichomonal vulvovaginitis 2012       desogestrel-ethinyl estradiol (APRI) 0.15-30 MG-MCG tablet, Take 1 tablet by mouth daily (Patient not taking: Reported on 8/14/2022)    No current facility-administered medications on file prior to visit.      Social History     Tobacco Use     Smoking status: Never Smoker     Smokeless tobacco: Never Used   Vaping Use     Vaping Use: Never used   Substance Use Topics     Alcohol use: Not  "Currently     Alcohol/week: 0.0 standard drinks     Comment: pregnant     Drug use: No       ROS:  General: negative for fever  ABD: Denies abd pain  : as above    OBJECTIVE:  BP (!) 141/92 (BP Location: Left arm, Patient Position: Sitting, Cuff Size: Adult Regular)   Pulse 83   Temp 97.7  F (36.5  C) (Tympanic)   Ht 1.664 m (5' 5.5\")   Wt 65.7 kg (144 lb 12.8 oz)   SpO2 98%   BMI 23.73 kg/m     General:   awake, alert, and cooperative.  NAD.   Head: Normocephalic, atraumatic.  Eyes: Conjunctiva clear, non icteric.   Neuro: Alert and oriented - normal speech.                 "

## 2022-11-17 ENCOUNTER — LAB (OUTPATIENT)
Dept: LAB | Facility: CLINIC | Age: 32
End: 2022-11-17
Payer: COMMERCIAL

## 2022-11-17 ENCOUNTER — MYC MEDICAL ADVICE (OUTPATIENT)
Dept: OBGYN | Facility: CLINIC | Age: 32
End: 2022-11-17

## 2022-11-17 DIAGNOSIS — N89.8 VAGINAL DISCHARGE: ICD-10-CM

## 2022-11-17 DIAGNOSIS — N76.0 BV (BACTERIAL VAGINOSIS): Primary | ICD-10-CM

## 2022-11-17 DIAGNOSIS — B96.89 BV (BACTERIAL VAGINOSIS): Primary | ICD-10-CM

## 2022-11-17 DIAGNOSIS — N89.8 VAGINAL DISCHARGE: Primary | ICD-10-CM

## 2022-11-17 PROCEDURE — 87210 SMEAR WET MOUNT SALINE/INK: CPT

## 2022-11-17 RX ORDER — METRONIDAZOLE 500 MG/1
500 TABLET ORAL 2 TIMES DAILY
Qty: 14 TABLET | Refills: 0 | Status: CANCELLED | OUTPATIENT
Start: 2022-11-17 | End: 2022-11-24

## 2022-11-17 RX ORDER — METRONIDAZOLE 7.5 MG/G
1 GEL VAGINAL DAILY
Qty: 25 G | Refills: 0 | Status: SHIPPED | OUTPATIENT
Start: 2022-11-17 | End: 2022-11-22

## 2022-11-17 RX ORDER — METRONIDAZOLE 500 MG/1
500 TABLET ORAL 2 TIMES DAILY
Qty: 14 TABLET | Refills: 0 | Status: SHIPPED | OUTPATIENT
Start: 2022-11-17 | End: 2022-11-24

## 2022-11-17 NOTE — TELEPHONE ENCOUNTER
Nanda Joshua, DO  [facility] Triage Just now (4:04 PM)     MM  I am covering for Dr. Perez and sent in a new script for Flagyl po (per patient request).  Please let this pt know.  Also, make sure which pharmacy she uses.      RN called and cancelled gel at other pharmacy location.    Patricia Lackey RN on 11/17/2022 at 4:10 PM

## 2022-11-17 NOTE — TELEPHONE ENCOUNTER
Pt had wet prep done today showing clue cells, pt finished boric acid 7 day treatment 2-3 days ago with some relief.    Pt had wet prep completed today (old order) and asking for below queued medication to be sent instead to preferred pharmacy as the metrogel does not work for her.    RN routing to provider to advise, RN can call to cancel previous script sent in.    Patricia Lackey RN on 11/17/2022 at 3:33 PM

## 2023-01-07 ENCOUNTER — HEALTH MAINTENANCE LETTER (OUTPATIENT)
Age: 33
End: 2023-01-07

## 2023-04-22 ENCOUNTER — HEALTH MAINTENANCE LETTER (OUTPATIENT)
Age: 33
End: 2023-04-22

## 2023-12-13 ENCOUNTER — OFFICE VISIT (OUTPATIENT)
Dept: FAMILY MEDICINE | Facility: CLINIC | Age: 33
End: 2023-12-13
Payer: COMMERCIAL

## 2023-12-13 VITALS
BODY MASS INDEX: 20.65 KG/M2 | SYSTOLIC BLOOD PRESSURE: 123 MMHG | TEMPERATURE: 97.3 F | OXYGEN SATURATION: 100 % | HEART RATE: 83 BPM | DIASTOLIC BLOOD PRESSURE: 86 MMHG | HEIGHT: 66 IN | WEIGHT: 128.5 LBS

## 2023-12-13 DIAGNOSIS — B96.89 BACTERIAL VAGINOSIS: ICD-10-CM

## 2023-12-13 DIAGNOSIS — N89.8 VAGINAL DISCHARGE: Primary | ICD-10-CM

## 2023-12-13 DIAGNOSIS — N76.0 BACTERIAL VAGINOSIS: ICD-10-CM

## 2023-12-13 PROCEDURE — 87210 SMEAR WET MOUNT SALINE/INK: CPT | Performed by: INTERNAL MEDICINE

## 2023-12-13 PROCEDURE — 99213 OFFICE O/P EST LOW 20 MIN: CPT | Performed by: INTERNAL MEDICINE

## 2023-12-13 RX ORDER — METRONIDAZOLE 500 MG/1
500 TABLET ORAL 3 TIMES DAILY
Qty: 21 TABLET | Refills: 0 | Status: SHIPPED | OUTPATIENT
Start: 2023-12-13 | End: 2023-12-20

## 2023-12-13 ASSESSMENT — PAIN SCALES - GENERAL: PAINLEVEL: NO PAIN (0)

## 2023-12-13 NOTE — COMMUNITY RESOURCES LIST (ENGLISH)
12/13/2023   [facility]  N/A  For questions about this resource list or additional care needs, please contact your primary care clinic or care manager.  Phone: [phone]   Email: N/A   Address: [address] [place] [address]   Hours: N/A        Transportation       Free or low-cost transportation  1  [employer] Distance: 10.18 miles      In-Person   [address] [place] [address]  Language: English  Hours: Mon - Fri 6:00 AM - 6:00 PM  Fees: Insurance, Self Pay   Phone: (118) 400-4526 Email: info@[employer] Website: http://www.[employer]     2  [employer]  [place] - Work-related transportation Distance: 17.1 miles      In-Person   [address] [place] [address]  Language: English  Hours: Wed 10:00 AM - 5:00 PM , Thu 10:00 AM - 6:00 PM , Fri 10:00 AM - 5:00 PM , Sat 10:00 AM - 4:00 PM  Fees: Free   Phone: (627) 119-3178 Email: lien@[employer] Website: http://www.[employer]     Transportation to medical appointments  3  [employer] Distance: 9.18 miles      In-Person   [address] [place] [address]  Language: English  Hours: Mon - Sat 4:00 AM - 6:00 PM  Fees: Insurance, Self Pay   Phone: (446) 609-9364 Email: delighttransportation1@[employer] Website: https://helpmeconnect.web.[facility].us/HelpMeConnect/Providers/Delight_Transportation/Transportation/2?returnUrl=%2FHelpMeConnect%2FSearch%2FBasicNeeds%2FTransportation%2FTransportationServices%3Fstart%3D40     4  [employer] Distance: 10.18 miles      In-Person   [address] [place] [address]  Language: English  Hours: Mon - Fri 6:00 AM - 6:00 PM  Fees: Insurance, Self Pay   Phone: (233) 436-7020 Email: info@[employer] Website: http://www.[employer].tw.[employer]          Important Numbers & Websites       Emergency Services   911  [facility]   311  Poison Control   (387)  155-6225  Suicide Prevention Lifeline   (997) 727-8638 (TALK)  Child Abuse Hotline   (419) 705-2130 (4-A-Child)  Sexual Assault Hotline   (186) 997-3067 (HOPE)  National Runaway Safeline   (342) 171-8713 (RUNAWAY)  All-Options Talkline   (407) 555-4847  Substance Abuse Referral   (503) 209-6598 (HELP)

## 2023-12-13 NOTE — PROGRESS NOTES
Assessment & Plan     1.  Vaginal discharge x 1 week.  Wet prep will be performed.  I will get back to the results and recommendation for treatment.  In the past she has been treated quite frequently for bacterial vaginosis.    Addendum:   Wet prep came back showing evidence of clue cells consistent bacterial vaginosis.  Metronidazole 500 mg 3 times a day for 7 days prescribed.    Marshall Sheehan MD  [facility] TEE Tadeo is a 33 year old, presenting for the following health issues:  No chief complaint on file.        12/13/2023     2:30 PM   Additional Questions   Roomed by Teresa   Accompanied by self       History of Present Illness       Reason for visit:  Ph imbalance    She eats 2-3 servings of fruits and vegetables daily.She consumes 3 sweetened beverage(s) daily.She exercises with enough effort to increase her heart rate 30 to 60 minutes per day.  She exercises with enough effort to increase her heart rate 5 days per week.   She is taking medications regularly.         Vaginal Symptoms  Onset/Duration: possible yeast infection  Description:  Vaginal Discharge: white creamy   Itching (Pruritis): YES  Burning sensation:  No  Odor: No  Accompanying Signs & Symptoms:  Urinary symptoms: No  Abdominal pain: No  Fever: No  History:   Sexually active: YES- but have not been sexually active for 2 weeks  New Partner: No  Possibility of Pregnancy:  No  Recent antibiotic use: No  Previous vaginitis issues: YES  Precipitating or alleviating factors: None  Therapies tried and outcome: none     Patient presents with 1 week history of vaginal discharge with increased vaginal sensitivity.    Review of Systems   Constitutional, HEENT, cardiovascular, pulmonary, gi and gu systems are negative, except as otherwise noted.      Objective    There were no vitals taken for this visit.  There is no height or weight on file to calculate BMI.  Physical Exam   GENERAL: healthy, alert and no  distress  ABDOMEN: soft, nontender, no hepatosplenomegaly, no masses and bowel sounds normal

## 2023-12-13 NOTE — COMMUNITY RESOURCES LIST (ENGLISH)
12/13/2023   [employer]  N/A  For questions about this resource list or additional care needs, please contact your primary care clinic or care manager.  Phone: [phone]   Email: N/A   Address: [address] [place] [address]   Hours: N/A        Transportation       Free or low-cost transportation  1  [employer] Distance: 10.18 miles      In-Person   [address] [place] [address]  Language: English  Hours: Mon - Fri 6:00 AM - 6:00 PM  Fees: Insurance, Self Pay   Phone: (395) 111-4423 Email: info@[employer] Website: http://www.[employer]     2  [employer]  [place] - Work-related transportation Distance: 17.1 miles      In-Person   [address] [place] [address]  Language: English  Hours: Wed 10:00 AM - 5:00 PM , Thu 10:00 AM - 6:00 PM , Fri 10:00 AM - 5:00 PM , Sat 10:00 AM - 4:00 PM  Fees: Free   Phone: (386) 399-7794 Email: lien@[employer] Website: http://www.[employer]     Transportation to medical appointments  3  [place] Transportation Distance: 9.18 miles      In-Person   [address] [place] [address]  Language: English  Hours: Mon - Sat 4:00 AM - 6:00 PM  Fees: Insurance, Self Pay   Phone: (758) 621-7718 Email: delighttransportation1@[employer] Website: https://helpmeconnect.web.[facility].us/HelpMeConnect/Providers/Delight_Transportation/Transportation/2?returnUrl=%2FHelpMeConnect%2FSearch%2FBasicNeeds%2FTransportation%2FTransportationServices%3Fstart%3D40     4  [employer] Transportation Distance: 10.18 miles      In-Person   [address] [place] [address]  Language: English  Hours: Mon - Fri 6:00 AM - 6:00 PM  Fees: Insurance, Self Pay   Phone: (667) 616-4326 Email: info@[employer] Website: http://www.[employer]tonTracks.[employer]          Important Numbers & Websites       Emergency Services   911  [facility] Services   311  Poison Control   (420)  233-9222  Suicide Prevention Lifeline   (681) 819-3089 (TALK)  Child Abuse Hotline   (386) 807-7529 (4-A-Child)  Sexual Assault Hotline   (234) 546-7905 (HOPE)  National Runaway Safeline   (722) 298-6800 (RUNAWAY)  All-Options Talkline   (951) 759-3664  Substance Abuse Referral   (928) 179-8714 (HELP)

## 2024-05-02 ENCOUNTER — TELEPHONE (OUTPATIENT)
Dept: FAMILY MEDICINE | Facility: CLINIC | Age: 34
End: 2024-05-02
Payer: COMMERCIAL

## 2024-05-02 NOTE — TELEPHONE ENCOUNTER
"Reviewed chart, last seen for OV 12/13/23 by Dr. Sheehan for vaginal discharge and prescribed Flagyl for bacterial vaginosis. Per message below, thinks she has yeast infection again. Since visit was >1 month ago, does need repeat visit for assessment and possible treatment.     Called patient and relayed information above, patient did confirm that her symptoms did resolve after treatment but just recently returned so she does need follow up visit - patient verbalized understanding. Upset re: this as she states she has never been told this in the past - did review options such as e-Visit, virtual visits or in person at either primary care office or urgent care. Patient aware, states that she may \"try to go somewhere else\" to get lab work done - declines scheduling any appointment at this time. Patient then hung up phone on writer.      Sara Siegel, RN, BSN  [facility]  "

## 2024-05-02 NOTE — TELEPHONE ENCOUNTER
[facility] Call Center    Phone Message    May a detailed message be left on voicemail: yes     Reason for Call: Order(s): Other:     Reason for requested: PER PATIENT : I THINK I HAVE A YEAST INFECTION SIMILAR TO WHEN I HAD IT BACK IN DECEMBER OF 2023 AND SAW DR. SILVA IN [facility]. I WANT TO GET LAB ORDERS FOR URINE CHECK IF POSSIBLE TO CHECK IF I HAVE UTI OR YEAST. PLEASE LET ME KNOW IF I CAN GET ORDERS FOR LAB. OK FOR PHONE CALL AND OK TO [facility]. ( Pt denies evisit )     Date needed: ASAP    Provider name: CALE SILVA MD OR MINH MAURER CNP    Action Taken: Other: [facility] POOL AND [facility] POOL    Travel Screening: Not Applicable

## 2024-05-06 ENCOUNTER — E-VISIT (OUTPATIENT)
Dept: URGENT CARE | Facility: CLINIC | Age: 34
End: 2024-05-06
Payer: COMMERCIAL

## 2024-05-06 ENCOUNTER — LAB (OUTPATIENT)
Dept: LAB | Facility: CLINIC | Age: 34
End: 2024-05-06
Payer: COMMERCIAL

## 2024-05-06 DIAGNOSIS — N76.0 ACUTE VAGINITIS: ICD-10-CM

## 2024-05-06 DIAGNOSIS — B96.89 BV (BACTERIAL VAGINOSIS): Primary | ICD-10-CM

## 2024-05-06 DIAGNOSIS — N76.0 BV (BACTERIAL VAGINOSIS): Primary | ICD-10-CM

## 2024-05-06 DIAGNOSIS — N76.0 ACUTE VAGINITIS: Primary | ICD-10-CM

## 2024-05-06 PROCEDURE — 87591 N.GONORRHOEAE DNA AMP PROB: CPT

## 2024-05-06 PROCEDURE — 87491 CHLMYD TRACH DNA AMP PROBE: CPT

## 2024-05-06 PROCEDURE — 99207 PR NON-BILLABLE SERV PER CHARTING: CPT | Performed by: PREVENTIVE MEDICINE

## 2024-05-06 PROCEDURE — 87210 SMEAR WET MOUNT SALINE/INK: CPT

## 2024-05-06 RX ORDER — METRONIDAZOLE 500 MG/1
500 TABLET ORAL 2 TIMES DAILY
Qty: 14 TABLET | Refills: 0 | Status: SHIPPED | OUTPATIENT
Start: 2024-05-06 | End: 2024-05-13

## 2024-05-06 NOTE — PATIENT INSTRUCTIONS
Thank you for choosing us for your care. Given your symptoms, I would like you to do a lab-only visit to determine what is causing them.  I have placed the orders.  Please schedule an appointment with the lab right here in [facility], or call [phone].  I will let you know when the results are back and next steps to take.  Vaginitis: Care Instructions  Overview     Vaginitis is soreness or infection of the vagina. This common problem can cause itching and burning. And it can cause a change in vaginal discharge. Sometimes it can cause pain during sex. Vaginitis may be caused by bacteria, yeast, or other germs. Some infections that cause it are caught from a sexual partner. Bath products, spermicides, and douches can irritate the vagina too.  This problem can also happen during and after menopause. A drop in estrogen levels during this time can cause dryness, soreness, and pain during sex.  Your doctor can give you medicine to treat vaginitis. And home care may help you feel better. For certain types of infections, your sex partner(s) must be treated too.  Follow-up care is a key part of your treatment and safety. Be sure to make and go to all appointments, and call your doctor if you are having problems. It's also a good idea to know your test results and keep a list of the medicines you take.  How can you care for yourself at home?  Take your medicines exactly as prescribed. Call your doctor if you think you are having a problem with your medicine.  Ask your doctor if your sex partner(s) also needs treatment.  Do not eat or drink anything that has alcohol if you are taking metronidazole (Flagyl).  Wash your vulva daily with water. You also can use a mild, unscented soap if you want.  Do not use scented bath products. And do not use vaginal sprays or douches.  Change out of wet or damp clothes as soon as possible. Wear cotton underwear. And avoid tight clothing that could increase moisture.  Put a washcloth soaked  "in cool water on the area to relieve itching. Or you can take cool baths.  If you have dryness because of menopause, use estrogen cream or pills that your doctor prescribes.  Ask your doctor about when it is okay to have sex.  Use a personal lubricant before sex if you have dryness. Examples are Astroglide, K-Y Jelly, and Wet Lubricant Gel.  When should you call for help?   Call your doctor now or seek immediate medical care if:    You have a fever.     You have new or increased pain in your vagina or pelvis.     You have new or worse vaginal itching or discharge.   Watch closely for changes in your health, and be sure to contact your doctor if:    You have bleeding other than your period.     You do not get better as expected.   Where can you learn more?  Go to https://www.[employer].net/patiented  Enter F219 in the search box to learn more about \"Vaginitis: Care Instructions.\"  Current as of: November 27, 2023               Content Version: 14.0    3573-0480 [employer].   Care instructions adapted under license by your healthcare professional. If you have questions about a medical condition or this instruction, always ask your healthcare professional. [employer] disclaims any warranty or liability for your use of this information.      "

## 2024-05-07 LAB
C TRACH DNA SPEC QL NAA+PROBE: NEGATIVE
N GONORRHOEA DNA SPEC QL NAA+PROBE: NEGATIVE

## 2024-06-29 ENCOUNTER — HEALTH MAINTENANCE LETTER (OUTPATIENT)
Age: 34
End: 2024-06-29

## 2024-07-06 ENCOUNTER — E-VISIT (OUTPATIENT)
Dept: URGENT CARE | Facility: CLINIC | Age: 34
End: 2024-07-06
Payer: COMMERCIAL

## 2024-07-06 DIAGNOSIS — N76.0 BV (BACTERIAL VAGINOSIS): ICD-10-CM

## 2024-07-06 DIAGNOSIS — N89.8 VAGINAL DISCHARGE: Primary | ICD-10-CM

## 2024-07-06 DIAGNOSIS — B96.89 BV (BACTERIAL VAGINOSIS): ICD-10-CM

## 2024-07-06 PROCEDURE — 99207 PR NON-BILLABLE SERV PER CHARTING: CPT | Performed by: PHYSICIAN ASSISTANT

## 2024-07-06 NOTE — PATIENT INSTRUCTIONS
Thank you for choosing us for your care. Given your symptoms, I would like you to do a lab-only visit to determine what is causing them.  I have placed the orders.  Please schedule an appointment with the lab right here in [place], or call [phone].  I will let you know when the results are back and next steps to take.    Schedule a Lab Only appointment here.

## 2024-07-08 ENCOUNTER — LAB (OUTPATIENT)
Dept: LAB | Facility: CLINIC | Age: 34
End: 2024-07-08
Payer: COMMERCIAL

## 2024-07-08 DIAGNOSIS — N89.8 VAGINAL DISCHARGE: ICD-10-CM

## 2024-07-08 PROCEDURE — 87210 SMEAR WET MOUNT SALINE/INK: CPT

## 2024-07-08 RX ORDER — METRONIDAZOLE 500 MG/1
500 TABLET ORAL 2 TIMES DAILY
Qty: 14 TABLET | Refills: 0 | Status: SHIPPED | OUTPATIENT
Start: 2024-07-08 | End: 2024-07-15

## 2024-08-18 ENCOUNTER — E-VISIT (OUTPATIENT)
Dept: URGENT CARE | Facility: CLINIC | Age: 34
End: 2024-08-18
Payer: COMMERCIAL

## 2024-08-18 DIAGNOSIS — N89.8 VAGINAL DISCHARGE: Primary | ICD-10-CM

## 2024-08-18 PROCEDURE — 99207 PR NON-BILLABLE SERV PER CHARTING: CPT | Performed by: FAMILY MEDICINE

## 2024-08-19 RX ORDER — METRONIDAZOLE 500 MG/1
500 TABLET ORAL 2 TIMES DAILY
Qty: 14 TABLET | Refills: 0 | Status: SHIPPED | OUTPATIENT
Start: 2024-08-19 | End: 2024-08-26

## 2024-08-19 NOTE — PATIENT INSTRUCTIONS
Thank you for choosing us for your care. I have placed an order for a prescription so that you can start treatment for suspected bacterial vaginosis.  View your full visit summary for details by clicking on the link below. Your pharmacist will able to address any questions you may have about the medication.     If you re not feeling better within 2-3 days, please schedule an appointment.  You can schedule an appointment right here in [facility], or call [phone]  If the visit is for the same symptoms as your eVisit, we ll refund the cost of your eVisit if seen within seven days.

## 2024-08-20 NOTE — TELEPHONE ENCOUNTER
Writer replied to patient via [employer].  GERRI LanN, RN (she/her)  [facility] RN

## 2024-09-04 ENCOUNTER — OFFICE VISIT (OUTPATIENT)
Dept: FAMILY MEDICINE | Facility: CLINIC | Age: 34
End: 2024-09-04
Payer: COMMERCIAL

## 2024-09-04 ENCOUNTER — TELEPHONE (OUTPATIENT)
Dept: FAMILY MEDICINE | Facility: CLINIC | Age: 34
End: 2024-09-04

## 2024-09-04 VITALS
OXYGEN SATURATION: 99 % | TEMPERATURE: 98.1 F | RESPIRATION RATE: 16 BRPM | WEIGHT: 131.4 LBS | HEART RATE: 68 BPM | HEIGHT: 66 IN | BODY MASS INDEX: 21.12 KG/M2 | SYSTOLIC BLOOD PRESSURE: 104 MMHG | DIASTOLIC BLOOD PRESSURE: 68 MMHG

## 2024-09-04 DIAGNOSIS — B96.89 BACTERIAL VAGINOSIS: Primary | ICD-10-CM

## 2024-09-04 DIAGNOSIS — N76.0 BACTERIAL VAGINOSIS: Primary | ICD-10-CM

## 2024-09-04 PROCEDURE — 87591 N.GONORRHOEAE DNA AMP PROB: CPT | Performed by: PHYSICIAN ASSISTANT

## 2024-09-04 PROCEDURE — 87491 CHLMYD TRACH DNA AMP PROBE: CPT | Performed by: PHYSICIAN ASSISTANT

## 2024-09-04 PROCEDURE — 99214 OFFICE O/P EST MOD 30 MIN: CPT | Performed by: PHYSICIAN ASSISTANT

## 2024-09-04 PROCEDURE — 87210 SMEAR WET MOUNT SALINE/INK: CPT | Performed by: PHYSICIAN ASSISTANT

## 2024-09-04 RX ORDER — METRONIDAZOLE 500 MG/1
500 TABLET ORAL 2 TIMES DAILY
Qty: 14 TABLET | Refills: 0 | Status: SHIPPED | OUTPATIENT
Start: 2024-09-04 | End: 2024-09-11

## 2024-09-04 RX ORDER — METRONIDAZOLE 7.5 MG/G
1 GEL VAGINAL
Qty: 70 G | Refills: 3 | Status: SHIPPED | OUTPATIENT
Start: 2024-09-05

## 2024-09-04 NOTE — TELEPHONE ENCOUNTER
RN Triage    Patient Contact    Attempt # 1    Was call answered?  No.  Left message on voicemail with information to call me back.    Upon call back please relay below message  Jason Tadeo,  Your recent test results show:     Your vaginal swab is showing bacterial vaginosis again. Plan to take the metronidazole oral pills twice daily for 7 days. Then start using the metronidazole vaginal cream twice weekly (ie Monday and Thursday) every week for 3-6months. If you continue to have bacterial vaginosis symptoms with this treatment, then please see OB/GYN.     Take care,  TERESA Bill RN  [facility] - Registered Nurse  Clinic Triage [place]   September 4, 2024

## 2024-09-04 NOTE — TELEPHONE ENCOUNTER
Patient returned call.  I read provider note/instructions to patient as per below.  She states understanding on how to take these medications.  Aware both meds sent to [facility] in [place].  No further questions.  Dottie HUERTA RN

## 2024-09-05 LAB
C TRACH DNA SPEC QL PROBE+SIG AMP: NEGATIVE
N GONORRHOEA DNA SPEC QL NAA+PROBE: NEGATIVE

## 2024-12-10 ENCOUNTER — OFFICE VISIT (OUTPATIENT)
Dept: FAMILY MEDICINE | Facility: CLINIC | Age: 34
End: 2024-12-10
Payer: COMMERCIAL

## 2024-12-10 VITALS
SYSTOLIC BLOOD PRESSURE: 130 MMHG | DIASTOLIC BLOOD PRESSURE: 85 MMHG | BODY MASS INDEX: 21.34 KG/M2 | TEMPERATURE: 98.1 F | WEIGHT: 132.8 LBS | HEIGHT: 66 IN | OXYGEN SATURATION: 100 % | HEART RATE: 88 BPM | RESPIRATION RATE: 16 BRPM

## 2024-12-10 DIAGNOSIS — R09.81 NASAL SINUS CONGESTION: ICD-10-CM

## 2024-12-10 DIAGNOSIS — H65.192 ACUTE EFFUSION OF LEFT EAR: ICD-10-CM

## 2024-12-10 DIAGNOSIS — S02.2XXS CLOSED FRACTURE OF NASAL BONE, SEQUELA: ICD-10-CM

## 2024-12-10 DIAGNOSIS — B96.89 ACUTE BACTERIAL PHARYNGITIS: Primary | ICD-10-CM

## 2024-12-10 DIAGNOSIS — J02.8 ACUTE BACTERIAL PHARYNGITIS: Primary | ICD-10-CM

## 2024-12-10 PROCEDURE — 99214 OFFICE O/P EST MOD 30 MIN: CPT | Performed by: NURSE PRACTITIONER

## 2024-12-10 RX ORDER — FLUTICASONE PROPIONATE 50 MCG
1-2 SPRAY, SUSPENSION (ML) NASAL DAILY
Qty: 15.8 ML | Refills: 1 | Status: SHIPPED | OUTPATIENT
Start: 2024-12-10

## 2024-12-10 RX ORDER — AMOXICILLIN 500 MG/1
1000 CAPSULE ORAL DAILY
Qty: 20 CAPSULE | Refills: 0 | Status: SHIPPED | OUTPATIENT
Start: 2024-12-10

## 2024-12-10 NOTE — PROGRESS NOTES
Assessment & Plan     Acute bacterial pharyngitis  Throat irritation and exudate, denies fever or chills.   - amoxicillin (AMOXIL) 500 MG capsule; Take 2 capsules (1,000 mg) by mouth daily.    History of nasal bone fracture  Nasal sinus congestion  Nasal bone trauma a few years ago for which she did not seek medical attention.  Has been having issues with congestion/stuffiness.   - Adult ENT  Referral; Future    Acute effusion of left ear  - fluticasone (FLONASE) 50 MCG/ACT nasal spray; Spray 1-2 sprays into both nostrils daily.        Misha Tadeo is a 34 year old, presenting for the following health issues:  Facial Injury    Facial Injury    History of Present Illness       Reason for visit:  Broke my nose years ago and its caused complications   She is taking medications regularly.         Had noes energy over 10 years ago she never went to see the dr about it know she is noticing there are problems with her nose, she feels she is not able to completely blow he nose she feels things are abnormal. She says this is consistent and ongoing issues         Review of Systems  Constitutional, neuro, ENT, endocrine, pulmonary, cardiac, gastrointestinal, genitourinary, musculoskeletal, integument and psychiatric systems are negative, except as otherwise noted.      Objective    There were no vitals taken for this visit.  There is no height or weight on file to calculate BMI.  Physical Exam   GENERAL: alert and no distress  HENT: normal cephalic/atraumatic, right ear: normal: no effusions, no erythema, normal landmarks, left ear: clear effusion, nose and mouth without ulcers or lesions, oral mucous membranes moist, tonsillar erythema, and tonsillar exudate  NECK: no adenopathy, no asymmetry, masses, or scars  RESP: lungs clear to auscultation - no rales, rhonchi or wheezes  CV: regular rate and rhythm, normal S1 S2, no S3 or S4, no murmur, click or rub, no peripheral edema  MS: no gross musculoskeletal  defects noted, no edema          Signed Electronically by: MINH Finch CNP

## 2024-12-10 NOTE — PATIENT INSTRUCTIONS
At [facility], we strive to deliver an exceptional experience to you, every time we see you. If you receive a survey, please let us know what we are doing well and/or what we could improve upon, as we do value your feedback.  If you have MyChart, you can expect to receive results automatically within 24 hours of their completion.  Your provider will send a note interpreting your results as well.   If you do not have MyChart, you should receive your results in about a week by mail.    Your care team:                            Family Medicine Internal Medicine   MD Yo Armenta, MD Kenya Browne, MD Dion Miller, MD Megan Amaya, PAJavanC    Deshaun Wiseman, MD Pediatrics   Bia Gallardo, MD Hayde Estrada, MD Dottie Benites, APRN CNP Nichole James APRN CNP   MD Emma Jones, MD Amber Cueva, CNP     Jovan Marino, CNP Same-Day Provider (No follow-up visits)   MINH Finch, DNP Symone Glover, MINH Adan, FNP, BC GUILLERMO ChongC     Clinic hours: Monday - Thursday 7 am-6 pm; Fridays 7 am-5 pm.   Urgent care: Monday - Friday 10 am- 8 pm; Saturday and Sunday 9 am-5 pm.    Clinic: [phone]       [facility]: Monday - Thursday 8 am - 7 pm; Friday 8 am - 6 pm  [facility]: [phone]